# Patient Record
Sex: MALE | NOT HISPANIC OR LATINO | Employment: FULL TIME | URBAN - METROPOLITAN AREA
[De-identification: names, ages, dates, MRNs, and addresses within clinical notes are randomized per-mention and may not be internally consistent; named-entity substitution may affect disease eponyms.]

---

## 2020-11-17 ENCOUNTER — RESULTS ENCOUNTER (OUTPATIENT)
Dept: INTERNAL MEDICINE | Facility: CLINIC | Age: 60
End: 2020-11-17

## 2020-11-17 ENCOUNTER — LAB (OUTPATIENT)
Dept: LAB | Facility: HOSPITAL | Age: 60
End: 2020-11-17

## 2020-11-17 ENCOUNTER — OFFICE VISIT (OUTPATIENT)
Dept: INTERNAL MEDICINE | Facility: CLINIC | Age: 60
End: 2020-11-17

## 2020-11-17 VITALS
RESPIRATION RATE: 16 BRPM | WEIGHT: 223.4 LBS | SYSTOLIC BLOOD PRESSURE: 110 MMHG | TEMPERATURE: 97.3 F | DIASTOLIC BLOOD PRESSURE: 70 MMHG | HEART RATE: 81 BPM | BODY MASS INDEX: 31.27 KG/M2 | OXYGEN SATURATION: 97 % | HEIGHT: 71 IN

## 2020-11-17 DIAGNOSIS — Z20.822 CLOSE EXPOSURE TO COVID-19 VIRUS: ICD-10-CM

## 2020-11-17 DIAGNOSIS — Z12.11 SCREENING FOR COLON CANCER: ICD-10-CM

## 2020-11-17 DIAGNOSIS — F33.1 MODERATE EPISODE OF RECURRENT MAJOR DEPRESSIVE DISORDER (HCC): ICD-10-CM

## 2020-11-17 DIAGNOSIS — Z23 NEED FOR INFLUENZA VACCINATION: ICD-10-CM

## 2020-11-17 DIAGNOSIS — M77.8 RIGHT ELBOW TENDINITIS: ICD-10-CM

## 2020-11-17 DIAGNOSIS — F41.9 ANXIETY: Primary | ICD-10-CM

## 2020-11-17 PROCEDURE — 90471 IMMUNIZATION ADMIN: CPT | Performed by: NURSE PRACTITIONER

## 2020-11-17 PROCEDURE — 90686 IIV4 VACC NO PRSV 0.5 ML IM: CPT | Performed by: NURSE PRACTITIONER

## 2020-11-17 PROCEDURE — 86769 SARS-COV-2 COVID-19 ANTIBODY: CPT | Performed by: NURSE PRACTITIONER

## 2020-11-17 PROCEDURE — 99203 OFFICE O/P NEW LOW 30 MIN: CPT | Performed by: NURSE PRACTITIONER

## 2020-11-17 PROCEDURE — 36415 COLL VENOUS BLD VENIPUNCTURE: CPT

## 2020-11-17 RX ORDER — DEXTROAMPHETAMINE SACCHARATE, AMPHETAMINE ASPARTATE, DEXTROAMPHETAMINE SULFATE AND AMPHETAMINE SULFATE 5; 5; 5; 5 MG/1; MG/1; MG/1; MG/1
20 TABLET ORAL 3 TIMES DAILY
COMMUNITY
End: 2022-01-14 | Stop reason: ALTCHOICE

## 2020-11-17 RX ORDER — DULOXETIN HYDROCHLORIDE 60 MG/1
60 CAPSULE, DELAYED RELEASE ORAL DAILY
COMMUNITY
End: 2020-11-17 | Stop reason: SDUPTHER

## 2020-11-17 RX ORDER — IBUPROFEN 800 MG/1
800 TABLET ORAL EVERY 8 HOURS PRN
Qty: 90 TABLET | Refills: 2 | Status: SHIPPED | OUTPATIENT
Start: 2020-11-17 | End: 2021-03-08

## 2020-11-17 RX ORDER — BUSPIRONE HYDROCHLORIDE 15 MG/1
15 TABLET ORAL 3 TIMES DAILY
Qty: 180 TABLET | Refills: 1 | Status: SHIPPED | OUTPATIENT
Start: 2020-11-17 | End: 2020-12-21 | Stop reason: SDUPTHER

## 2020-11-17 RX ORDER — DULOXETIN HYDROCHLORIDE 60 MG/1
60 CAPSULE, DELAYED RELEASE ORAL DAILY
Qty: 90 CAPSULE | Refills: 1 | Status: SHIPPED | OUTPATIENT
Start: 2020-11-17 | End: 2022-01-14 | Stop reason: SDUPTHER

## 2020-11-17 RX ORDER — BUSPIRONE HYDROCHLORIDE 10 MG/1
10 TABLET ORAL 2 TIMES DAILY
COMMUNITY
End: 2020-11-17

## 2020-11-17 NOTE — PROGRESS NOTES
Subjective   Kennedy Griffith is a 60 y.o. male    Chief Complaint   Patient presents with   • Establish Care   • Right elbow pain   • Depression, Fatigue/Sleepiness     Discuss increase in dose of the Buspar.    • Med Refill     History of Present Illness     New pt here to establish care.  Pt recently moved to [place].      Depression/anxiety - pt lost is daughter to PTSD/Suicide 7-8 yrs ago.  He himself has battled depression and anxiety since.  He is currently on Cymbalta 60 mg daily and Buspar 10 mg BID.  He has been taking his wife's 15 mg BID and feels better on this dose.  He would like to increase to the 15 mg dose.  Has also been given Ativan in the past for PRN use.    He is also prescribed adderall 20 mg TID.  States that he has been on this for 20+ years.  Was placed on this due to chronic/excessive daytime fatigue.  Was originally prescribed Provigil and later changed to this due to insurance purposes    Right elbow pain - pt states that prior to moving to [place], he was removing snow spikes from his tires.  This was a repetitive motion that took him several hours.  Since then, he has had pain in his right elbow that radiates to the right hand/wrist.  Pain is worse with gripping/turning and lifting.  Has full ROM.  Has been wearing a brace with mild relief of sx's.  Has also tried OTC tylenol and aleve w/o relief of sx's.      Patient states that around the end of August, 1 September he was ill.  States that he had flulike symptoms.  He was sent home from work and quarantine for 14 days.  He did have a Covid test which was negative at the time.  States that he had all of the symptoms other than loss of sense of taste or smell.  He would like to have the antibody test done today.    Past Medical History:   Diagnosis Date   • Depression    • Fatigue     chronic   • History of kidney stones    • Sleepiness    • Tension headache      History reviewed. No pertinent surgical history.    Family History   Problem Relation  Age of Onset   • Arthritis Father    • Other Mother         cardiac arrhythmia   • No Known Problems Sister    • No Known Problems Brother    • Other Daughter         PTSD/suicide   • No Known Problems Son    • ADD / ADHD Son      Social History     Socioeconomic History   • Marital status:      Spouse name: Not on file   • Number of children: Not on file   • Years of education: Not on file   • Highest education level: Not on file   Tobacco Use   • Smoking status: Never Smoker   • Smokeless tobacco: Never Used   Substance and Sexual Activity   • Alcohol use: Yes     Comment: on rare occasion   • Drug use: Never     No Known Allergies      The following portions of the patient's history were reviewed and updated as appropriate: allergies, current medications, past family history, past medical history, past social history, past surgical history and problem list.    Current Outpatient Medications:   •  amphetamine-dextroamphetamine (ADDERALL) 20 MG tablet, Take 20 mg by mouth 3 (Three) Times a Day., Disp: , Rfl:   •  DULoxetine (CYMBALTA) 60 MG capsule, Take 1 capsule by mouth Daily., Disp: 90 capsule, Rfl: 1  •  busPIRone (BUSPAR) 15 MG tablet, Take 1 tablet by mouth 3 (Three) Times a Day., Disp: 180 tablet, Rfl: 1  •  ibuprofen (ADVIL,MOTRIN) 800 MG tablet, Take 1 tablet by mouth Every 8 (Eight) Hours As Needed for Mild Pain ., Disp: 90 tablet, Rfl: 2     Review of Systems   Constitutional: Positive for fatigue. Negative for chills and fever.   Respiratory: Negative for cough, chest tightness and shortness of breath.    Cardiovascular: Negative for chest pain.   Gastrointestinal: Negative for abdominal pain, diarrhea, nausea and vomiting.   Endocrine: Negative for cold intolerance and heat intolerance.   Musculoskeletal: Positive for arthralgias.        Right elbow pain   Neurological: Negative for dizziness.   Psychiatric/Behavioral: The patient is nervous/anxious.        Objective   Physical  "Exam  Constitutional:       Appearance: He is well-developed.   HENT:      Head: Normocephalic and atraumatic.   Eyes:      Conjunctiva/sclera: Conjunctivae normal.      Pupils: Pupils are equal, round, and reactive to light.   Neck:      Musculoskeletal: Normal range of motion.   Cardiovascular:      Rate and Rhythm: Normal rate and regular rhythm.      Heart sounds: Normal heart sounds.   Pulmonary:      Effort: Pulmonary effort is normal.      Breath sounds: Normal breath sounds.   Abdominal:      General: Bowel sounds are normal.      Palpations: Abdomen is soft.   Musculoskeletal: Normal range of motion.      Right elbow: He exhibits normal range of motion, no swelling, no effusion, no deformity and no laceration. Tenderness found. Medial epicondyle and lateral epicondyle tenderness noted. No radial head and no olecranon process tenderness noted.      Comments:  strength on the right 4/5, left 5/5; noted pain with gripping and rotation   Skin:     General: Skin is warm and dry.   Neurological:      Mental Status: He is alert and oriented to person, place, and time.      Deep Tendon Reflexes: Reflexes are normal and symmetric.   Psychiatric:         Behavior: Behavior normal.         Thought Content: Thought content normal.         Judgment: Judgment normal.       Vitals:    11/17/20 1036   BP: 110/70   Pulse: 81   Resp: 16   Temp: 97.3 °F (36.3 °C)   TempSrc: Infrared   SpO2: 97%   Weight: 101 kg (223 lb 6.4 oz)   Height: 179.1 cm (70.5\")         Assessment/Plan   Diagnoses and all orders for this visit:    1. Anxiety (Primary)  -     DULoxetine (CYMBALTA) 60 MG capsule; Take 1 capsule by mouth Daily.  Dispense: 90 capsule; Refill: 1  -     busPIRone (BUSPAR) 15 MG tablet; Take 1 tablet by mouth 3 (Three) Times a Day.  Dispense: 180 tablet; Refill: 1    2. Moderate episode of recurrent major depressive disorder (CMS/HCC)  -     DULoxetine (CYMBALTA) 60 MG capsule; Take 1 capsule by mouth Daily.  Dispense: " 90 capsule; Refill: 1  -     busPIRone (BUSPAR) 15 MG tablet; Take 1 tablet by mouth 3 (Three) Times a Day.  Dispense: 180 tablet; Refill: 1    3. Screening for colon cancer  -     Cologuard - Stool, Per Rectum; Future    4. Need for influenza vaccination  -     FluLaval Quad >6 Months ([id number])    5. Close exposure to COVID-19 virus  -     SARS-CoV-2 Antibodies (Roche); Future    6. Right elbow tendinitis  -     ibuprofen (ADVIL,MOTRIN) 800 MG tablet; Take 1 tablet by mouth Every 8 (Eight) Hours As Needed for Mild Pain .  Dispense: 90 tablet; Refill: 2      Cymbalta refilled  Buspar increased to 15 mg BID  Ibuprofen 800 mg 1 p.o. 3 times daily as needed for right elbow tendinitis, patient to let me know if symptoms or not improving and we will try physical therapy, I have recommended a elbow Aircast.  Lynsey ordered  Flu shot updated  We will check Covid antibodies  Return in about 6 weeks (around 12/29/2020) for Annual.

## 2020-11-18 LAB — SARS-COV-2 AB SERPL QL IA: NEGATIVE

## 2020-12-21 DIAGNOSIS — F41.9 ANXIETY: ICD-10-CM

## 2020-12-21 DIAGNOSIS — F33.1 MODERATE EPISODE OF RECURRENT MAJOR DEPRESSIVE DISORDER (HCC): ICD-10-CM

## 2020-12-21 RX ORDER — BUSPIRONE HYDROCHLORIDE 15 MG/1
15 TABLET ORAL 3 TIMES DAILY
Qty: 90 TABLET | Refills: 0 | Status: SHIPPED | OUTPATIENT
Start: 2020-12-21 | End: 2021-01-19 | Stop reason: SDUPTHER

## 2021-01-15 ENCOUNTER — OFFICE VISIT (OUTPATIENT)
Dept: INTERNAL MEDICINE | Facility: CLINIC | Age: 61
End: 2021-01-15

## 2021-01-15 VITALS
SYSTOLIC BLOOD PRESSURE: 112 MMHG | TEMPERATURE: 97.2 F | OXYGEN SATURATION: 97 % | RESPIRATION RATE: 16 BRPM | HEIGHT: 71 IN | DIASTOLIC BLOOD PRESSURE: 68 MMHG | HEART RATE: 84 BPM | BODY MASS INDEX: 31.61 KG/M2 | WEIGHT: 225.8 LBS

## 2021-01-15 DIAGNOSIS — Z12.5 SCREENING FOR PROSTATE CANCER: ICD-10-CM

## 2021-01-15 DIAGNOSIS — F43.10 PTSD (POST-TRAUMATIC STRESS DISORDER): ICD-10-CM

## 2021-01-15 DIAGNOSIS — Z00.00 ROUTINE GENERAL MEDICAL EXAMINATION AT A HEALTH CARE FACILITY: Primary | ICD-10-CM

## 2021-01-15 DIAGNOSIS — R51.9 FREQUENT HEADACHES: ICD-10-CM

## 2021-01-15 DIAGNOSIS — F41.9 ANXIETY: ICD-10-CM

## 2021-01-15 DIAGNOSIS — Z11.59 ENCOUNTER FOR HEPATITIS C SCREENING TEST FOR LOW RISK PATIENT: ICD-10-CM

## 2021-01-15 PROBLEM — G43.909 MIGRAINE WITHOUT STATUS MIGRAINOSUS, NOT INTRACTABLE: Status: RESOLVED | Noted: 2021-01-15 | Resolved: 2021-01-15

## 2021-01-15 PROBLEM — G43.909 MIGRAINE WITHOUT STATUS MIGRAINOSUS, NOT INTRACTABLE: Status: ACTIVE | Noted: 2021-01-15

## 2021-01-15 PROCEDURE — 99396 PREV VISIT EST AGE 40-64: CPT | Performed by: NURSE PRACTITIONER

## 2021-01-15 RX ORDER — HYDROXYZINE PAMOATE 25 MG/1
CAPSULE ORAL
COMMUNITY
Start: 2020-12-16 | End: 2022-03-25

## 2021-01-15 RX ORDER — TOPIRAMATE 25 MG/1
25 TABLET ORAL 2 TIMES DAILY
Qty: 60 TABLET | Refills: 2 | Status: SHIPPED | OUTPATIENT
Start: 2021-01-15 | End: 2021-04-12

## 2021-01-15 NOTE — PROGRESS NOTES
Subjective   Kennedy Griffith is a 60 y.o. male    Chief Complaint   Patient presents with   • Annual Exam     History of Present Illness     Depression/anxiety - pt lost is daughter to PTSD/Suicide 7-8 yrs ago.  He himself has battled depression and anxiety since.  He was on Cymbalta 60 mg daily and Buspar 10 mg BID at his initial visit and I increased his Buspar to 15 mg TID based on c/o increased anxiety.   Has also been given Ativan in the past for PRN use.  He is now seeing Psych APRN at [facility].       He is also prescribed adderall 20 mg TID.  States that he has been on this for 20+ years.  Was placed on this due to chronic/excessive daytime fatigue.  Was originally prescribed Provigil and later changed to this due to insurance purposes - now followed by Psych.     Right elbow pain - pt states that prior to moving to [place], he was removing snow spikes from his tires.  This was a repetitive motion that took him several hours.  Since then, he has had pain in his right elbow that radiates to the right hand/wrist.  Pain is worse with gripping/turning and lifting.  Has full ROM.  Has been wearing a brace with mild relief of sx's. Has also tried OTC tylenol and aleve w/o relief of sx's.  Was given 800 mg of Ibuprofen at last visit and states it has greatly improved.    Tdap - will hold off today b/c he will be getting the COVID vaccine  Flu shot - 11/2020  Shingrix - will ck a the pharmacy  Cologuard was ordered at last visit, but could not be processed.  He will contact to get a new kit.    Hep A - never, holding off until he gets the COVID vaccine  Hep C screen - unknown  PSA - updating today    Diet - could use work    Exercise - minimal    Social History     Tobacco Use   • Smoking status: Never Smoker   • Smokeless tobacco: Never Used   Substance Use Topics   • Alcohol use: Yes     Comment: on rare occasion   • Drug use: Never         The following portions of the patient's history were reviewed and updated as  appropriate: allergies, current medications, past family history, past medical history, past social history, past surgical history and problem list.    Current Outpatient Medications:   •  busPIRone (BUSPAR) 15 MG tablet, Take 1 tablet by mouth 3 (Three) Times a Day., Disp: 90 tablet, Rfl: 0  •  DULoxetine (CYMBALTA) 60 MG capsule, Take 1 capsule by mouth Daily., Disp: 90 capsule, Rfl: 1  •  hydrOXYzine pamoate (VISTARIL) 25 MG capsule, TAKE 1 CAPSULE BY MOUTH 3 TIMES A DAY AS NEEDED FOR PANIC, Disp: , Rfl:   •  ibuprofen (ADVIL,MOTRIN) 800 MG tablet, Take 1 tablet by mouth Every 8 (Eight) Hours As Needed for Mild Pain ., Disp: 90 tablet, Rfl: 2  •  amphetamine-dextroamphetamine (ADDERALL) 20 MG tablet, Take 20 mg by mouth 3 (Three) Times a Day., Disp: , Rfl:   •  topiramate (Topamax) 25 MG tablet, Take 1 tablet by mouth 2 (Two) Times a Day., Disp: 60 tablet, Rfl: 2     Review of Systems   Constitutional: Negative for appetite change, chills, fatigue, fever and unexpected weight change.   HENT: Negative for congestion, ear pain, nosebleeds, rhinorrhea and tinnitus.    Eyes: Negative for pain.   Respiratory: Negative for cough, chest tightness and shortness of breath.    Cardiovascular: Negative for chest pain, palpitations and leg swelling.   Gastrointestinal: Negative for abdominal distention, abdominal pain, blood in stool, constipation, diarrhea, nausea and vomiting.   Endocrine: Negative for cold intolerance, heat intolerance, polydipsia, polyphagia and polyuria.   Genitourinary: Negative for dysuria, flank pain, frequency, hematuria and urgency.   Musculoskeletal: Negative for arthralgias, back pain, gait problem, joint swelling, myalgias and neck pain.   Skin: Negative for color change, pallor, rash and wound.   Allergic/Immunologic: Negative for environmental allergies and food allergies.   Neurological: Negative for dizziness, syncope, weakness, light-headedness, numbness and headaches.   Hematological:  Negative for adenopathy. Does not bruise/bleed easily.   Psychiatric/Behavioral: Negative for behavioral problems and suicidal ideas. The patient is not nervous/anxious.        Objective   Physical Exam  Constitutional:       Appearance: He is well-developed and normal weight.   HENT:      Head: Normocephalic and atraumatic.      Right Ear: External ear normal.      Left Ear: External ear normal.      Nose: Nose normal.      Mouth/Throat:      Mouth: Mucous membranes are moist.      Pharynx: Oropharynx is clear.   Eyes:      General: Lids are normal.      Conjunctiva/sclera: Conjunctivae normal.      Pupils: Pupils are equal, round, and reactive to light.   Neck:      Musculoskeletal: Normal range of motion and neck supple.      Vascular: No carotid bruit.   Cardiovascular:      Rate and Rhythm: Normal rate and regular rhythm.      Pulses: Normal pulses.      Heart sounds: Normal heart sounds. No murmur.   Pulmonary:      Effort: Pulmonary effort is normal.      Breath sounds: Normal breath sounds.   Abdominal:      General: Bowel sounds are normal.      Palpations: Abdomen is soft. There is no hepatomegaly or splenomegaly.      Hernia: No hernia is present.   Genitourinary:     Penis: Normal.       Prostate: Normal.      Rectum: Normal. Guaiac result negative.   Musculoskeletal: Normal range of motion.   Lymphadenopathy:      Cervical: No cervical adenopathy.   Skin:     General: Skin is warm and dry.      Capillary Refill: Capillary refill takes less than 2 seconds.   Neurological:      Mental Status: He is alert and oriented to person, place, and time.      Deep Tendon Reflexes: Reflexes are normal and symmetric.   Psychiatric:         Mood and Affect: Mood normal.         Behavior: Behavior normal.         Thought Content: Thought content normal.         Judgment: Judgment normal.       Vitals:    01/15/21 0956   BP: 112/68   Pulse: 84   Resp: 16   Temp: 97.2 °F (36.2 °C)   TempSrc: Infrared   SpO2: 97%  "  Weight: 102 kg (225 lb 12.8 oz)   Height: 179.1 cm (70.51\")         Assessment/Plan   Diagnoses and all orders for this visit:    1. Routine general medical examination at a health care facility (Primary)  -     CBC & Differential; Future  -     Comprehensive Metabolic Panel; Future  -     Lipid Panel; Future  -     TSH; Future  -     Vitamin B12; Future  -     Vitamin D 25 Hydroxy; Future    2. Anxiety  -     CBC & Differential; Future  -     Comprehensive Metabolic Panel; Future  -     Lipid Panel; Future  -     TSH; Future  -     Vitamin B12; Future  -     Vitamin D 25 Hydroxy; Future    3. PTSD (post-traumatic stress disorder)  -     CBC & Differential; Future  -     Comprehensive Metabolic Panel; Future  -     Lipid Panel; Future  -     TSH; Future  -     Vitamin B12; Future  -     Vitamin D 25 Hydroxy; Future    4. Frequent headaches  -     topiramate (Topamax) 25 MG tablet; Take 1 tablet by mouth 2 (Two) Times a Day.  Dispense: 60 tablet; Refill: 2    5. Screening for prostate cancer  -     PSA Screen; Future    6. Encounter for hepatitis C screening test for low risk patient  -     Hepatitis C Antibody; Future      Pt is not fasting, so he will RTC for labs  We will try Topamax for frequent HA's  Prostate exam janette  No refills needed  Keep close f/u with Psych as scheduled  Counseling - diet and exercise  Return in about 6 weeks (around 2/26/2021).             "

## 2021-01-19 DIAGNOSIS — F33.1 MODERATE EPISODE OF RECURRENT MAJOR DEPRESSIVE DISORDER (HCC): ICD-10-CM

## 2021-01-19 DIAGNOSIS — F41.9 ANXIETY: ICD-10-CM

## 2021-01-19 RX ORDER — BUSPIRONE HYDROCHLORIDE 15 MG/1
15 TABLET ORAL 3 TIMES DAILY
Qty: 270 TABLET | Refills: 1 | Status: SHIPPED | OUTPATIENT
Start: 2021-01-19

## 2021-03-05 DIAGNOSIS — M77.8 RIGHT ELBOW TENDINITIS: ICD-10-CM

## 2021-03-08 RX ORDER — IBUPROFEN 800 MG/1
800 TABLET ORAL EVERY 8 HOURS PRN
Qty: 90 TABLET | Refills: 2 | Status: SHIPPED | OUTPATIENT
Start: 2021-03-08

## 2021-04-11 DIAGNOSIS — R51.9 FREQUENT HEADACHES: ICD-10-CM

## 2021-04-12 RX ORDER — TOPIRAMATE 25 MG/1
TABLET ORAL
Qty: 180 TABLET | Refills: 1 | Status: SHIPPED | OUTPATIENT
Start: 2021-04-12 | End: 2022-01-14

## 2021-05-13 DIAGNOSIS — F33.1 MODERATE EPISODE OF RECURRENT MAJOR DEPRESSIVE DISORDER (HCC): ICD-10-CM

## 2021-05-13 DIAGNOSIS — F41.9 ANXIETY: ICD-10-CM

## 2021-05-13 RX ORDER — DULOXETIN HYDROCHLORIDE 60 MG/1
CAPSULE, DELAYED RELEASE ORAL
Qty: 90 CAPSULE | Refills: 1 | OUTPATIENT
Start: 2021-05-13

## 2021-05-25 DIAGNOSIS — F33.1 MODERATE EPISODE OF RECURRENT MAJOR DEPRESSIVE DISORDER (HCC): ICD-10-CM

## 2021-05-25 DIAGNOSIS — F41.9 ANXIETY: ICD-10-CM

## 2021-05-26 RX ORDER — BUSPIRONE HYDROCHLORIDE 15 MG/1
TABLET ORAL
Qty: 270 TABLET | Refills: 1 | OUTPATIENT
Start: 2021-05-26

## 2021-08-27 ENCOUNTER — TELEMEDICINE (OUTPATIENT)
Dept: INTERNAL MEDICINE | Facility: CLINIC | Age: 61
End: 2021-08-27

## 2021-08-27 ENCOUNTER — LAB (OUTPATIENT)
Dept: LAB | Facility: HOSPITAL | Age: 61
End: 2021-08-27

## 2021-08-27 VITALS — BODY MASS INDEX: 29.96 KG/M2 | HEIGHT: 71 IN | TEMPERATURE: 97.5 F | RESPIRATION RATE: 20 BRPM | WEIGHT: 214 LBS

## 2021-08-27 DIAGNOSIS — R05.9 COUGH: ICD-10-CM

## 2021-08-27 DIAGNOSIS — R30.9 PAINFUL URINATION: ICD-10-CM

## 2021-08-27 DIAGNOSIS — R30.9 PAINFUL URINATION: Primary | ICD-10-CM

## 2021-08-27 DIAGNOSIS — R68.83 CHILLS: ICD-10-CM

## 2021-08-27 DIAGNOSIS — N39.0 URINARY TRACT INFECTION WITHOUT HEMATURIA, SITE UNSPECIFIED: ICD-10-CM

## 2021-08-27 DIAGNOSIS — R52 BODY ACHES: ICD-10-CM

## 2021-08-27 LAB
BILIRUB BLD-MCNC: NEGATIVE MG/DL
CLARITY, POC: ABNORMAL
COLOR UR: ABNORMAL
EXPIRATION DATE: NORMAL
FLUAV AG NPH QL: NEGATIVE
FLUBV AG NPH QL: NEGATIVE
GLUCOSE UR STRIP-MCNC: NEGATIVE MG/DL
INTERNAL CONTROL: NORMAL
KETONES UR QL: NEGATIVE
LEUKOCYTE EST, POC: ABNORMAL
Lab: 2780
NITRITE UR-MCNC: POSITIVE MG/ML
PH UR: 6 [PH] (ref 5–8)
PROT UR STRIP-MCNC: ABNORMAL MG/DL
RBC # UR STRIP: ABNORMAL /UL
SARS-COV-2 RNA NOSE QL NAA+PROBE: NOT DETECTED
SP GR UR: 1.02 (ref 1–1.03)
UROBILINOGEN UR QL: NORMAL

## 2021-08-27 PROCEDURE — 87086 URINE CULTURE/COLONY COUNT: CPT | Performed by: NURSE PRACTITIONER

## 2021-08-27 PROCEDURE — 81003 URINALYSIS AUTO W/O SCOPE: CPT | Performed by: NURSE PRACTITIONER

## 2021-08-27 PROCEDURE — U0004 COV-19 TEST NON-CDC HGH THRU: HCPCS | Performed by: NURSE PRACTITIONER

## 2021-08-27 PROCEDURE — 87077 CULTURE AEROBIC IDENTIFY: CPT | Performed by: NURSE PRACTITIONER

## 2021-08-27 PROCEDURE — 87186 SC STD MICRODIL/AGAR DIL: CPT | Performed by: NURSE PRACTITIONER

## 2021-08-27 PROCEDURE — 99214 OFFICE O/P EST MOD 30 MIN: CPT | Performed by: NURSE PRACTITIONER

## 2021-08-27 PROCEDURE — 87804 INFLUENZA ASSAY W/OPTIC: CPT | Performed by: NURSE PRACTITIONER

## 2021-08-27 RX ORDER — CIPROFLOXACIN 500 MG/1
500 TABLET, FILM COATED ORAL 2 TIMES DAILY
Qty: 14 TABLET | Refills: 0 | Status: SHIPPED | OUTPATIENT
Start: 2021-08-27 | End: 2021-09-03

## 2021-08-27 NOTE — PROGRESS NOTES
Subjective   Kennedy Griffith is a 60 y.o. male    Chief Complaint   Patient presents with   • Blood in Urine   • Difficulty Urinating     History of Present Illness     This was an audio and video enabled telemedicine encounter.    You have chosen to receive care through a telehealth visit.  Do you consent to use a video/audio connection for your medical care today? Yes    Pt presents with 2 day h/o body aches and a terrible HA.  + Dry/mild cough with slight SOA.  States that he feels like he has the flu.  No fever, + chills.  No known exposure to COVID.  He is fully vaccinated.        CURRENT COVID RISKS:  [] Fever [x] Cough [] Shortness of breath [] Loss of taste or smell    [] Exposure to COVID positive patient  [] High risk facility   []  NONE     Pt also c/o burning and painful urination with dark colored urine.  He does have a h/o kidney stones, but sx's are not similar.  No LBP.  No fever, but has felt chilled as mentioned above.      The following portions of the patient's history were reviewed and updated as appropriate: allergies, current medications, past family history, past medical history, past social history, past surgical history and problem list.    Current Outpatient Medications:   •  amphetamine-dextroamphetamine (ADDERALL) 20 MG tablet, Take 20 mg by mouth 3 (Three) Times a Day., Disp: , Rfl:   •  busPIRone (BUSPAR) 15 MG tablet, Take 1 tablet by mouth 3 (Three) Times a Day., Disp: 270 tablet, Rfl: 1  •  DULoxetine (CYMBALTA) 60 MG capsule, Take 1 capsule by mouth Daily., Disp: 90 capsule, Rfl: 1  •  hydrOXYzine pamoate (VISTARIL) 25 MG capsule, TAKE 1 CAPSULE BY MOUTH 3 TIMES A DAY AS NEEDED FOR PANIC, Disp: , Rfl:   •  ibuprofen (ADVIL,MOTRIN) 800 MG tablet, TAKE 1 TABLET BY MOUTH EVERY 8 (EIGHT) HOURS AS NEEDED FOR MILD PAIN ., Disp: 90 tablet, Rfl: 2  •  topiramate (TOPAMAX) 25 MG tablet, TAKE 1 TABLET BY MOUTH TWICE A DAY, Disp: 180 tablet, Rfl: 1  •  ciprofloxacin (Cipro) 500 MG tablet, Take 1  "tablet by mouth 2 (Two) Times a Day for 7 days., Disp: 14 tablet, Rfl: 0     Review of Systems   Constitutional: Positive for chills. Negative for fatigue and fever.   HENT: Negative for congestion, postnasal drip, rhinorrhea, sinus pressure and sinus pain.    Respiratory: Positive for cough, chest tightness and shortness of breath.    Cardiovascular: Negative for chest pain.   Gastrointestinal: Negative for abdominal pain, diarrhea, nausea and vomiting.   Endocrine: Negative for cold intolerance and heat intolerance.   Genitourinary: Positive for dysuria, frequency and urgency. Negative for decreased urine volume, difficulty urinating, discharge, enuresis, flank pain, genital sores, hematuria, penile pain, penile swelling, scrotal swelling and testicular pain.   Musculoskeletal: Negative for arthralgias and back pain.   Neurological: Negative for dizziness.       Objective   Physical Exam  Constitutional:       Appearance: Normal appearance.   HENT:      Head: Normocephalic.      Nose: Nose normal.      Mouth/Throat:      Mouth: Mucous membranes are moist.   Eyes:      Pupils: Pupils are equal, round, and reactive to light.   Pulmonary:      Effort: Pulmonary effort is normal.   Musculoskeletal:         General: Normal range of motion.      Cervical back: Normal range of motion.   Neurological:      General: No focal deficit present.      Mental Status: He is alert and oriented to person, place, and time.   Psychiatric:         Mood and Affect: Mood normal.         Behavior: Behavior normal.       Vitals:    08/27/21 1116   Resp: 20   Temp: 97.5 °F (36.4 °C)   Weight: 97.1 kg (214 lb)   Height: 179.1 cm (70.5\")         Assessment/Plan   Diagnoses and all orders for this visit:    1. Painful urination (Primary)  -     Cancel: POC Glycosylated Hemoglobin (Hb A1C)  -     ciprofloxacin (Cipro) 500 MG tablet; Take 1 tablet by mouth 2 (Two) Times a Day for 7 days.  Dispense: 14 tablet; Refill: 0  -     Urine Culture - , " Urine, Clean Catch; Future  -     POCT urinalysis dipstick, automated    2. Urinary tract infection without hematuria, site unspecified  -     ciprofloxacin (Cipro) 500 MG tablet; Take 1 tablet by mouth 2 (Two) Times a Day for 7 days.  Dispense: 14 tablet; Refill: 0  -     Urine Culture - , Urine, Clean Catch; Future  -     POCT urinalysis dipstick, automated    3. Body aches  -     COVID-19 PCR, [facility], NP SWAB IN [facility] V[employer]AL TRANSPORT MEDIA/ORAL SWISH 24-30 HR TAT - Swab, Nasopharynx; Future  -     POC Influenza A / B    4. Cough  -     COVID-19 PCR, [facility], NP SWAB IN [facility] [employer] TRANSPORT MEDIA/ORAL SWISH 24-30 HR TAT - Swab, Nasopharynx; Future  -     POC Influenza A / B    5. Chills  -     COVID-19 PCR, [facility], NP SWAB IN [facility] [employer] TRANSPORT MEDIA/ORAL SWISH 24-30 HR TAT - Swab, Nasopharynx; Future  -     POC Influenza A / B      Will ck COVID  Rapid flu neg  UA +, sent for culture  Covered with Cipro  Increase fluids  To the ER with any worsening sx's

## 2021-08-29 LAB — BACTERIA SPEC AEROBE CULT: ABNORMAL

## 2021-08-30 ENCOUNTER — TELEPHONE (OUTPATIENT)
Dept: INTERNAL MEDICINE | Facility: CLINIC | Age: 61
End: 2021-08-30

## 2021-08-30 NOTE — TELEPHONE ENCOUNTER
----- Message from JAYLEN Panda sent at 8/30/2021  5:21 AM EDT -----  COVID test was negative!    Urine culture was +, but the antibiotic that I put him on should be covering.  How is he feeling?

## 2022-01-14 ENCOUNTER — LAB (OUTPATIENT)
Dept: LAB | Facility: HOSPITAL | Age: 62
End: 2022-01-14

## 2022-01-14 ENCOUNTER — OFFICE VISIT (OUTPATIENT)
Dept: INTERNAL MEDICINE | Facility: CLINIC | Age: 62
End: 2022-01-14

## 2022-01-14 VITALS
TEMPERATURE: 97.3 F | HEIGHT: 71 IN | HEART RATE: 72 BPM | DIASTOLIC BLOOD PRESSURE: 66 MMHG | BODY MASS INDEX: 31 KG/M2 | WEIGHT: 221.4 LBS | SYSTOLIC BLOOD PRESSURE: 114 MMHG | OXYGEN SATURATION: 97 %

## 2022-01-14 DIAGNOSIS — Z12.11 SCREENING FOR COLON CANCER: ICD-10-CM

## 2022-01-14 DIAGNOSIS — F41.9 ANXIETY: ICD-10-CM

## 2022-01-14 DIAGNOSIS — E55.9 VITAMIN D DEFICIENCY: ICD-10-CM

## 2022-01-14 DIAGNOSIS — R06.09 DYSPNEA ON EXERTION: ICD-10-CM

## 2022-01-14 DIAGNOSIS — F33.1 MODERATE EPISODE OF RECURRENT MAJOR DEPRESSIVE DISORDER: ICD-10-CM

## 2022-01-14 DIAGNOSIS — K21.9 GASTROESOPHAGEAL REFLUX DISEASE, UNSPECIFIED WHETHER ESOPHAGITIS PRESENT: ICD-10-CM

## 2022-01-14 DIAGNOSIS — R07.9 CHEST PAIN, UNSPECIFIED TYPE: Primary | ICD-10-CM

## 2022-01-14 DIAGNOSIS — F43.10 PTSD (POST-TRAUMATIC STRESS DISORDER): ICD-10-CM

## 2022-01-14 DIAGNOSIS — R07.9 CHEST PAIN, UNSPECIFIED TYPE: ICD-10-CM

## 2022-01-14 LAB
BASOPHILS # BLD AUTO: 0.05 10*3/MM3 (ref 0–0.2)
BASOPHILS NFR BLD AUTO: 0.9 % (ref 0–1.5)
DEPRECATED RDW RBC AUTO: 40.2 FL (ref 37–54)
EOSINOPHIL # BLD AUTO: 0.27 10*3/MM3 (ref 0–0.4)
EOSINOPHIL NFR BLD AUTO: 4.7 % (ref 0.3–6.2)
ERYTHROCYTE [DISTWIDTH] IN BLOOD BY AUTOMATED COUNT: 13 % (ref 12.3–15.4)
HCT VFR BLD AUTO: 47 % (ref 37.5–51)
HGB BLD-MCNC: 15.5 G/DL (ref 13–17.7)
IMM GRANULOCYTES # BLD AUTO: 0.01 10*3/MM3 (ref 0–0.05)
IMM GRANULOCYTES NFR BLD AUTO: 0.2 % (ref 0–0.5)
LYMPHOCYTES # BLD AUTO: 2.29 10*3/MM3 (ref 0.7–3.1)
LYMPHOCYTES NFR BLD AUTO: 39.6 % (ref 19.6–45.3)
MCH RBC QN AUTO: 28.5 PG (ref 26.6–33)
MCHC RBC AUTO-ENTMCNC: 33 G/DL (ref 31.5–35.7)
MCV RBC AUTO: 86.6 FL (ref 79–97)
MONOCYTES # BLD AUTO: 0.4 10*3/MM3 (ref 0.1–0.9)
MONOCYTES NFR BLD AUTO: 6.9 % (ref 5–12)
NEUTROPHILS NFR BLD AUTO: 2.77 10*3/MM3 (ref 1.7–7)
NEUTROPHILS NFR BLD AUTO: 47.7 % (ref 42.7–76)
NRBC BLD AUTO-RTO: 0 /100 WBC (ref 0–0.2)
PLATELET # BLD AUTO: 290 10*3/MM3 (ref 140–450)
PMV BLD AUTO: 9.6 FL (ref 6–12)
RBC # BLD AUTO: 5.43 10*6/MM3 (ref 4.14–5.8)
TROPONIN T SERPL-MCNC: <0.01 NG/ML (ref 0–0.03)
WBC NRBC COR # BLD: 5.79 10*3/MM3 (ref 3.4–10.8)

## 2022-01-14 PROCEDURE — 84484 ASSAY OF TROPONIN QUANT: CPT | Performed by: NURSE PRACTITIONER

## 2022-01-14 PROCEDURE — 80053 COMPREHEN METABOLIC PANEL: CPT | Performed by: NURSE PRACTITIONER

## 2022-01-14 PROCEDURE — 80061 LIPID PANEL: CPT | Performed by: NURSE PRACTITIONER

## 2022-01-14 PROCEDURE — 99214 OFFICE O/P EST MOD 30 MIN: CPT | Performed by: NURSE PRACTITIONER

## 2022-01-14 PROCEDURE — 85025 COMPLETE CBC W/AUTO DIFF WBC: CPT | Performed by: NURSE PRACTITIONER

## 2022-01-14 PROCEDURE — 84443 ASSAY THYROID STIM HORMONE: CPT | Performed by: NURSE PRACTITIONER

## 2022-01-14 PROCEDURE — 82607 VITAMIN B-12: CPT | Performed by: NURSE PRACTITIONER

## 2022-01-14 PROCEDURE — 82306 VITAMIN D 25 HYDROXY: CPT | Performed by: NURSE PRACTITIONER

## 2022-01-14 RX ORDER — DEXTROAMPHETAMINE SACCHARATE, AMPHETAMINE ASPARTATE, DEXTROAMPHETAMINE SULFATE AND AMPHETAMINE SULFATE 5; 5; 5; 5 MG/1; MG/1; MG/1; MG/1
20 TABLET ORAL DAILY
COMMUNITY

## 2022-01-14 RX ORDER — BUSPIRONE HYDROCHLORIDE 15 MG/1
15 TABLET ORAL 3 TIMES DAILY
Qty: 270 TABLET | Refills: 1 | Status: CANCELLED | OUTPATIENT
Start: 2022-01-14

## 2022-01-14 RX ORDER — DULOXETIN HYDROCHLORIDE 60 MG/1
60 CAPSULE, DELAYED RELEASE ORAL DAILY
Qty: 90 CAPSULE | Refills: 1 | Status: SHIPPED | OUTPATIENT
Start: 2022-01-14 | End: 2022-09-09

## 2022-01-14 RX ORDER — DEXTROAMPHETAMINE SULFATE, DEXTROAMPHETAMINE SACCHARATE, AMPHETAMINE SULFATE AND AMPHETAMINE ASPARTATE 7.5; 7.5; 7.5; 7.5 MG/1; MG/1; MG/1; MG/1
30 CAPSULE, EXTENDED RELEASE ORAL DAILY
COMMUNITY
Start: 2021-11-30

## 2022-01-14 RX ORDER — DEXTROAMPHETAMINE/AMPHETAMINE 10 MG
10 CAPSULE, EXT RELEASE 24 HR ORAL DAILY
COMMUNITY
Start: 2021-11-30 | End: 2022-04-22 | Stop reason: ALTCHOICE

## 2022-01-14 NOTE — PROGRESS NOTES
Subjective   Kennedy Griffith is a 61 y.o. male    Chief Complaint   Patient presents with   • Anxiety     follow up   • Headache     f/u   • Med Refill   • Chest Pain     symptoms for a long time, like he runs a marathon he gets winded and has chest pain, happens after exertion   • Heartburn     couple of months ago     History of Present Illness     Here for f/u on chronic conditions    CP - pt states that he has been CP intermittently on exertion.  Sx's have been waxing and waning since around 2017.  He did have a full cardiac work up at that time (stress/echo), and was told that it was normal.  Feels that sx's are worsening.  He cannot walk up one flight of stairs w/o SOA and chest pressure.  CP is mid-sternal.  Pain does not radiate.  No other associated sx's.  +CAD in PGF.      Depression/anxiety - pt lost is daughter to PTSD/Suicide 7-8 yrs ago.  He himself has battled depression and anxiety since.  He was on Cymbalta 60 mg daily and Buspar 10 mg BID at his initial visit and I increased his Buspar to 15 mg TID based on c/o increased anxiety.   Has also been given Ativan in the past for PRN use.  He is now seeing Psych APRN at [facility].       He is also prescribed adderall 20 mg TID.  States that he has been on this for 20+ years.  Was placed on this due to chronic/excessive daytime fatigue.  Was originally prescribed Provigil and later changed to Adderall due to insurance purposes - now followed by Psych.    HA's/tension - tried Topamax and it did not help    GERD - c/o heartburn that has been occurring frequently over the past few months.  He is taking OTC pepcid with some relief of sx's.      COVID - 1/29/21, 3/9/2021, 12/3/2021  Tdap - declines for today  Flu shot - 12/3/2021  Shingrix - will ck a the pharmacy  Cologuard was ordered at last visit, but could not be processed.  He needs a new order  Hep A - declines today      The following portions of the patient's history were reviewed and updated as appropriate:  allergies, current medications, past family history, past medical history, past social history, past surgical history and problem list.    Current Outpatient Medications:   •  Adderall XR 10 MG 24 hr capsule, 10 mg Daily, Disp: , Rfl:   •  Adderall XR 30 MG 24 hr capsule, 30 mg Daily, Disp: , Rfl:   •  amphetamine-dextroamphetamine (ADDERALL) 20 MG tablet, Take 20 mg by mouth Daily. In the afternoon, Disp: , Rfl:   •  busPIRone (BUSPAR) 15 MG tablet, Take 1 tablet by mouth 3 (Three) Times a Day., Disp: 270 tablet, Rfl: 1  •  DULoxetine (CYMBALTA) 60 MG capsule, Take 1 capsule by mouth Daily., Disp: 90 capsule, Rfl: 1  •  hydrOXYzine pamoate (VISTARIL) 25 MG capsule, TAKE 1 CAPSULE BY MOUTH 3 TIMES A DAY AS NEEDED FOR PANIC, Disp: , Rfl:   •  ibuprofen (ADVIL,MOTRIN) 800 MG tablet, TAKE 1 TABLET BY MOUTH EVERY 8 (EIGHT) HOURS AS NEEDED FOR MILD PAIN ., Disp: 90 tablet, Rfl: 2     Review of Systems   Constitutional: Negative for chills, fatigue and fever.   Respiratory: Positive for shortness of breath. Negative for cough and chest tightness.    Cardiovascular: Positive for chest pain.   Gastrointestinal: Negative for abdominal pain, diarrhea, nausea and vomiting.   Endocrine: Negative for cold intolerance and heat intolerance.   Musculoskeletal: Negative for arthralgias.   Neurological: Negative for dizziness.       Objective   Physical Exam  Constitutional:       Appearance: He is well-developed.   HENT:      Head: Normocephalic and atraumatic.   Eyes:      Conjunctiva/sclera: Conjunctivae normal.      Pupils: Pupils are equal, round, and reactive to light.   Cardiovascular:      Rate and Rhythm: Normal rate and regular rhythm.      Heart sounds: Normal heart sounds.   Pulmonary:      Effort: Pulmonary effort is normal.      Breath sounds: Normal breath sounds.   Abdominal:      General: Bowel sounds are normal.      Palpations: Abdomen is soft.   Musculoskeletal:         General: Normal range of motion.       "Cervical back: Normal range of motion.   Skin:     General: Skin is warm and dry.   Neurological:      Mental Status: He is alert and oriented to person, place, and time.      Deep Tendon Reflexes: Reflexes are normal and symmetric.   Psychiatric:         Behavior: Behavior normal.         Thought Content: Thought content normal.         Judgment: Judgment normal.       Vitals:    01/14/22 0935   BP: 114/66   BP Location: Left arm   Patient Position: Sitting   Pulse: 72   Temp: 97.3 °F (36.3 °C)   TempSrc: Infrared   SpO2: 97%   Weight: 100 kg (221 lb 6.4 oz)   Height: 179.1 cm (70.5\")       ECG 12 Lead    Date/Time: 1/18/2022 12:59 PM  Performed by: Carline Schmitt APRN  Authorized by: Carline Schmitt APRN   Comparison: not compared with previous ECG   Rhythm: sinus rhythm  Rate: normal  Conduction: conduction normal  ST Segments: ST segments normal  T Waves: T waves normal  QRS axis: normal  Other findings: non-specific ST-T wave changes    Clinical impression: normal ECG              Assessment/Plan   Diagnoses and all orders for this visit:    1. Chest pain, unspecified type (Primary)  -     ECG 12 Lead  -     CBC & Differential; Future  -     Comprehensive Metabolic Panel; Future  -     Lipid Panel; Future  -     TSH; Future  -     Vitamin B12; Future  -     Vitamin D 25 Hydroxy; Future  -     Troponin; Future  -     Stress test with myocardial perfusion; Future  -     Adult Transthoracic Echo Complete W/ Cont if Necessary Per Protocol; Future    2. Dyspnea on exertion  -     Stress test with myocardial perfusion; Future  -     Adult Transthoracic Echo Complete W/ Cont if Necessary Per Protocol; Future    3. Anxiety  -     DULoxetine (CYMBALTA) 60 MG capsule; Take 1 capsule by mouth Daily.  Dispense: 90 capsule; Refill: 1  -     ECG 12 Lead  -     CBC & Differential; Future  -     Comprehensive Metabolic Panel; Future  -     Lipid Panel; Future  -     TSH; Future  -     Vitamin B12; Future  -     " Vitamin D 25 Hydroxy; Future    4. Moderate episode of recurrent major depressive disorder (HCC)  -     DULoxetine (CYMBALTA) 60 MG capsule; Take 1 capsule by mouth Daily.  Dispense: 90 capsule; Refill: 1  -     ECG 12 Lead  -     CBC & Differential; Future  -     Comprehensive Metabolic Panel; Future  -     Lipid Panel; Future  -     TSH; Future  -     Vitamin B12; Future  -     Vitamin D 25 Hydroxy; Future    5. PTSD (post-traumatic stress disorder)  -     DULoxetine (CYMBALTA) 60 MG capsule; Take 1 capsule by mouth Daily.  Dispense: 90 capsule; Refill: 1  -     ECG 12 Lead  -     CBC & Differential; Future  -     Comprehensive Metabolic Panel; Future  -     Lipid Panel; Future  -     TSH; Future  -     Vitamin B12; Future  -     Vitamin D 25 Hydroxy; Future    6. Vitamin D deficiency  -     DULoxetine (CYMBALTA) 60 MG capsule; Take 1 capsule by mouth Daily.  Dispense: 90 capsule; Refill: 1  -     ECG 12 Lead  -     CBC & Differential; Future  -     Comprehensive Metabolic Panel; Future  -     Lipid Panel; Future  -     TSH; Future  -     Vitamin B12; Future  -     Vitamin D 25 Hydroxy; Future    7. Gastroesophageal reflux disease, unspecified whether esophagitis present    8. Screening for colon cancer  -     Cologuard - Stool, Per Rectum; Future      EKG with NSST changes  Will set up for stress test and echo  Labs sent  Cologuard ordered  Meds refilled  Counseling - diet and exercise  Return in about 6 weeks (around 2/25/2022) for f/u, collect labs today.

## 2022-01-15 LAB
25(OH)D3 SERPL-MCNC: 14.6 NG/ML
ALBUMIN SERPL-MCNC: 4.4 G/DL (ref 3.5–5.2)
ALBUMIN/GLOB SERPL: 1.5 G/DL
ALP SERPL-CCNC: 82 U/L (ref 39–117)
ALT SERPL W P-5'-P-CCNC: 46 U/L (ref 1–41)
ANION GAP SERPL CALCULATED.3IONS-SCNC: 12.4 MMOL/L (ref 5–15)
AST SERPL-CCNC: 26 U/L (ref 1–40)
BILIRUB SERPL-MCNC: 0.5 MG/DL (ref 0–1.2)
BUN SERPL-MCNC: 15 MG/DL (ref 8–23)
BUN/CREAT SERPL: 14.2 (ref 7–25)
CALCIUM SPEC-SCNC: 9.3 MG/DL (ref 8.6–10.5)
CHLORIDE SERPL-SCNC: 105 MMOL/L (ref 98–107)
CHOLEST SERPL-MCNC: 236 MG/DL (ref 0–200)
CO2 SERPL-SCNC: 27.6 MMOL/L (ref 22–29)
CREAT SERPL-MCNC: 1.06 MG/DL (ref 0.76–1.27)
GFR SERPL CREATININE-BSD FRML MDRD: 71 ML/MIN/1.73
GFR SERPL CREATININE-BSD FRML MDRD: 86 ML/MIN/1.73
GLOBULIN UR ELPH-MCNC: 2.9 GM/DL
GLUCOSE SERPL-MCNC: 94 MG/DL (ref 65–99)
HDLC SERPL-MCNC: 45 MG/DL (ref 40–60)
LDLC SERPL CALC-MCNC: 167 MG/DL (ref 0–100)
LDLC/HDLC SERPL: 3.66 {RATIO}
POTASSIUM SERPL-SCNC: 4.3 MMOL/L (ref 3.5–5.2)
PROT SERPL-MCNC: 7.3 G/DL (ref 6–8.5)
SODIUM SERPL-SCNC: 145 MMOL/L (ref 136–145)
TRIGL SERPL-MCNC: 132 MG/DL (ref 0–150)
TSH SERPL DL<=0.05 MIU/L-ACNC: 1.27 UIU/ML (ref 0.27–4.2)
VIT B12 BLD-MCNC: 386 PG/ML (ref 211–946)
VLDLC SERPL-MCNC: 24 MG/DL (ref 5–40)

## 2022-01-18 PROCEDURE — 93000 ELECTROCARDIOGRAM COMPLETE: CPT | Performed by: NURSE PRACTITIONER

## 2022-01-18 RX ORDER — CHOLECALCIFEROL (VITAMIN D3) 1250 MCG
50000 CAPSULE ORAL
Qty: 8 CAPSULE | Refills: 0 | Status: SHIPPED | OUTPATIENT
Start: 2022-01-18 | End: 2022-03-09

## 2022-01-18 RX ORDER — ATORVASTATIN CALCIUM 10 MG/1
10 TABLET, FILM COATED ORAL DAILY
Qty: 90 TABLET | Refills: 1 | Status: SHIPPED | OUTPATIENT
Start: 2022-01-18 | End: 2022-04-22

## 2022-02-23 RX ORDER — CHOLECALCIFEROL (VITAMIN D3) 1250 MCG
50000 CAPSULE ORAL
Qty: 4 CAPSULE | Refills: 1 | OUTPATIENT
Start: 2022-02-23 | End: 2022-04-14

## 2022-02-23 NOTE — TELEPHONE ENCOUNTER
Pt wife informed medication is denied because pt will need to start taking OTC Vit D3 5000 iu per last message after labs were drawn.

## 2022-03-04 ENCOUNTER — OFFICE VISIT (OUTPATIENT)
Dept: INTERNAL MEDICINE | Facility: CLINIC | Age: 62
End: 2022-03-04

## 2022-03-04 ENCOUNTER — LAB (OUTPATIENT)
Dept: LAB | Facility: HOSPITAL | Age: 62
End: 2022-03-04

## 2022-03-04 VITALS
WEIGHT: 227 LBS | DIASTOLIC BLOOD PRESSURE: 70 MMHG | OXYGEN SATURATION: 94 % | BODY MASS INDEX: 31.78 KG/M2 | TEMPERATURE: 97.3 F | HEART RATE: 89 BPM | SYSTOLIC BLOOD PRESSURE: 110 MMHG | RESPIRATION RATE: 18 BRPM | HEIGHT: 71 IN

## 2022-03-04 DIAGNOSIS — E55.9 VITAMIN D DEFICIENCY: ICD-10-CM

## 2022-03-04 DIAGNOSIS — R07.9 CHEST PAIN, UNSPECIFIED TYPE: Primary | ICD-10-CM

## 2022-03-04 DIAGNOSIS — E78.2 MIXED HYPERLIPIDEMIA: ICD-10-CM

## 2022-03-04 LAB
ALBUMIN SERPL-MCNC: 4.5 G/DL (ref 3.5–5.2)
ALBUMIN/GLOB SERPL: 1.9 G/DL
ALP SERPL-CCNC: 79 U/L (ref 39–117)
ALT SERPL W P-5'-P-CCNC: 50 U/L (ref 1–41)
ANION GAP SERPL CALCULATED.3IONS-SCNC: 6.9 MMOL/L (ref 5–15)
AST SERPL-CCNC: 19 U/L (ref 1–40)
BILIRUB SERPL-MCNC: 0.5 MG/DL (ref 0–1.2)
BUN SERPL-MCNC: 17 MG/DL (ref 8–23)
BUN/CREAT SERPL: 19.3 (ref 7–25)
CALCIUM SPEC-SCNC: 9.6 MG/DL (ref 8.6–10.5)
CHLORIDE SERPL-SCNC: 104 MMOL/L (ref 98–107)
CO2 SERPL-SCNC: 30.1 MMOL/L (ref 22–29)
CREAT SERPL-MCNC: 0.88 MG/DL (ref 0.76–1.27)
EGFRCR SERPLBLD CKD-EPI 2021: 96.6 ML/MIN/1.73
GLOBULIN UR ELPH-MCNC: 2.4 GM/DL
GLUCOSE SERPL-MCNC: 122 MG/DL (ref 65–99)
POTASSIUM SERPL-SCNC: 4.1 MMOL/L (ref 3.5–5.2)
PROT SERPL-MCNC: 6.9 G/DL (ref 6–8.5)
SODIUM SERPL-SCNC: 141 MMOL/L (ref 136–145)

## 2022-03-04 PROCEDURE — 99214 OFFICE O/P EST MOD 30 MIN: CPT | Performed by: NURSE PRACTITIONER

## 2022-03-04 PROCEDURE — 80053 COMPREHEN METABOLIC PANEL: CPT | Performed by: NURSE PRACTITIONER

## 2022-03-04 NOTE — PROGRESS NOTES
Subjective   Kennedy Griffith is a 61 y.o. male    Chief Complaint   Patient presents with   • Anxiety     states that his anxiety and dep have been getting better   • Depression   • Follow-up     chest pain, echo and stress test set up for 3/25/22.      History of Present Illness     CP - At last visit, pt reported that he had been experiencing CP intermittently on exertion.  Sx's had been waxing and waning since around 2017.  He did have a full cardiac work up at that time (stress/echo), and was told that it was normal.  Feels that sx's are worsening.  He cannot walk up one flight of stairs w/o SOA and chest pressure.  CP is mid-sternal.  Pain does not radiate.  No other associated sx's.  +CAD in PGF.  EKG was done, NSR.  Labs sent - within acceptable limits.  He is scheduled for a stress and echo on 3/25/2022.  Still having intermittent sx's.      HL - was started on Lipitor 10 mg after last labs  Lab Results   Component Value Date    CHOL 236 (H) 01/14/2022    TRIG 132 01/14/2022    HDL 45 01/14/2022     (H) 01/14/2022     Vit D def - currently taking Rx strength D3 due to deficiency.     The following portions of the patient's history were reviewed and updated as appropriate: allergies, current medications, past family history, past medical history, past social history, past surgical history and problem list.    Current Outpatient Medications:   •  Adderall XR 10 MG 24 hr capsule, 10 mg Daily, Disp: , Rfl:   •  Adderall XR 30 MG 24 hr capsule, 30 mg Daily, Disp: , Rfl:   •  amphetamine-dextroamphetamine (ADDERALL) 20 MG tablet, Take 20 mg by mouth Daily. In the afternoon, Disp: , Rfl:   •  atorvastatin (Lipitor) 10 MG tablet, Take 1 tablet by mouth Daily., Disp: 90 tablet, Rfl: 1  •  busPIRone (BUSPAR) 15 MG tablet, Take 1 tablet by mouth 3 (Three) Times a Day., Disp: 270 tablet, Rfl: 1  •  Cholecalciferol (Vitamin D3) 1.25 MG (65306 UT) capsule, Take 1 capsule by mouth Every 7 (Seven) Days for 8 doses.,  Disp: 8 capsule, Rfl: 0  •  DULoxetine (CYMBALTA) 60 MG capsule, Take 1 capsule by mouth Daily., Disp: 90 capsule, Rfl: 1  •  hydrOXYzine pamoate (VISTARIL) 25 MG capsule, TAKE 1 CAPSULE BY MOUTH 3 TIMES A DAY AS NEEDED FOR PANIC, Disp: , Rfl:   •  ibuprofen (ADVIL,MOTRIN) 800 MG tablet, TAKE 1 TABLET BY MOUTH EVERY 8 (EIGHT) HOURS AS NEEDED FOR MILD PAIN ., Disp: 90 tablet, Rfl: 2     Review of Systems   Constitutional: Negative for chills, fatigue and fever.   Respiratory: Negative for cough, chest tightness and shortness of breath.    Cardiovascular: Negative for chest pain.   Gastrointestinal: Negative for abdominal pain, diarrhea, nausea and vomiting.   Endocrine: Negative for cold intolerance and heat intolerance.   Musculoskeletal: Negative for arthralgias.   Neurological: Negative for dizziness.       Objective   Physical Exam  Constitutional:       Appearance: He is well-developed.   HENT:      Head: Normocephalic and atraumatic.   Eyes:      Conjunctiva/sclera: Conjunctivae normal.      Pupils: Pupils are equal, round, and reactive to light.   Cardiovascular:      Rate and Rhythm: Normal rate and regular rhythm.      Heart sounds: Normal heart sounds.   Pulmonary:      Effort: Pulmonary effort is normal.      Breath sounds: Normal breath sounds.   Abdominal:      General: Bowel sounds are normal.      Palpations: Abdomen is soft.   Musculoskeletal:         General: Normal range of motion.      Cervical back: Normal range of motion.   Skin:     General: Skin is warm and dry.   Neurological:      Mental Status: He is alert and oriented to person, place, and time.      Deep Tendon Reflexes: Reflexes are normal and symmetric.   Psychiatric:         Behavior: Behavior normal.         Thought Content: Thought content normal.         Judgment: Judgment normal.       Vitals:    03/04/22 1315   BP: 110/70   Cuff Size: Large Adult   Pulse: 89   Resp: 18   Temp: 97.3 °F (36.3 °C)   TempSrc: Infrared   SpO2: 94%  "  Weight: 103 kg (227 lb)   Height: 179.1 cm (70.5\")         Assessment/Plan   Diagnoses and all orders for this visit:    1. Chest pain, unspecified type (Primary)    2. Mixed hyperlipidemia  -     Comprehensive Metabolic Panel; Future    3. Vitamin D deficiency      Keep appts for stress and echo as scheduled.  We will check CMP  Finished vitamin D as directed  Return in about 6 weeks (around 4/15/2022) for Annual, collect labs today.               "

## 2022-03-07 ENCOUNTER — TELEPHONE (OUTPATIENT)
Dept: INTERNAL MEDICINE | Facility: CLINIC | Age: 62
End: 2022-03-07

## 2022-03-07 PROBLEM — E55.9 VITAMIN D DEFICIENCY: Status: ACTIVE | Noted: 2022-03-07

## 2022-03-07 NOTE — TELEPHONE ENCOUNTER
"Spoke with Radha and insurance denied his STRESS test not STREP.     REFERRAL     \"PER INSURANCE THE REQUEST HAS BEEN DENIED. An inability to walk on a treadmill as much as needed to reach the target heart rate during an exercise treadmill test or ETT (an ECG and blood pressure check done before, during, and after walking on a treadmill)\"  "

## 2022-03-07 NOTE — TELEPHONE ENCOUNTER
[facility] called on behalf of patient to report a denial for a Strep test for patient     Please advise     [phone]

## 2022-03-10 ENCOUNTER — TELEPHONE (OUTPATIENT)
Dept: INTERNAL MEDICINE | Facility: CLINIC | Age: 62
End: 2022-03-10

## 2022-03-17 ENCOUNTER — TELEPHONE (OUTPATIENT)
Dept: INTERNAL MEDICINE | Facility: CLINIC | Age: 62
End: 2022-03-17

## 2022-03-17 NOTE — TELEPHONE ENCOUNTER
Regarding: FW: result    ----- Message -----  From: Pretty Collier MA  Sent: 3/14/2022   9:22 AM EDT  To: Azar Blanco  Clinical Pool  Subject: result                                           ----- Message from Pretty Collier MA sent at 3/14/2022  9:22 AM EDT -----       ----- Message sent from Carline Schmitt APRN to Kennedy Griffith at 3/11/2022  9:46 AM -----   Your cologuard results are negative/normal

## 2022-03-22 NOTE — TELEPHONE ENCOUNTER
[employer] called on behalf of patient stated the Stress test ordered for patient has been denied and a peer to peer would need to be arranged. Patient has procedure scheduled for 03/25

## 2022-03-24 DIAGNOSIS — R07.9 CHEST PAIN, UNSPECIFIED TYPE: Primary | ICD-10-CM

## 2022-03-24 NOTE — TELEPHONE ENCOUNTER
Please let pt know that his insurance co denied the stress test, but I have referred him to the chest pain clinic for an urgent visit.

## 2022-03-25 ENCOUNTER — HOSPITAL ENCOUNTER (OUTPATIENT)
Dept: CARDIOLOGY | Facility: HOSPITAL | Age: 62
Discharge: HOME | End: 2022-03-25

## 2022-03-25 ENCOUNTER — OFFICE VISIT (OUTPATIENT)
Dept: CARDIOLOGY | Facility: HOSPITAL | Age: 62
End: 2022-03-25

## 2022-03-25 ENCOUNTER — HOSPITAL ENCOUNTER (OUTPATIENT)
Dept: CARDIOLOGY | Facility: HOSPITAL | Age: 62
End: 2022-03-25

## 2022-03-25 VITALS
SYSTOLIC BLOOD PRESSURE: 129 MMHG | RESPIRATION RATE: 18 BRPM | HEART RATE: 84 BPM | TEMPERATURE: 97 F | OXYGEN SATURATION: 97 % | DIASTOLIC BLOOD PRESSURE: 76 MMHG | HEIGHT: 71 IN | BODY MASS INDEX: 31.41 KG/M2 | WEIGHT: 224.38 LBS

## 2022-03-25 DIAGNOSIS — R07.9 CHEST PAIN, UNSPECIFIED TYPE: ICD-10-CM

## 2022-03-25 DIAGNOSIS — R06.09 DYSPNEA ON EXERTION: ICD-10-CM

## 2022-03-25 DIAGNOSIS — I20.8 EXERTIONAL ANGINA: Primary | ICD-10-CM

## 2022-03-25 DIAGNOSIS — E78.5 HYPERLIPIDEMIA, UNSPECIFIED HYPERLIPIDEMIA TYPE: ICD-10-CM

## 2022-03-25 DIAGNOSIS — R94.31 ABNORMAL EKG: ICD-10-CM

## 2022-03-25 LAB
BH CV ECHO MEAS - AO MAX PG (FULL): 1.5 MMHG
BH CV ECHO MEAS - AO MAX PG: 9.2 MMHG
BH CV ECHO MEAS - AO MEAN PG (FULL): 0.57 MMHG
BH CV ECHO MEAS - AO MEAN PG: 4.4 MMHG
BH CV ECHO MEAS - AO ROOT AREA (BSA CORRECTED): 1.4
BH CV ECHO MEAS - AO ROOT AREA: 7.5 CM^2
BH CV ECHO MEAS - AO ROOT DIAM: 3.1 CM
BH CV ECHO MEAS - AO V2 MAX: 151.5 CM/SEC
BH CV ECHO MEAS - AO V2 MEAN: 95 CM/SEC
BH CV ECHO MEAS - AO V2 VTI: 29.8 CM
BH CV ECHO MEAS - ASC AORTA: 2.9 CM
BH CV ECHO MEAS - AVA(I,A): 3 CM^2
BH CV ECHO MEAS - AVA(I,D): 3 CM^2
BH CV ECHO MEAS - AVA(V,A): 2.8 CM^2
BH CV ECHO MEAS - AVA(V,D): 2.8 CM^2
BH CV ECHO MEAS - BSA(HAYCOCK): 2.3 M^2
BH CV ECHO MEAS - BSA: 2.2 M^2
BH CV ECHO MEAS - BZI_BMI: 32.1 KILOGRAMS/M^2
BH CV ECHO MEAS - BZI_METRIC_HEIGHT: 177.8 CM
BH CV ECHO MEAS - BZI_METRIC_WEIGHT: 101.6 KG
BH CV ECHO MEAS - EDV(CUBED): 96.1 ML
BH CV ECHO MEAS - EDV(MOD-SP2): 110 ML
BH CV ECHO MEAS - EDV(MOD-SP4): 100 ML
BH CV ECHO MEAS - EDV(TEICH): 96.4 ML
BH CV ECHO MEAS - EF(CUBED): 88.6 %
BH CV ECHO MEAS - EF(MOD-BP): 63 %
BH CV ECHO MEAS - EF(MOD-SP2): 66.4 %
BH CV ECHO MEAS - EF(MOD-SP4): 60 %
BH CV ECHO MEAS - EF(TEICH): 82.7 %
BH CV ECHO MEAS - ESV(CUBED): 11 ML
BH CV ECHO MEAS - ESV(MOD-SP2): 37 ML
BH CV ECHO MEAS - ESV(MOD-SP4): 40 ML
BH CV ECHO MEAS - ESV(TEICH): 16.6 ML
BH CV ECHO MEAS - FS: 51.5 %
BH CV ECHO MEAS - IVS/LVPW: 0.95
BH CV ECHO MEAS - IVSD: 0.93 CM
BH CV ECHO MEAS - LA DIMENSION: 4.7 CM
BH CV ECHO MEAS - LA/AO: 1.5
BH CV ECHO MEAS - LAD MAJOR: 5.6 CM
BH CV ECHO MEAS - LAT PEAK E' VEL: 10.4 CM/SEC
BH CV ECHO MEAS - LATERAL E/E' RATIO: 6.1
BH CV ECHO MEAS - LV DIASTOLIC VOL/BSA (35-75): 45.7 ML/M^2
BH CV ECHO MEAS - LV IVRT: 0.07 SEC
BH CV ECHO MEAS - LV MASS(C)D: 148.8 GRAMS
BH CV ECHO MEAS - LV MASS(C)DI: 67.9 GRAMS/M^2
BH CV ECHO MEAS - LV MAX PG: 7.6 MMHG
BH CV ECHO MEAS - LV MEAN PG: 3.9 MMHG
BH CV ECHO MEAS - LV SYSTOLIC VOL/BSA (12-30): 18.3 ML/M^2
BH CV ECHO MEAS - LV V1 MAX: 138.2 CM/SEC
BH CV ECHO MEAS - LV V1 MEAN: 92.5 CM/SEC
BH CV ECHO MEAS - LV V1 VTI: 29 CM
BH CV ECHO MEAS - LVIDD: 4.6 CM
BH CV ECHO MEAS - LVIDS: 2.2 CM
BH CV ECHO MEAS - LVLD AP2: 8.2 CM
BH CV ECHO MEAS - LVLD AP4: 7.6 CM
BH CV ECHO MEAS - LVLS AP2: 6.1 CM
BH CV ECHO MEAS - LVLS AP4: 6.7 CM
BH CV ECHO MEAS - LVOT AREA (M): 3.1 CM^2
BH CV ECHO MEAS - LVOT AREA: 3.1 CM^2
BH CV ECHO MEAS - LVOT DIAM: 2 CM
BH CV ECHO MEAS - LVPWD: 0.98 CM
BH CV ECHO MEAS - MED PEAK E' VEL: 7.7 CM/SEC
BH CV ECHO MEAS - MEDIAL E/E' RATIO: 8.2
BH CV ECHO MEAS - MV A MAX VEL: 75.8 CM/SEC
BH CV ECHO MEAS - MV DEC SLOPE: 320.4 CM/SEC^2
BH CV ECHO MEAS - MV DEC TIME: 0.23 SEC
BH CV ECHO MEAS - MV E MAX VEL: 64.4 CM/SEC
BH CV ECHO MEAS - MV E/A: 0.85
BH CV ECHO MEAS - MV MAX PG: 2.9 MMHG
BH CV ECHO MEAS - MV MEAN PG: 1.1 MMHG
BH CV ECHO MEAS - MV P1/2T MAX VEL: 84.6 CM/SEC
BH CV ECHO MEAS - MV P1/2T: 77.3 MSEC
BH CV ECHO MEAS - MV V2 MAX: 85.7 CM/SEC
BH CV ECHO MEAS - MV V2 MEAN: 47.5 CM/SEC
BH CV ECHO MEAS - MV V2 VTI: 22.6 CM
BH CV ECHO MEAS - MVA P1/2T LCG: 2.6 CM^2
BH CV ECHO MEAS - MVA(P1/2T): 2.8 CM^2
BH CV ECHO MEAS - MVA(VTI): 4 CM^2
BH CV ECHO MEAS - PA ACC SLOPE: 360.1 CM/SEC^2
BH CV ECHO MEAS - PA ACC TIME: 0.2 SEC
BH CV ECHO MEAS - PA MAX PG: 5.5 MMHG
BH CV ECHO MEAS - PA PR(ACCEL): -12.9 MMHG
BH CV ECHO MEAS - PA V2 MAX: 117.3 CM/SEC
BH CV ECHO MEAS - SI(AO): 101.6 ML/M^2
BH CV ECHO MEAS - SI(CUBED): 38.9 ML/M^2
BH CV ECHO MEAS - SI(LVOT): 41.3 ML/M^2
BH CV ECHO MEAS - SI(MOD-SP2): 33.3 ML/M^2
BH CV ECHO MEAS - SI(MOD-SP4): 27.4 ML/M^2
BH CV ECHO MEAS - SI(TEICH): 36.4 ML/M^2
BH CV ECHO MEAS - SV(AO): 222.6 ML
BH CV ECHO MEAS - SV(CUBED): 85.1 ML
BH CV ECHO MEAS - SV(LVOT): 90.4 ML
BH CV ECHO MEAS - SV(MOD-SP2): 73 ML
BH CV ECHO MEAS - SV(MOD-SP4): 60 ML
BH CV ECHO MEAS - SV(TEICH): 79.7 ML
BH CV ECHO MEAS - TAPSE (>1.6): 1.8 CM
BH CV ECHO MEAS - TR MAX VEL: 246 CM/SEC
BH CV ECHO MEASUREMENTS AVERAGE E/E' RATIO: 7.12
BH CV VAS BP LEFT ARM: NORMAL MMHG
BH CV XLRA - RV BASE: 3.3 CM
BH CV XLRA - RV LENGTH: 4.9 CM
BH CV XLRA - RV MID: 2.5 CM
BH CV XLRA - TDI S': 13.7 CM/SEC
LEFT ATRIUM VOLUME INDEX: 27.4 ML/M^2
LEFT ATRIUM VOLUME: 60 ML
LV EF 2D ECHO EST: 60 %
QT INTERVAL: 380 MS
QTC INTERVAL: 446 MS

## 2022-03-25 PROCEDURE — 99214 OFFICE O/P EST MOD 30 MIN: CPT | Performed by: NURSE PRACTITIONER

## 2022-03-25 PROCEDURE — 93010 ELECTROCARDIOGRAM REPORT: CPT | Performed by: INTERNAL MEDICINE

## 2022-03-25 PROCEDURE — 93306 TTE W/DOPPLER COMPLETE: CPT | Performed by: INTERNAL MEDICINE

## 2022-03-25 PROCEDURE — 93306 TTE W/DOPPLER COMPLETE: CPT

## 2022-03-25 PROCEDURE — 93005 ELECTROCARDIOGRAM TRACING: CPT | Performed by: NURSE PRACTITIONER

## 2022-03-25 NOTE — PROGRESS NOTES
"[facility]  [facility] Val[facility]    Chief Complaint  Chest Pain and Establish Care    Subjective    History of Present Illness {CC  Problem List  Visit  Diagnosis   Encounters  Notes  Medications  Labs  Result Review Imaging  Media :23}     Kennedy Griffith is a 61 y.o. male with hyperlipidemia, anxiety, PTSD who presents today for evaluation of chest pain at the request of JAYLEN Weller.  Patient reports intermittent chest pain since 2017.  He had a full cardiac work-up around that time including a nuclear stress test and echo and was reportedly normal.  However, recently he feels that symptoms are worsening.  He reports he cannot walk up 1 flight of stairs without shortness of breath and chest pressure.  Chest pressure is midsternal and does not radiate. He does not have chest pain at rest or with eating. Chest pain is not reproducible. He notes associated AMAYA and feels as if he has just \"sprinted\". He works with chemical weapons. He does report occasion shooting pain down his neck, jaw and left arm at rest but not with exertion  He has a echo scheduled for today.  Stress test was ordered but insurance denied it     Cardiac risks: Hyperlipidemia, age, gender, obesity    Objective     Vital Signs:   Vitals:    03/25/22 0753 03/25/22 0755 03/25/22 0756   BP: 135/78 148/79 129/76   BP Location: Right arm Left arm Left arm   Patient Position: Sitting Standing Sitting   Cuff Size: Adult Adult Adult   Pulse: 79 89 84   Resp:   18   Temp: 97 °F (36.1 °C) 97 °F (36.1 °C) 97 °F (36.1 °C)   TempSrc: Temporal Temporal    SpO2: 94% 95% 97%   Weight:   102 kg (224 lb 6 oz)   Height:   179.1 cm (70.5\")     Body mass index is 31.74 kg/m².  Physical Exam  Vitals reviewed.   Constitutional:       Appearance: Normal appearance.   HENT:      Head: Normocephalic.   Neck:      Vascular: No carotid bruit.   Cardiovascular:      Rate and Rhythm: Normal rate and regular rhythm.      Pulses: Normal " pulses.      Heart sounds: Normal heart sounds, S1 normal and S2 normal. No murmur heard.  Pulmonary:      Effort: Pulmonary effort is normal. No respiratory distress.      Breath sounds: Normal breath sounds.   Chest:      Chest wall: No tenderness.   Abdominal:      General: Abdomen is flat.      Palpations: Abdomen is soft.   Musculoskeletal:      Cervical back: Neck supple.      Right lower leg: No edema.      Left lower leg: No edema.   Skin:     General: Skin is warm and dry.   Neurological:      General: No focal deficit present.      Mental Status: He is alert and oriented to person, place, and time. Mental status is at baseline.   Psychiatric:         Mood and Affect: Mood normal.         Behavior: Behavior normal.         Thought Content: Thought content normal.              Result Review  Data Reviewed:{ Labs  Result Review  Imaging  Med Tab  Media :23}   Comprehensive Metabolic Panel (03/04/2022 14:05)  Troponin (01/14/2022 10:14)  Vitamin D 25 Hydroxy (01/14/2022 10:14)  Vitamin B12 (01/14/2022 10:14)  TSH (01/14/2022 10:14)  Lipid Panel (01/14/2022 10:14)  Comprehensive Metabolic Panel (01/14/2022 10:14)  CBC & Differential (01/14/2022 10:14)  EKG today shows normal sinus rhythm, left anterior fascicular block  Consultant notes Carline YORK            Assessment and Plan {CC Problem List  Visit Diagnosis  ROS  Review (Popup)  Health Maintenance  Quality  BestPractice  Medications  SmartSets  SnapShot Encounters  Media :23}   1. Exertional angina (HCC)  Patient symptoms are concerning for angina.  Needs urgent stress test.  Will reorder myocardial perfusion study and do [id number] if needed.  - ECG 12 Lead; Future  - Stress Test With Myocardial Perfusion (1 Day); Future    2. Abnormal EKG    - Stress Test With Myocardial Perfusion (1 Day); Future    3. Hyperlipidemia, unspecified hyperlipidemia type  The 10-year ASCVD risk score (Weemsyovany GALLO Jr., et al., 2013) is: 13.4%    Values  used to calculate the score:      Age: 61 years      Sex: Male      Is Non- : No      Diabetic: No      Tobacco smoker: No      Systolic Blood Pressure: 129 mmHg      Is BP treated: No      HDL Cholesterol: 45 mg/dL      Total Cholesterol: 236 mg/dL    Recently started on statin therapy  - Stress Test With Myocardial Perfusion (1 Day); Future          Follow Up {Instructions Charge Capture  Follow-up Communications :23}   No follow-ups on file.    Patient was given instructions and counseling regarding his condition or for health maintenance advice. Please see specific information pulled into the AVS if appropriate.  Advised to call the Heart and Valve Center with any questions, concerns, or worsening symptoms.

## 2022-04-01 ENCOUNTER — TELEPHONE (OUTPATIENT)
Dept: INTERNAL MEDICINE | Facility: CLINIC | Age: 62
End: 2022-04-01

## 2022-04-08 ENCOUNTER — HOSPITAL ENCOUNTER (OUTPATIENT)
Dept: CARDIOLOGY | Facility: HOSPITAL | Age: 62
End: 2022-04-08

## 2022-04-12 ENCOUNTER — TELEPHONE (OUTPATIENT)
Dept: INTERNAL MEDICINE | Facility: CLINIC | Age: 62
End: 2022-04-12

## 2022-04-12 NOTE — TELEPHONE ENCOUNTER
----- Message from JAYLEN Panda sent at 4/7/2022  1:07 PM EDT -----  Please let patient know that his echo was within normal limits.

## 2022-04-22 ENCOUNTER — OFFICE VISIT (OUTPATIENT)
Dept: INTERNAL MEDICINE | Facility: CLINIC | Age: 62
End: 2022-04-22

## 2022-04-22 VITALS
OXYGEN SATURATION: 98 % | WEIGHT: 218.8 LBS | BODY MASS INDEX: 32.41 KG/M2 | HEIGHT: 69 IN | RESPIRATION RATE: 18 BRPM | TEMPERATURE: 97.3 F | HEART RATE: 87 BPM | SYSTOLIC BLOOD PRESSURE: 126 MMHG | DIASTOLIC BLOOD PRESSURE: 78 MMHG

## 2022-04-22 DIAGNOSIS — Z11.59 ENCOUNTER FOR HEPATITIS C SCREENING TEST FOR LOW RISK PATIENT: ICD-10-CM

## 2022-04-22 DIAGNOSIS — Z12.5 SCREENING FOR PROSTATE CANCER: ICD-10-CM

## 2022-04-22 DIAGNOSIS — F43.10 PTSD (POST-TRAUMATIC STRESS DISORDER): ICD-10-CM

## 2022-04-22 DIAGNOSIS — E78.2 MIXED HYPERLIPIDEMIA: ICD-10-CM

## 2022-04-22 DIAGNOSIS — Z00.00 ANNUAL PHYSICAL EXAM: Primary | ICD-10-CM

## 2022-04-22 DIAGNOSIS — E55.9 VITAMIN D DEFICIENCY: ICD-10-CM

## 2022-04-22 DIAGNOSIS — F41.9 ANXIETY: ICD-10-CM

## 2022-04-22 DIAGNOSIS — R07.9 CHEST PAIN, UNSPECIFIED TYPE: ICD-10-CM

## 2022-04-22 PROCEDURE — 99396 PREV VISIT EST AGE 40-64: CPT | Performed by: NURSE PRACTITIONER

## 2022-04-22 RX ORDER — ROSUVASTATIN CALCIUM 10 MG/1
10 TABLET, COATED ORAL DAILY
Qty: 30 TABLET | Refills: 2 | Status: SHIPPED | OUTPATIENT
Start: 2022-04-22 | End: 2022-07-22

## 2022-07-19 DIAGNOSIS — E78.2 MIXED HYPERLIPIDEMIA: ICD-10-CM

## 2022-07-22 RX ORDER — ROSUVASTATIN CALCIUM 10 MG/1
TABLET, COATED ORAL
Qty: 90 TABLET | Refills: 0 | Status: SHIPPED | OUTPATIENT
Start: 2022-07-22 | End: 2022-10-20

## 2022-07-22 NOTE — TELEPHONE ENCOUNTER
Rx Refill Note  Requested Prescriptions     Pending Prescriptions Disp Refills   • rosuvastatin (CRESTOR) 10 MG tablet [Pharmacy Med Name: ROSUVASTATIN CALCIUM 10 MG TAB] 90 tablet      Sig: TAKE 1 TABLET BY MOUTH EVERY DAY      Last filled: 04/22/2022  Last office visit with prescribing clinician: 4/22/2022      Next office visit with prescribing clinician: 10/21/2022     April TRISTON Dickey MA  07/22/22, 16:15 EDT

## 2022-08-04 ENCOUNTER — HOSPITAL ENCOUNTER (OUTPATIENT)
Dept: CT IMAGING | Facility: HOSPITAL | Age: 62
Discharge: HOME | End: 2022-08-04

## 2022-08-04 ENCOUNTER — TELEMEDICINE (OUTPATIENT)
Dept: INTERNAL MEDICINE | Facility: CLINIC | Age: 62
End: 2022-08-04

## 2022-08-04 ENCOUNTER — LAB (OUTPATIENT)
Dept: LAB | Facility: HOSPITAL | Age: 62
End: 2022-08-04

## 2022-08-04 DIAGNOSIS — R10.9 FLANK PAIN: ICD-10-CM

## 2022-08-04 DIAGNOSIS — R31.0 GROSS HEMATURIA: ICD-10-CM

## 2022-08-04 DIAGNOSIS — R31.0 GROSS HEMATURIA: Primary | ICD-10-CM

## 2022-08-04 LAB
ALBUMIN SERPL-MCNC: 4.4 G/DL (ref 3.5–5.2)
ALBUMIN/GLOB SERPL: 1.6 G/DL
ALP SERPL-CCNC: 80 U/L (ref 39–117)
ALT SERPL W P-5'-P-CCNC: 44 U/L (ref 1–41)
ANION GAP SERPL CALCULATED.3IONS-SCNC: 12 MMOL/L (ref 5–15)
AST SERPL-CCNC: 31 U/L (ref 1–40)
BASOPHILS # BLD AUTO: 0.03 10*3/MM3 (ref 0–0.2)
BASOPHILS NFR BLD AUTO: 0.4 % (ref 0–1.5)
BILIRUB SERPL-MCNC: 0.5 MG/DL (ref 0–1.2)
BUN SERPL-MCNC: 12 MG/DL (ref 8–23)
BUN/CREAT SERPL: 11.7 (ref 7–25)
CALCIUM SPEC-SCNC: 9.3 MG/DL (ref 8.6–10.5)
CHLORIDE SERPL-SCNC: 104 MMOL/L (ref 98–107)
CO2 SERPL-SCNC: 25 MMOL/L (ref 22–29)
CREAT SERPL-MCNC: 1.03 MG/DL (ref 0.76–1.27)
DEPRECATED RDW RBC AUTO: 37.3 FL (ref 37–54)
EGFRCR SERPLBLD CKD-EPI 2021: 81.6 ML/MIN/1.73
EOSINOPHIL # BLD AUTO: 0.16 10*3/MM3 (ref 0–0.4)
EOSINOPHIL NFR BLD AUTO: 2.3 % (ref 0.3–6.2)
ERYTHROCYTE [DISTWIDTH] IN BLOOD BY AUTOMATED COUNT: 12.5 % (ref 12.3–15.4)
GLOBULIN UR ELPH-MCNC: 2.8 GM/DL
GLUCOSE SERPL-MCNC: 120 MG/DL (ref 65–99)
HCT VFR BLD AUTO: 46.3 % (ref 37.5–51)
HGB BLD-MCNC: 16.1 G/DL (ref 13–17.7)
IMM GRANULOCYTES # BLD AUTO: 0.02 10*3/MM3 (ref 0–0.05)
IMM GRANULOCYTES NFR BLD AUTO: 0.3 % (ref 0–0.5)
LYMPHOCYTES # BLD AUTO: 1.92 10*3/MM3 (ref 0.7–3.1)
LYMPHOCYTES NFR BLD AUTO: 27.8 % (ref 19.6–45.3)
MCH RBC QN AUTO: 29 PG (ref 26.6–33)
MCHC RBC AUTO-ENTMCNC: 34.8 G/DL (ref 31.5–35.7)
MCV RBC AUTO: 83.4 FL (ref 79–97)
MONOCYTES # BLD AUTO: 0.56 10*3/MM3 (ref 0.1–0.9)
MONOCYTES NFR BLD AUTO: 8.1 % (ref 5–12)
NEUTROPHILS NFR BLD AUTO: 4.21 10*3/MM3 (ref 1.7–7)
NEUTROPHILS NFR BLD AUTO: 61.1 % (ref 42.7–76)
NRBC BLD AUTO-RTO: 0 /100 WBC (ref 0–0.2)
PLATELET # BLD AUTO: 297 10*3/MM3 (ref 140–450)
PMV BLD AUTO: 9.6 FL (ref 6–12)
POTASSIUM SERPL-SCNC: 3.9 MMOL/L (ref 3.5–5.2)
PROT SERPL-MCNC: 7.2 G/DL (ref 6–8.5)
RBC # BLD AUTO: 5.55 10*6/MM3 (ref 4.14–5.8)
SODIUM SERPL-SCNC: 141 MMOL/L (ref 136–145)
WBC NRBC COR # BLD: 6.9 10*3/MM3 (ref 3.4–10.8)

## 2022-08-04 PROCEDURE — 80053 COMPREHEN METABOLIC PANEL: CPT

## 2022-08-04 PROCEDURE — 85025 COMPLETE CBC W/AUTO DIFF WBC: CPT

## 2022-08-04 PROCEDURE — 99214 OFFICE O/P EST MOD 30 MIN: CPT | Performed by: NURSE PRACTITIONER

## 2022-08-04 PROCEDURE — 87086 URINE CULTURE/COLONY COUNT: CPT

## 2022-08-04 PROCEDURE — 74176 CT ABD & PELVIS W/O CONTRAST: CPT

## 2022-08-04 RX ORDER — DEXTROAMPHETAMINE/AMPHETAMINE 10 MG
CAPSULE, EXT RELEASE 24 HR ORAL
COMMUNITY
Start: 2022-07-18

## 2022-08-04 NOTE — PROGRESS NOTES
Subjective   Kennedy Griffith is a 61 y.o. male    Chief Complaint   Patient presents with   • Blood in Urine     This was an audio and video enabled telemedicine encounter.    You have chosen to receive care through a telehealth visit.  Do you consent to use a video/audio connection for your medical care today? Yes    Blood in Urine  This is a new problem. Episode onset: 2 days. The problem has been waxing and waning since onset. He describes the hematuria as gross hematuria. The hematuria occurs throughout his entire urinary stream. He reports clotting at the middle of his urine stream. His pain is at a severity of 4/10. The pain is mild. He describes his urine color as dark red. Irritative symptoms include nocturia. Obstructive symptoms include incomplete emptying, a slower stream and a weak stream. Obstructive symptoms do not include dribbling or an intermittent stream. Associated symptoms include bladder pain and flank pain (left > right). Pertinent negatives include no abdominal pain, bone pain, chills, dysuria, facial swelling, fever, genital pain, hematospermia, hesitancy, inability to urinate, nausea, urinary retention, vomiting or weight loss. His sexual activity is non-contributory to the current illness. His past medical history is significant for kidney stones. There is no history of BPH,  trauma, hypertension, prostatitis, recent infection, sickle cell disease, STDs or tobacco use. Risk factors: daily ASA.          The following portions of the patient's history were reviewed and updated as appropriate: allergies, current medications, past family history, past medical history, past social history, past surgical history and problem list.    Current Outpatient Medications:   •  rosuvastatin (CRESTOR) 10 MG tablet, TAKE 1 TABLET BY MOUTH EVERY DAY, Disp: 90 tablet, Rfl: 0  •  Adderall XR 10 MG 24 hr capsule, TAKE 1 ORAL CAPSULE EVERY MORNING WITH XR 30 MG, Disp: , Rfl:   •  Adderall XR 30 MG 24 hr capsule,  Take 30 mg by mouth Daily, Disp: , Rfl:   •  amphetamine-dextroamphetamine (ADDERALL) 20 MG tablet, Take 20 mg by mouth Daily. In the afternoon, Disp: , Rfl:   •  busPIRone (BUSPAR) 15 MG tablet, Take 1 tablet by mouth 3 (Three) Times a Day., Disp: 270 tablet, Rfl: 1  •  DULoxetine (CYMBALTA) 60 MG capsule, Take 1 capsule by mouth Daily., Disp: 90 capsule, Rfl: 1  •  ibuprofen (ADVIL,MOTRIN) 800 MG tablet, TAKE 1 TABLET BY MOUTH EVERY 8 (EIGHT) HOURS AS NEEDED FOR MILD PAIN ., Disp: 90 tablet, Rfl: 2     Review of Systems   Constitutional: Negative for chills, fatigue, fever and weight loss.   HENT: Negative for facial swelling.    Respiratory: Negative for cough, chest tightness and shortness of breath.    Cardiovascular: Negative for chest pain.   Gastrointestinal: Negative for abdominal pain, diarrhea, nausea and vomiting.   Endocrine: Negative for cold intolerance and heat intolerance.   Genitourinary: Positive for flank pain (left > right), hematuria, incomplete emptying and nocturia. Negative for dysuria and hesitancy.   Musculoskeletal: Negative for arthralgias.   Neurological: Negative for dizziness.       Objective   Physical Exam  Constitutional:       Appearance: Normal appearance.   HENT:      Head: Normocephalic.      Nose: Nose normal.      Mouth/Throat:      Mouth: Mucous membranes are moist.      Pharynx: Oropharynx is clear.   Eyes:      Pupils: Pupils are equal, round, and reactive to light.   Pulmonary:      Effort: Pulmonary effort is normal.   Musculoskeletal:         General: Normal range of motion.      Cervical back: Normal range of motion.   Neurological:      Mental Status: He is alert and oriented to person, place, and time.   Psychiatric:         Behavior: Behavior normal.       There were no vitals filed for this visit.      Assessment & Plan   Diagnoses and all orders for this visit:    1. Gross hematuria (Primary)  -     CBC & Differential; Future  -     Comprehensive Metabolic Panel;  Future  -     POC Urinalysis Dipstick, Automated; Future  -     Urine Culture - , Urine, Clean Catch; Future  -     CT Abdomen Pelvis Stone Protocol; Future    2. Flank pain  -     CBC & Differential; Future  -     Comprehensive Metabolic Panel; Future  -     POC Urinalysis Dipstick, Automated; Future  -     Urine Culture - , Urine, Clean Catch; Future  -     CT Abdomen Pelvis Stone Protocol; Future      Pt will present to the clinic for UA, culture and labs  STAT CT ordered  Will call with results  To the ER with worsening sx's.

## 2022-08-05 ENCOUNTER — TELEPHONE (OUTPATIENT)
Dept: INTERNAL MEDICINE | Facility: CLINIC | Age: 62
End: 2022-08-05

## 2022-08-05 DIAGNOSIS — R91.8 PULMONARY NODULES: ICD-10-CM

## 2022-08-05 DIAGNOSIS — R31.0 GROSS HEMATURIA: Primary | ICD-10-CM

## 2022-08-05 DIAGNOSIS — R74.8 ELEVATED LIVER ENZYMES: ICD-10-CM

## 2022-08-05 DIAGNOSIS — N20.0 RENAL CALCULI: ICD-10-CM

## 2022-08-05 LAB — BACTERIA SPEC AEROBE CULT: ABNORMAL

## 2022-08-05 NOTE — TELEPHONE ENCOUNTER
[facility] for the patient to return our call, office number was provided      HUB TO READ: Please provide the patient with the below provider comments.     ----- Message from JAYLEN Panda sent at 8/5/2022 11:28 AM EDT -----  Urine culture is + for bacteria.  I am sending in an antibiotic to treat.      Labs show a slightly elevated liver enzyme, I would like to ck additional labs.      CT shows an 8 mm stone on the right without evidence of obstruction at this time.. There are lesions within the left kidney are indeterminate, but likely represent cysts. I am going to refer on to Urology.      Liver shows fatty liver and , but we will ck the additional labs for safe measure      There are also Noncalcified nodules at the lung bases measuring up to 4 mm. I am going to order a CT of the chest for safe measure.

## 2022-08-08 NOTE — TELEPHONE ENCOUNTER
[facility] for the patient to return our call, office number was provided       HUB TO READ: Please provide the patient with the below provider comments.      ----- Message from JAYLEN Panda sent at 8/5/2022 11:28 AM EDT -----  Urine culture is + for bacteria.  I am sending in an antibiotic to treat.       Labs show a slightly elevated liver enzyme, I would like to ck additional labs.       CT shows an 8 mm stone on the right without evidence of obstruction at this time.. There are lesions within the left kidney are indeterminate, but likely represent cysts. I am going to refer on to Urology.       Liver shows fatty liver and , but we will ck the additional labs for safe measure       There are also Noncalcified nodules at the lung bases measuring up to 4 mm. I am going to order a CT of the chest for safe measure.

## 2022-08-24 ENCOUNTER — HOSPITAL ENCOUNTER (OUTPATIENT)
Dept: CT IMAGING | Facility: HOSPITAL | Age: 62
Discharge: HOME | End: 2022-08-24

## 2022-08-24 DIAGNOSIS — R91.8 PULMONARY NODULES: ICD-10-CM

## 2022-09-01 ENCOUNTER — TELEPHONE (OUTPATIENT)
Dept: INTERNAL MEDICINE | Facility: CLINIC | Age: 62
End: 2022-09-01

## 2022-09-01 NOTE — TELEPHONE ENCOUNTER
Caller: Kennedy Griffith    Relationship: Self    Best call back number: [phone]    What orders are you requesting (i.e. lab or imaging): CT SCAN OF CHEST WITH CONTRAST DIAGNOSTIC    In what timeframe would the patient need to come in: ASAP    Where will you receive your lab/imaging services: [facility] ON [facility] [facility]    Additional notes: PATIENT STATES PREVIOUS ORDER FOR W/WO CONTRAST WAS DENIED HOWEVER INSURANCE WILL PAY FOR CT SCAN WITH CONTRAST. PLEASE SEND NEW ORDER AND CALL PATIENT BACK

## 2022-09-07 DIAGNOSIS — R91.8 PULMONARY NODULES: Primary | ICD-10-CM

## 2022-09-09 DIAGNOSIS — F41.9 ANXIETY: ICD-10-CM

## 2022-09-09 DIAGNOSIS — F33.1 MODERATE EPISODE OF RECURRENT MAJOR DEPRESSIVE DISORDER: ICD-10-CM

## 2022-09-09 DIAGNOSIS — E55.9 VITAMIN D DEFICIENCY: ICD-10-CM

## 2022-09-09 DIAGNOSIS — F43.10 PTSD (POST-TRAUMATIC STRESS DISORDER): ICD-10-CM

## 2022-09-09 RX ORDER — DULOXETIN HYDROCHLORIDE 60 MG/1
CAPSULE, DELAYED RELEASE ORAL
Qty: 90 CAPSULE | Refills: 0 | Status: SHIPPED | OUTPATIENT
Start: 2022-09-09

## 2022-09-21 ENCOUNTER — HOSPITAL ENCOUNTER (OUTPATIENT)
Dept: CT IMAGING | Facility: HOSPITAL | Age: 62
Discharge: HOME | End: 2022-09-21
Admitting: NURSE PRACTITIONER

## 2022-09-21 DIAGNOSIS — R91.8 PULMONARY NODULES: ICD-10-CM

## 2022-09-21 LAB — CREAT BLDA-MCNC: 0.9 MG/DL (ref 0.6–1.3)

## 2022-09-21 PROCEDURE — 25010000002 IOPAMIDOL 61 % SOLUTION: Performed by: NURSE PRACTITIONER

## 2022-09-21 PROCEDURE — 82565 ASSAY OF CREATININE: CPT

## 2022-09-21 PROCEDURE — 71260 CT THORAX DX C+: CPT

## 2022-09-21 RX ADMIN — IOPAMIDOL 75 ML: 612 INJECTION, SOLUTION INTRAVENOUS at 15:50

## 2022-09-28 ENCOUNTER — OFFICE VISIT (OUTPATIENT)
Dept: UROLOGY | Facility: CLINIC | Age: 62
End: 2022-09-28

## 2022-09-28 VITALS
OXYGEN SATURATION: 98 % | SYSTOLIC BLOOD PRESSURE: 126 MMHG | HEIGHT: 69 IN | HEART RATE: 76 BPM | WEIGHT: 219 LBS | RESPIRATION RATE: 18 BRPM | DIASTOLIC BLOOD PRESSURE: 84 MMHG | BODY MASS INDEX: 32.44 KG/M2

## 2022-09-28 DIAGNOSIS — R31.0 GROSS HEMATURIA: Primary | ICD-10-CM

## 2022-09-28 PROCEDURE — 99204 OFFICE O/P NEW MOD 45 MIN: CPT | Performed by: UROLOGY

## 2022-10-02 NOTE — PROGRESS NOTES
Gross Hematuria Office Visit      Patient Name: Kennedy Griffith  : 1960   MRN: [id number]     Chief Complaint:  Gross Hematuria.    Referring Provider: Carline Schmitt AP*    History of Present Illness: Mr. Griffith is a 61 y.o. patient with history of gross hematuria.  The patient presents today for evaluation of CT imaging, evaluation after recent episode of gross hematuria.  He was seen, evaluated by primary care physician due to reported episode of left flank pain, CT imaging obtained demonstrating 8 x 10 mm right renal pelvis stone, no significant evidence of hydronephrosis.  No left-sided nephrolithiasis or ureterolithiasis.  He reports episode of gross hematuria around the time of flank pain.  Patient is a never smoker.  He continues to report intermittent flank pain, left greater than right.  He does have a prior history of stone disease.  He denies significant baseline lower urinary tract symptoms.    Prior occupational exposures include:  None    Smoking history: Never    Family history of  malignancy: None           Subjective      Review of System: Review of Systems   Constitutional: Negative for chills, fatigue, fever and unexpected weight change.   HENT: Negative for sore throat.    Eyes: Negative for visual disturbance.   Respiratory: Negative for cough, chest tightness and shortness of breath.    Cardiovascular: Negative for chest pain and leg swelling.   Gastrointestinal: Negative for blood in stool, constipation, diarrhea, nausea, rectal pain and vomiting.   Genitourinary: Negative for decreased urine volume, difficulty urinating, dysuria, enuresis, flank pain, frequency, genital sores, hematuria and urgency.   Musculoskeletal: Negative for back pain and joint swelling.   Skin: Negative for rash and wound.   Neurological: Negative for seizures, speech difficulty, weakness and headaches.   Psychiatric/Behavioral: Negative for confusion, sleep disturbance and suicidal ideas. The  patient is not nervous/anxious.       I have reviewed the ROS documented by my clinical staff,  I have updated appropriately and I agree. Tej Christine MD    Past Medical History:  Past Medical History:   Diagnosis Date   • Depression    • Fatigue     chronic   • History of kidney stones    • Sleepiness    • Tension headache        Past Surgical History:  Past Surgical History:   Procedure Laterality Date   • LIVER BIOPSY      benign   • NO PAST SURGERIES         Medications:    Current Outpatient Medications:   •  Adderall XR 10 MG 24 hr capsule, TAKE 1 ORAL CAPSULE EVERY MORNING WITH XR 30 MG, Disp: , Rfl:   •  Adderall XR 30 MG 24 hr capsule, Take 30 mg by mouth Daily, Disp: , Rfl:   •  amphetamine-dextroamphetamine (ADDERALL) 20 MG tablet, Take 20 mg by mouth Daily. In the afternoon, Disp: , Rfl:   •  busPIRone (BUSPAR) 15 MG tablet, Take 1 tablet by mouth 3 (Three) Times a Day., Disp: 270 tablet, Rfl: 1  •  DULoxetine (CYMBALTA) 60 MG capsule, TAKE 1 CAPSULE BY MOUTH EVERY DAY, Disp: 90 capsule, Rfl: 0  •  ibuprofen (ADVIL,MOTRIN) 800 MG tablet, TAKE 1 TABLET BY MOUTH EVERY 8 (EIGHT) HOURS AS NEEDED FOR MILD PAIN ., Disp: 90 tablet, Rfl: 2  •  rosuvastatin (CRESTOR) 10 MG tablet, TAKE 1 TABLET BY MOUTH EVERY DAY, Disp: 90 tablet, Rfl: 0    Allergies:  No Known Allergies    Social History:  Social History     Socioeconomic History   • Marital status:    Tobacco Use   • Smoking status: Never Smoker   • Smokeless tobacco: Never Used   Substance and Sexual Activity   • Alcohol use: Yes     Comment: on rare occasion   • Drug use: Never       Family History:  Family History   Problem Relation Age of Onset   • Other Mother         cardiac arrhythmia/pacer   • Arthritis Father    • No Known Problems Sister    • No Known Problems Brother    • Colon cancer Maternal Grandmother    • No Known Problems Maternal Grandfather    • Heart disease Paternal Grandmother    • Coronary artery disease Paternal Grandfather    •  "Lung cancer Paternal Grandfather    • Other Daughter         PTSD/suicide   • No Known Problems Son    • ADD / ADHD Son           Objective     Physical Exam:   Vital Signs:   Vitals:    09/28/22 0852   BP: 126/84   Pulse: 76   Resp: 18   SpO2: 98%   Weight: 99.3 kg (219 lb)   Height: 175.3 cm (69.02\")     Body mass index is 32.33 kg/m².     Physical Exam  Vitals and nursing note reviewed.   Constitutional:       Appearance: Normal appearance.   HENT:      Head: Normocephalic and atraumatic.   Cardiovascular:      Comments: Well perfused  Pulmonary:      Effort: Pulmonary effort is normal.   Abdominal:      General: Abdomen is flat.      Palpations: Abdomen is soft.   Musculoskeletal:         General: Normal range of motion.   Skin:     General: Skin is warm and dry.   Neurological:      General: No focal deficit present.      Mental Status: He is alert and oriented to person, place, and time. Mental status is at baseline.   Psychiatric:         Mood and Affect: Mood normal.         Behavior: Behavior normal.         Thought Content: Thought content normal.         Judgment: Judgment normal.         Labs:   Brief Urine Lab Results     None          Urine Culture    Urine Culture 8/4/22   Urine Culture <10,000 CFU/mL Escherichia coli (A)   (A) Abnormal value               Lab Results   Component Value Date    GLUCOSE 120 (H) 08/04/2022    CALCIUM 9.3 08/04/2022     08/04/2022    K 3.9 08/04/2022    CO2 25.0 08/04/2022     08/04/2022    BUN 12 08/04/2022    CREATININE 0.90 09/21/2022    EGFRIFAFRI 86 01/14/2022    EGFRIFNONA 71 01/14/2022    BCR 11.7 08/04/2022    ANIONGAP 12.0 08/04/2022       Lab Results   Component Value Date    WBC 6.90 08/04/2022    HGB 16.1 08/04/2022    HCT 46.3 08/04/2022    MCV 83.4 08/04/2022     08/04/2022       Images:       CT Abdomen Pelvis Stone Protocol    Result Date: 8/4/2022   1. No evidence of renal calculi or obstructive uropathy on the left. 2. 8 mm calculus " within the renal pelvis on the right without evidence of obstruction at this time. 3. Low-attenuation lesions within the left kidney are indeterminate on noncontrast imaging but statistically likely represent cysts. 4. Heterogeneous appearance of the liver with areas of decreased attenuation in geographic sparing favored represent hepatic steatosis. Please correlate with liver function test 6. Noncalcified nodules at the lung bases measuring up to 4 mm. Consider optional follow-up at one year  The Fleischner society pulmonary nodule recommendations are for the follow-up and management of pulmonary nodules smaller than 8 mm detected incidentally in patients >35 years on non-screening CT. The initial guidelines for the management of solid nodules were released in 2005 1, and guidelines for the management of subsolid nodules were released in 2013 2. New revised 2017 recommendations for both solid and subsolid have since been released 4.  2017 guidelines Solid nodules Multiple nodules size: <6 mm *  low risk patients: no routine follow-up *  high risk patients: optional CT at 12 months *  Note: newly detected indeterminate nodule in persons 35 years of age or older. *  low risk patients: minimal or absent history of smoking and or other known risk factors *  high risk patients: history of smoking or of other known risk factors (e.g. first degree relative with lung cancer, or exposure to asbestos, radon, uranium) *  if a nodule up to 8 mm is partly solid or is ground glass further follow-up is required after 24 months to exclude possible slow growing adenocarcinoma (ERICKA)     This report was finalized on 8/4/2022 4:08 PM by Silvestre Baeza.        Measures:   Tobacco:   Kennedy Griffith  reports that he has never smoked. He has never used smokeless tobacco.. I have educated him on the risk of diseases from using tobacco products.  Assessment / Plan      Assessment/Plan:   Mr. Griffith is a 61 y.o. male who presented today  "for evaluation of gross hematuria, recent episode of left greater than right flank pain.  CT imaging revealed 8 mm x 10 mm right renal pelvis stone, no left-sided nephrolithiasis or ureterolithiasis.    The patient was counseled regarding the possible etiologies, relevant work-up and diagnostic approach for gross hematuria, as well as the relevant risk categories as assigned by the American Urological Association guidelines.  I discussed that the definition of microscopic hematuria includes a microscopic urinalysis (not dipstick UA) positive for greater than 3 RBCs per high-powered field under microscopy.  We also discussed that any history of gross hematuria (or visible hematuria) places a patient at higher risk for occult urologic malignancies and necessitates prompt workup.  We discussed the aforementioned risk categories as assigned by the AUA, which are depicted below.  Ultimately, work-up is mandatory for gross or visible hematuria, as indicated by the \"HIGH RISK\" category and recommendations as depicted by the AUA Guidelines.  Given the patient's age, report of gross hematuria the patient is deemed HIGH risk, and for these reasons I recommend proceeding with a flexible diagnostic cystoscopy and CT urogram to assess the collecting system of the kidney and bladder.            Pending further hematuria work-up we will discuss management of nonobstructing right renal pelvis stone.    Diagnoses and all orders for this visit:    1. Gross hematuria (Primary)  -     CT Abdomen Pelvis With & Without Contrast; Future           Follow Up:   Return in about 3 weeks (around 10/19/2022) for Recheck, Follow up after Imaging.    I spent approximately 45 minutes providing clinical care for this patient; including review of patient's chart and provider documentation, face to face time spent with patient in examination room (obtaining history, performing physical exam, discussing diagnosis and management options), placing " orders, and completing patient documentation.     Tej Christine MD  [facility] [place]

## 2022-10-07 DIAGNOSIS — R91.8 PULMONARY NODULES: Primary | ICD-10-CM

## 2022-10-12 ENCOUNTER — TELEPHONE (OUTPATIENT)
Dept: INTERNAL MEDICINE | Facility: CLINIC | Age: 62
End: 2022-10-12

## 2022-10-12 NOTE — TELEPHONE ENCOUNTER
----- Message from JAYLEN Panda sent at 10/7/2022 10:35 AM EDT -----  Please let patient know that the CT of his chest shows scattered pulmonary nodules.  I would like to refer him on to pulmonology for further evaluation.  I will place this order now.

## 2022-10-18 ENCOUNTER — HOSPITAL ENCOUNTER (OUTPATIENT)
Dept: CT IMAGING | Facility: HOSPITAL | Age: 62
Discharge: HOME | End: 2022-10-18
Admitting: UROLOGY

## 2022-10-18 DIAGNOSIS — R31.0 GROSS HEMATURIA: ICD-10-CM

## 2022-10-18 PROCEDURE — 74178 CT ABD&PLV WO CNTR FLWD CNTR: CPT

## 2022-10-18 PROCEDURE — 0 IOPAMIDOL PER 1 ML: Performed by: UROLOGY

## 2022-10-18 RX ADMIN — IOPAMIDOL 148 ML: 755 INJECTION, SOLUTION INTRAVENOUS at 09:16

## 2022-10-20 DIAGNOSIS — E78.2 MIXED HYPERLIPIDEMIA: ICD-10-CM

## 2022-10-20 RX ORDER — ROSUVASTATIN CALCIUM 10 MG/1
TABLET, COATED ORAL
Qty: 90 TABLET | Refills: 0 | Status: SHIPPED | OUTPATIENT
Start: 2022-10-20

## 2022-10-26 ENCOUNTER — OFFICE VISIT (OUTPATIENT)
Dept: PULMONOLOGY | Facility: CLINIC | Age: 62
End: 2022-10-26

## 2022-10-26 VITALS
HEART RATE: 81 BPM | DIASTOLIC BLOOD PRESSURE: 76 MMHG | WEIGHT: 221 LBS | OXYGEN SATURATION: 96 % | SYSTOLIC BLOOD PRESSURE: 120 MMHG | TEMPERATURE: 98.6 F | HEIGHT: 69 IN | BODY MASS INDEX: 32.73 KG/M2

## 2022-10-26 DIAGNOSIS — R91.8 MULTIPLE LUNG NODULES ON CT: ICD-10-CM

## 2022-10-26 DIAGNOSIS — R06.02 SOB (SHORTNESS OF BREATH): Primary | ICD-10-CM

## 2022-10-26 PROCEDURE — 94729 DIFFUSING CAPACITY: CPT | Performed by: INTERNAL MEDICINE

## 2022-10-26 PROCEDURE — 99204 OFFICE O/P NEW MOD 45 MIN: CPT | Performed by: INTERNAL MEDICINE

## 2022-10-26 PROCEDURE — 94726 PLETHYSMOGRAPHY LUNG VOLUMES: CPT | Performed by: INTERNAL MEDICINE

## 2022-10-26 PROCEDURE — 94010 BREATHING CAPACITY TEST: CPT | Performed by: INTERNAL MEDICINE

## 2022-11-22 ENCOUNTER — OFFICE VISIT (OUTPATIENT)
Dept: UROLOGY | Facility: CLINIC | Age: 62
End: 2022-11-22

## 2022-11-22 VITALS — WEIGHT: 221 LBS | BODY MASS INDEX: 32.73 KG/M2 | HEIGHT: 69 IN

## 2022-11-22 DIAGNOSIS — N20.0 RIGHT RENAL STONE: ICD-10-CM

## 2022-11-22 DIAGNOSIS — R31.0 GROSS HEMATURIA: Primary | ICD-10-CM

## 2022-11-22 PROCEDURE — 99215 OFFICE O/P EST HI 40 MIN: CPT | Performed by: UROLOGY

## 2022-11-26 PROBLEM — N20.0 RIGHT RENAL STONE: Status: ACTIVE | Noted: 2022-11-26

## 2022-11-26 PROBLEM — R31.0 GROSS HEMATURIA: Status: ACTIVE | Noted: 2022-11-26

## 2022-11-26 NOTE — PROGRESS NOTES
Follow Up Office Visit      Patient Name: Kennedy Griffith  : 1960   MRN: [id number]     Chief Complaint: Gross hematuria, right renal stone    History of Present Illness: Kennedy Griffith is a 62 y.o. male who presents today for continued evaluation due to history of gross hematuria.  He has had intermittent episodes of gross hematuria.  He presents today with CT imaging and for cystoscopy for hematuria work-up.  CT imaging has revealed 7 x 5 mm right renal pelvis stone.  He denies flank pain or lower urinary tract symptoms.  Denies dysuria.    Subjective      Review of System: Review of Systems   Constitutional: Negative for chills, fatigue, fever and unexpected weight change.   HENT: Negative for sore throat.    Eyes: Negative for visual disturbance.   Respiratory: Negative for cough, chest tightness and shortness of breath.    Cardiovascular: Negative for chest pain and leg swelling.   Gastrointestinal: Negative for blood in stool, constipation, diarrhea, nausea, rectal pain and vomiting.   Genitourinary: Negative for decreased urine volume, difficulty urinating, dysuria, enuresis, flank pain, frequency, genital sores, hematuria and urgency.   Musculoskeletal: Negative for back pain and joint swelling.   Skin: Negative for rash and wound.   Neurological: Negative for seizures, speech difficulty, weakness and headaches.   Psychiatric/Behavioral: Negative for confusion, sleep disturbance and suicidal ideas. The patient is not nervous/anxious.       I have reviewed the ROS documented by my clinical staff, updated as appropriate and I agree. Tej Christine MD    I have reviewed and the following portions of the patient's history were updated as appropriate: past family history, past medical history, past social history, past surgical history and problem list.    Medications:     Current Outpatient Medications:   •  Adderall XR 10 MG 24 hr capsule, TAKE 1 ORAL CAPSULE EVERY MORNING WITH XR 30 MG, Disp: ,  "Rfl:   •  Adderall XR 30 MG 24 hr capsule, Take 30 mg by mouth Daily, Disp: , Rfl:   •  amphetamine-dextroamphetamine (ADDERALL) 20 MG tablet, Take 20 mg by mouth Daily. In the afternoon, Disp: , Rfl:   •  busPIRone (BUSPAR) 15 MG tablet, Take 1 tablet by mouth 3 (Three) Times a Day., Disp: 270 tablet, Rfl: 1  •  DULoxetine (CYMBALTA) 60 MG capsule, TAKE 1 CAPSULE BY MOUTH EVERY DAY, Disp: 90 capsule, Rfl: 0  •  ibuprofen (ADVIL,MOTRIN) 800 MG tablet, TAKE 1 TABLET BY MOUTH EVERY 8 (EIGHT) HOURS AS NEEDED FOR MILD PAIN ., Disp: 90 tablet, Rfl: 2  •  rosuvastatin (CRESTOR) 10 MG tablet, TAKE 1 TABLET BY MOUTH EVERY DAY, Disp: 90 tablet, Rfl: 0    Allergies:   No Known Allergies      Objective     Physical Exam:   Vital Signs:   Vitals:    11/22/22 1104   Weight: 100 kg (221 lb)   Height: 175.3 cm (69.02\")   PainSc: 0-No pain     Body mass index is 32.62 kg/m².     Physical Exam  Vitals and nursing note reviewed.   Constitutional:       Appearance: Normal appearance.   HENT:      Head: Normocephalic and atraumatic.   Cardiovascular:      Comments: Well perfused  Pulmonary:      Effort: Pulmonary effort is normal.   Abdominal:      General: Abdomen is flat.      Palpations: Abdomen is soft.   Musculoskeletal:         General: Normal range of motion.   Skin:     General: Skin is warm and dry.   Neurological:      General: No focal deficit present.      Mental Status: He is alert and oriented to person, place, and time. Mental status is at baseline.   Psychiatric:         Mood and Affect: Mood normal.         Behavior: Behavior normal.         Thought Content: Thought content normal.         Judgment: Judgment normal.       Labs:   Brief Urine Lab Results     None          Urine Culture    Urine Culture 8/4/22   Urine Culture <10,000 CFU/mL Escherichia coli (A)   (A) Abnormal value               Lab Results   Component Value Date    GLUCOSE 120 (H) 08/04/2022    CALCIUM 9.3 08/04/2022     08/04/2022    K 3.9 " 08/04/2022    CO2 25.0 08/04/2022     08/04/2022    BUN 12 08/04/2022    CREATININE 0.90 09/21/2022    EGFRIFAFRI 86 01/14/2022    EGFRIFNONA 71 01/14/2022    BCR 11.7 08/04/2022    ANIONGAP 12.0 08/04/2022       Lab Results   Component Value Date    WBC 6.90 08/04/2022    HGB 16.1 08/04/2022    HCT 46.3 08/04/2022    MCV 83.4 08/04/2022     08/04/2022       Images:   CT Abdomen Pelvis With & Without Contrast    Result Date: 10/18/2022  7 x 5 mm nonobstructing calculus within the right renal pelvis. No ureteral stone. No discrete or suspicious filling defects within the renal collecting systems on the delayed phase images.  Hepatic steatosis.  This report was finalized on 10/18/2022 10:19 AM by Raphael Cam MD.        CT Abdomen Pelvis Stone Protocol    Result Date: 8/4/2022   1. No evidence of renal calculi or obstructive uropathy on the left. 2. 8 mm calculus within the renal pelvis on the right without evidence of obstruction at this time. 3. Low-attenuation lesions within the left kidney are indeterminate on noncontrast imaging but statistically likely represent cysts. 4. Heterogeneous appearance of the liver with areas of decreased attenuation in geographic sparing favored represent hepatic steatosis. Please correlate with liver function test 6. Noncalcified nodules at the lung bases measuring up to 4 mm. Consider optional follow-up at one year  The Fleischner society pulmonary nodule recommendations are for the follow-up and management of pulmonary nodules smaller than 8 mm detected incidentally in patients >35 years on non-screening CT. The initial guidelines for the management of solid nodules were released in 2005 1, and guidelines for the management of subsolid nodules were released in 2013 2. New revised 2017 recommendations for both solid and subsolid have since been released 4.  2017 guidelines Solid nodules Multiple nodules size: <6 mm *  low risk patients: no routine follow-up *  high  risk patients: optional CT at 12 months *  Note: newly detected indeterminate nodule in persons 35 years of age or older. *  low risk patients: minimal or absent history of smoking and or other known risk factors *  high risk patients: history of smoking or of other known risk factors (e.g. first degree relative with lung cancer, or exposure to asbestos, radon, uranium) *  if a nodule up to 8 mm is partly solid or is ground glass further follow-up is required after 24 months to exclude possible slow growing adenocarcinoma (ERICKA)     This report was finalized on 8/4/2022 4:08 PM by Silvestre Baeza.        Measures:   Tobacco:   Kennedy Griffith  reports that he has never smoked. He has never used smokeless tobacco.. I have educated him on the risk of diseases from using tobacco products.    Assessment / Plan      Assessment/Plan:   62 y.o. male is seen today for follow up for continued evaluation/hematuria work-up.  He presents today with CT imaging and for cystoscopy.  CT urography has demonstrated 8 mm right renal pelvis stone, no concerning renal lesions or collecting system defect.  Today we have reviewed and discussed imaging.  We have discussed that large nonobstructing renal stone is most likely source for hematuria.  We have discussed cystoscopy for evaluation of the urinary bladder.  We have discussed the risks and benefits of operative intervention for large nonobstructing stone.  We have discussed procedures including ESWL or ureteroscopy and laser lithotripsy.  The patient would like to undergo ureteroscopy and laser lithotripsy after our discussion.  We have also discussed that we will perform diagnostic cystoscopy at that time for completion of hematuria work-up.  He would like to proceed with above intervention in 12/2022.    Diagnoses and all orders for this visit:    1. Gross hematuria (Primary)    2. Right renal stone  -     External Facility Surgical/Procedural Request; Future        I spent  approximately 40 minutes providing clinical care for this patient; including review of patient's chart and provider documentation, face to face time spent with patient in examination room (obtaining history, performing physical exam, discussing diagnosis and management options), placing orders, and completing patient documentation.     Tej Christine MD  [facility] [place]

## 2022-11-28 ENCOUNTER — TELEPHONE (OUTPATIENT)
Dept: UROLOGY | Facility: CLINIC | Age: 62
End: 2022-11-28

## 2022-11-28 NOTE — TELEPHONE ENCOUNTER
Lvm for patient to return my call. [facility] does not accept patients insurance - will 12/16 at Main OR work with patients schedule?

## 2022-11-29 ENCOUNTER — PREP FOR SURGERY (OUTPATIENT)
Dept: OTHER | Facility: HOSPITAL | Age: 62
End: 2022-11-29

## 2022-11-29 DIAGNOSIS — N20.0 RENAL STONE: Primary | ICD-10-CM

## 2022-11-29 RX ORDER — SCOLOPAMINE TRANSDERMAL SYSTEM 1 MG/1
1 PATCH, EXTENDED RELEASE TRANSDERMAL CONTINUOUS
Status: CANCELLED | OUTPATIENT
Start: 2022-11-29 | End: 2022-12-02

## 2022-11-29 RX ORDER — CEFAZOLIN SODIUM 2 G/100ML
2 INJECTION, SOLUTION INTRAVENOUS ONCE
Status: CANCELLED | OUTPATIENT
Start: 2022-11-29 | End: 2022-11-29

## 2022-11-29 RX ORDER — GABAPENTIN 300 MG/1
600 CAPSULE ORAL ONCE
Status: CANCELLED | OUTPATIENT
Start: 2022-11-29 | End: 2022-11-29

## 2022-11-29 RX ORDER — ACETAMINOPHEN 500 MG
1000 TABLET ORAL ONCE
Status: CANCELLED | OUTPATIENT
Start: 2022-11-29 | End: 2022-11-29

## 2022-12-16 ENCOUNTER — ANESTHESIA EVENT (OUTPATIENT)
Dept: PERIOP | Facility: HOSPITAL | Age: 62
End: 2022-12-16

## 2022-12-16 ENCOUNTER — ANESTHESIA (OUTPATIENT)
Dept: PERIOP | Facility: HOSPITAL | Age: 62
End: 2022-12-16

## 2022-12-16 ENCOUNTER — APPOINTMENT (OUTPATIENT)
Dept: GENERAL RADIOLOGY | Facility: HOSPITAL | Age: 62
End: 2022-12-16

## 2022-12-16 ENCOUNTER — HOSPITAL ENCOUNTER (OUTPATIENT)
Facility: HOSPITAL | Age: 62
Setting detail: HOSPITAL OUTPATIENT SURGERY
Discharge: HOME | End: 2022-12-16
Attending: UROLOGY | Admitting: UROLOGY

## 2022-12-16 VITALS
TEMPERATURE: 98 F | OXYGEN SATURATION: 94 % | DIASTOLIC BLOOD PRESSURE: 56 MMHG | HEIGHT: 69 IN | RESPIRATION RATE: 18 BRPM | SYSTOLIC BLOOD PRESSURE: 131 MMHG | HEART RATE: 65 BPM | BODY MASS INDEX: 32.65 KG/M2 | WEIGHT: 220.46 LBS

## 2022-12-16 DIAGNOSIS — N20.0 RENAL STONE: ICD-10-CM

## 2022-12-16 DIAGNOSIS — N20.0 RIGHT RENAL STONE: Primary | ICD-10-CM

## 2022-12-16 LAB
ANION GAP SERPL CALCULATED.3IONS-SCNC: 9 MMOL/L (ref 5–15)
BUN SERPL-MCNC: 15 MG/DL (ref 8–23)
BUN/CREAT SERPL: 18.1 (ref 7–25)
CALCIUM SPEC-SCNC: 8.9 MG/DL (ref 8.6–10.5)
CHLORIDE SERPL-SCNC: 107 MMOL/L (ref 98–107)
CO2 SERPL-SCNC: 24 MMOL/L (ref 22–29)
CREAT SERPL-MCNC: 0.83 MG/DL (ref 0.76–1.27)
DEPRECATED RDW RBC AUTO: 38.4 FL (ref 37–54)
EGFRCR SERPLBLD CKD-EPI 2021: 97.7 ML/MIN/1.73
ERYTHROCYTE [DISTWIDTH] IN BLOOD BY AUTOMATED COUNT: 12.5 % (ref 12.3–15.4)
GLUCOSE SERPL-MCNC: 111 MG/DL (ref 65–99)
HCT VFR BLD AUTO: 45.8 % (ref 37.5–51)
HGB BLD-MCNC: 15.8 G/DL (ref 13–17.7)
MCH RBC QN AUTO: 29.1 PG (ref 26.6–33)
MCHC RBC AUTO-ENTMCNC: 34.5 G/DL (ref 31.5–35.7)
MCV RBC AUTO: 84.3 FL (ref 79–97)
PLATELET # BLD AUTO: 311 10*3/MM3 (ref 140–450)
PMV BLD AUTO: 9.5 FL (ref 6–12)
POTASSIUM SERPL-SCNC: 3.8 MMOL/L (ref 3.5–5.2)
QT INTERVAL: 392 MS
QTC INTERVAL: 452 MS
RBC # BLD AUTO: 5.43 10*6/MM3 (ref 4.14–5.8)
SODIUM SERPL-SCNC: 140 MMOL/L (ref 136–145)
WBC NRBC COR # BLD: 6.19 10*3/MM3 (ref 3.4–10.8)

## 2022-12-16 PROCEDURE — 80048 BASIC METABOLIC PNL TOTAL CA: CPT | Performed by: ANESTHESIOLOGY

## 2022-12-16 PROCEDURE — C1894 INTRO/SHEATH, NON-LASER: HCPCS | Performed by: UROLOGY

## 2022-12-16 PROCEDURE — 25010000002 CEFAZOLIN IN DEXTROSE 2-4 GM/100ML-% SOLUTION: Performed by: UROLOGY

## 2022-12-16 PROCEDURE — 52351 CYSTOURETERO & OR PYELOSCOPE: CPT | Performed by: UROLOGY

## 2022-12-16 PROCEDURE — 93010 ELECTROCARDIOGRAM REPORT: CPT | Performed by: STUDENT IN AN ORGANIZED HEALTH CARE EDUCATION/TRAINING PROGRAM

## 2022-12-16 PROCEDURE — C1769 GUIDE WIRE: HCPCS | Performed by: UROLOGY

## 2022-12-16 PROCEDURE — C2617 STENT, NON-COR, TEM W/O DEL: HCPCS | Performed by: UROLOGY

## 2022-12-16 PROCEDURE — 25010000002 ONDANSETRON PER 1 MG: Performed by: NURSE ANESTHETIST, CERTIFIED REGISTERED

## 2022-12-16 PROCEDURE — 25010000002 IOPAMIDOL 61 % SOLUTION: Performed by: UROLOGY

## 2022-12-16 PROCEDURE — 93005 ELECTROCARDIOGRAM TRACING: CPT | Performed by: ANESTHESIOLOGY

## 2022-12-16 PROCEDURE — 74420 UROGRAPHY RTRGR +-KUB: CPT | Performed by: UROLOGY

## 2022-12-16 PROCEDURE — 76000 FLUOROSCOPY <1 HR PHYS/QHP: CPT

## 2022-12-16 PROCEDURE — 25010000002 PROPOFOL 10 MG/ML EMULSION: Performed by: NURSE ANESTHETIST, CERTIFIED REGISTERED

## 2022-12-16 PROCEDURE — 25010000002 DEXAMETHASONE PER 1 MG: Performed by: NURSE ANESTHETIST, CERTIFIED REGISTERED

## 2022-12-16 PROCEDURE — 52332 CYSTOSCOPY AND TREATMENT: CPT | Performed by: UROLOGY

## 2022-12-16 PROCEDURE — 85027 COMPLETE CBC AUTOMATED: CPT | Performed by: ANESTHESIOLOGY

## 2022-12-16 PROCEDURE — 25010000002 KETOROLAC TROMETHAMINE PER 15 MG: Performed by: NURSE ANESTHETIST, CERTIFIED REGISTERED

## 2022-12-16 PROCEDURE — C1758 CATHETER, URETERAL: HCPCS | Performed by: UROLOGY

## 2022-12-16 DEVICE — URETERAL STENT
Type: IMPLANTABLE DEVICE | Site: URETER | Status: FUNCTIONAL
Brand: PERCUFLEX™ PLUS

## 2022-12-16 RX ORDER — IPRATROPIUM BROMIDE AND ALBUTEROL SULFATE 2.5; .5 MG/3ML; MG/3ML
3 SOLUTION RESPIRATORY (INHALATION) ONCE AS NEEDED
Status: DISCONTINUED | OUTPATIENT
Start: 2022-12-16 | End: 2022-12-16 | Stop reason: HOSPADM

## 2022-12-16 RX ORDER — SODIUM CHLORIDE 0.9 % (FLUSH) 0.9 %
3 SYRINGE (ML) INJECTION EVERY 12 HOURS SCHEDULED
Status: DISCONTINUED | OUTPATIENT
Start: 2022-12-16 | End: 2022-12-16 | Stop reason: HOSPADM

## 2022-12-16 RX ORDER — ACETAMINOPHEN 500 MG
1000 TABLET ORAL ONCE
Status: COMPLETED | OUTPATIENT
Start: 2022-12-16 | End: 2022-12-16

## 2022-12-16 RX ORDER — HYDROMORPHONE HYDROCHLORIDE 1 MG/ML
0.5 INJECTION, SOLUTION INTRAMUSCULAR; INTRAVENOUS; SUBCUTANEOUS
Status: DISCONTINUED | OUTPATIENT
Start: 2022-12-16 | End: 2022-12-16 | Stop reason: HOSPADM

## 2022-12-16 RX ORDER — MIDAZOLAM HYDROCHLORIDE 1 MG/ML
1 INJECTION INTRAMUSCULAR; INTRAVENOUS
Status: DISCONTINUED | OUTPATIENT
Start: 2022-12-16 | End: 2022-12-16 | Stop reason: HOSPADM

## 2022-12-16 RX ORDER — MAGNESIUM HYDROXIDE 1200 MG/15ML
LIQUID ORAL AS NEEDED
Status: DISCONTINUED | OUTPATIENT
Start: 2022-12-16 | End: 2022-12-16 | Stop reason: HOSPADM

## 2022-12-16 RX ORDER — FAMOTIDINE 10 MG/ML
20 INJECTION, SOLUTION INTRAVENOUS ONCE
Status: CANCELLED | OUTPATIENT
Start: 2022-12-16 | End: 2022-12-16

## 2022-12-16 RX ORDER — LIDOCAINE HYDROCHLORIDE 10 MG/ML
0.5 INJECTION, SOLUTION EPIDURAL; INFILTRATION; INTRACAUDAL; PERINEURAL ONCE AS NEEDED
Status: COMPLETED | OUTPATIENT
Start: 2022-12-16 | End: 2022-12-16

## 2022-12-16 RX ORDER — SODIUM CHLORIDE 0.9 % (FLUSH) 0.9 %
10 SYRINGE (ML) INJECTION EVERY 12 HOURS SCHEDULED
Status: DISCONTINUED | OUTPATIENT
Start: 2022-12-16 | End: 2022-12-16 | Stop reason: HOSPADM

## 2022-12-16 RX ORDER — HYDRALAZINE HYDROCHLORIDE 20 MG/ML
5 INJECTION INTRAMUSCULAR; INTRAVENOUS
Status: DISCONTINUED | OUTPATIENT
Start: 2022-12-16 | End: 2022-12-16 | Stop reason: HOSPADM

## 2022-12-16 RX ORDER — CEFAZOLIN SODIUM 2 G/100ML
2 INJECTION, SOLUTION INTRAVENOUS ONCE
Status: COMPLETED | OUTPATIENT
Start: 2022-12-16 | End: 2022-12-16

## 2022-12-16 RX ORDER — DROPERIDOL 2.5 MG/ML
0.62 INJECTION, SOLUTION INTRAMUSCULAR; INTRAVENOUS
Status: DISCONTINUED | OUTPATIENT
Start: 2022-12-16 | End: 2022-12-16 | Stop reason: HOSPADM

## 2022-12-16 RX ORDER — KETOROLAC TROMETHAMINE 30 MG/ML
INJECTION, SOLUTION INTRAMUSCULAR; INTRAVENOUS AS NEEDED
Status: DISCONTINUED | OUTPATIENT
Start: 2022-12-16 | End: 2022-12-16 | Stop reason: SURG

## 2022-12-16 RX ORDER — SODIUM CHLORIDE 0.9 % (FLUSH) 0.9 %
10 SYRINGE (ML) INJECTION AS NEEDED
Status: DISCONTINUED | OUTPATIENT
Start: 2022-12-16 | End: 2022-12-16 | Stop reason: HOSPADM

## 2022-12-16 RX ORDER — HYDROCODONE BITARTRATE AND ACETAMINOPHEN 5; 325 MG/1; MG/1
TABLET ORAL
Status: COMPLETED
Start: 2022-12-16 | End: 2022-12-16

## 2022-12-16 RX ORDER — SCOLOPAMINE TRANSDERMAL SYSTEM 1 MG/1
1 PATCH, EXTENDED RELEASE TRANSDERMAL CONTINUOUS
Status: DISCONTINUED | OUTPATIENT
Start: 2022-12-16 | End: 2022-12-16 | Stop reason: HOSPADM

## 2022-12-16 RX ORDER — FAMOTIDINE 20 MG/1
20 TABLET, FILM COATED ORAL ONCE
Status: COMPLETED | OUTPATIENT
Start: 2022-12-16 | End: 2022-12-16

## 2022-12-16 RX ORDER — NITROFURANTOIN 25; 75 MG/1; MG/1
100 CAPSULE ORAL 2 TIMES DAILY
Qty: 14 CAPSULE | Refills: 0 | Status: SHIPPED | OUTPATIENT
Start: 2022-12-16

## 2022-12-16 RX ORDER — MEPERIDINE HYDROCHLORIDE 25 MG/ML
12.5 INJECTION INTRAMUSCULAR; INTRAVENOUS; SUBCUTANEOUS
Status: DISCONTINUED | OUTPATIENT
Start: 2022-12-16 | End: 2022-12-16 | Stop reason: HOSPADM

## 2022-12-16 RX ORDER — PROMETHAZINE HYDROCHLORIDE 25 MG/1
25 TABLET ORAL ONCE AS NEEDED
Status: DISCONTINUED | OUTPATIENT
Start: 2022-12-16 | End: 2022-12-16 | Stop reason: HOSPADM

## 2022-12-16 RX ORDER — SODIUM CHLORIDE 9 MG/ML
40 INJECTION, SOLUTION INTRAVENOUS AS NEEDED
Status: DISCONTINUED | OUTPATIENT
Start: 2022-12-16 | End: 2022-12-16 | Stop reason: HOSPADM

## 2022-12-16 RX ORDER — LABETALOL HYDROCHLORIDE 5 MG/ML
5 INJECTION, SOLUTION INTRAVENOUS
Status: DISCONTINUED | OUTPATIENT
Start: 2022-12-16 | End: 2022-12-16 | Stop reason: HOSPADM

## 2022-12-16 RX ORDER — DEXAMETHASONE SODIUM PHOSPHATE 4 MG/ML
INJECTION, SOLUTION INTRA-ARTICULAR; INTRALESIONAL; INTRAMUSCULAR; INTRAVENOUS; SOFT TISSUE AS NEEDED
Status: DISCONTINUED | OUTPATIENT
Start: 2022-12-16 | End: 2022-12-16 | Stop reason: SURG

## 2022-12-16 RX ORDER — ONDANSETRON 2 MG/ML
4 INJECTION INTRAMUSCULAR; INTRAVENOUS ONCE AS NEEDED
Status: DISCONTINUED | OUTPATIENT
Start: 2022-12-16 | End: 2022-12-16 | Stop reason: HOSPADM

## 2022-12-16 RX ORDER — PROMETHAZINE HYDROCHLORIDE 25 MG/1
25 SUPPOSITORY RECTAL ONCE AS NEEDED
Status: DISCONTINUED | OUTPATIENT
Start: 2022-12-16 | End: 2022-12-16 | Stop reason: HOSPADM

## 2022-12-16 RX ORDER — FENTANYL CITRATE 50 UG/ML
50 INJECTION, SOLUTION INTRAMUSCULAR; INTRAVENOUS
Status: DISCONTINUED | OUTPATIENT
Start: 2022-12-16 | End: 2022-12-16 | Stop reason: HOSPADM

## 2022-12-16 RX ORDER — DROPERIDOL 2.5 MG/ML
0.62 INJECTION, SOLUTION INTRAMUSCULAR; INTRAVENOUS ONCE AS NEEDED
Status: DISCONTINUED | OUTPATIENT
Start: 2022-12-16 | End: 2022-12-16 | Stop reason: HOSPADM

## 2022-12-16 RX ORDER — GABAPENTIN 300 MG/1
600 CAPSULE ORAL ONCE
Status: COMPLETED | OUTPATIENT
Start: 2022-12-16 | End: 2022-12-16

## 2022-12-16 RX ORDER — HYDROCODONE BITARTRATE AND ACETAMINOPHEN 5; 325 MG/1; MG/1
1 TABLET ORAL ONCE AS NEEDED
Status: DISCONTINUED | OUTPATIENT
Start: 2022-12-16 | End: 2022-12-16 | Stop reason: HOSPADM

## 2022-12-16 RX ORDER — LIDOCAINE HYDROCHLORIDE 20 MG/ML
JELLY TOPICAL AS NEEDED
Status: DISCONTINUED | OUTPATIENT
Start: 2022-12-16 | End: 2022-12-16 | Stop reason: HOSPADM

## 2022-12-16 RX ORDER — TAMSULOSIN HYDROCHLORIDE 0.4 MG/1
1 CAPSULE ORAL DAILY
Qty: 14 CAPSULE | Refills: 0 | Status: SHIPPED | OUTPATIENT
Start: 2022-12-16 | End: 2022-12-30

## 2022-12-16 RX ORDER — PHENAZOPYRIDINE HYDROCHLORIDE 200 MG/1
200 TABLET, FILM COATED ORAL 3 TIMES DAILY PRN
Qty: 20 TABLET | Refills: 0 | Status: SHIPPED | OUTPATIENT
Start: 2022-12-16

## 2022-12-16 RX ORDER — OXYBUTYNIN CHLORIDE 5 MG/1
5 TABLET, EXTENDED RELEASE ORAL DAILY
Qty: 14 TABLET | Refills: 0 | Status: SHIPPED | OUTPATIENT
Start: 2022-12-16

## 2022-12-16 RX ORDER — PROPOFOL 10 MG/ML
VIAL (ML) INTRAVENOUS AS NEEDED
Status: DISCONTINUED | OUTPATIENT
Start: 2022-12-16 | End: 2022-12-16 | Stop reason: SURG

## 2022-12-16 RX ORDER — ONDANSETRON 2 MG/ML
INJECTION INTRAMUSCULAR; INTRAVENOUS AS NEEDED
Status: DISCONTINUED | OUTPATIENT
Start: 2022-12-16 | End: 2022-12-16 | Stop reason: SURG

## 2022-12-16 RX ORDER — NALOXONE HCL 0.4 MG/ML
0.4 VIAL (ML) INJECTION AS NEEDED
Status: DISCONTINUED | OUTPATIENT
Start: 2022-12-16 | End: 2022-12-16 | Stop reason: HOSPADM

## 2022-12-16 RX ORDER — ROCURONIUM BROMIDE 10 MG/ML
INJECTION, SOLUTION INTRAVENOUS AS NEEDED
Status: DISCONTINUED | OUTPATIENT
Start: 2022-12-16 | End: 2022-12-16 | Stop reason: SURG

## 2022-12-16 RX ORDER — SODIUM CHLORIDE 0.9 % (FLUSH) 0.9 %
3-10 SYRINGE (ML) INJECTION AS NEEDED
Status: DISCONTINUED | OUTPATIENT
Start: 2022-12-16 | End: 2022-12-16 | Stop reason: HOSPADM

## 2022-12-16 RX ORDER — LIDOCAINE HYDROCHLORIDE 10 MG/ML
INJECTION, SOLUTION EPIDURAL; INFILTRATION; INTRACAUDAL; PERINEURAL AS NEEDED
Status: DISCONTINUED | OUTPATIENT
Start: 2022-12-16 | End: 2022-12-16 | Stop reason: SURG

## 2022-12-16 RX ORDER — SODIUM CHLORIDE, SODIUM LACTATE, POTASSIUM CHLORIDE, CALCIUM CHLORIDE 600; 310; 30; 20 MG/100ML; MG/100ML; MG/100ML; MG/100ML
9 INJECTION, SOLUTION INTRAVENOUS CONTINUOUS
Status: DISCONTINUED | OUTPATIENT
Start: 2022-12-16 | End: 2022-12-16 | Stop reason: HOSPADM

## 2022-12-16 RX ADMIN — SCOPALAMINE 1 PATCH: 1 PATCH, EXTENDED RELEASE TRANSDERMAL at 12:25

## 2022-12-16 RX ADMIN — DEXAMETHASONE SODIUM PHOSPHATE 4 MG: 4 INJECTION, SOLUTION INTRAMUSCULAR; INTRAVENOUS at 16:03

## 2022-12-16 RX ADMIN — ACETAMINOPHEN 1000 MG: 500 TABLET ORAL at 12:25

## 2022-12-16 RX ADMIN — KETOROLAC TROMETHAMINE 30 MG: 30 INJECTION, SOLUTION INTRAMUSCULAR at 16:03

## 2022-12-16 RX ADMIN — GABAPENTIN 600 MG: 300 CAPSULE ORAL at 12:25

## 2022-12-16 RX ADMIN — CEFAZOLIN SODIUM 2 G: 2 INJECTION, SOLUTION INTRAVENOUS at 15:26

## 2022-12-16 RX ADMIN — ROCURONIUM BROMIDE 50 MG: 50 INJECTION INTRAVENOUS at 15:28

## 2022-12-16 RX ADMIN — HYDROCODONE BITARTRATE AND ACETAMINOPHEN 1 TABLET: 5; 325 TABLET ORAL at 17:46

## 2022-12-16 RX ADMIN — FAMOTIDINE 20 MG: 20 TABLET, FILM COATED ORAL at 12:22

## 2022-12-16 RX ADMIN — LIDOCAINE HYDROCHLORIDE 50 MG: 10 INJECTION, SOLUTION EPIDURAL; INFILTRATION; INTRACAUDAL; PERINEURAL at 15:28

## 2022-12-16 RX ADMIN — ONDANSETRON 4 MG: 2 INJECTION INTRAMUSCULAR; INTRAVENOUS at 16:03

## 2022-12-16 RX ADMIN — LIDOCAINE HYDROCHLORIDE 0.5 ML: 10 INJECTION, SOLUTION EPIDURAL; INFILTRATION; INTRACAUDAL; PERINEURAL at 12:22

## 2022-12-16 RX ADMIN — PROPOFOL 200 MG: 10 INJECTION, EMULSION INTRAVENOUS at 15:28

## 2022-12-16 RX ADMIN — SODIUM CHLORIDE, POTASSIUM CHLORIDE, SODIUM LACTATE AND CALCIUM CHLORIDE 9 ML/HR: 600; 310; 30; 20 INJECTION, SOLUTION INTRAVENOUS at 12:22

## 2022-12-16 NOTE — INTERVAL H&P NOTE
[facility] Pre-op    Full history and physical note from office is attached.    VS: /89  HR 82  RR 16  T 96.2  Sat 95%RA    Immunizations:  Influenza:  2022  Pneumococcal:  No  Tetanus:  No  Covid x3: 2021      LAB Results:  Lab Results   Component Value Date    WBC 6.90 08/04/2022    HGB 16.1 08/04/2022    HCT 46.3 08/04/2022    MCV 83.4 08/04/2022     08/04/2022    NEUTROABS 4.21 08/04/2022    GLUCOSE 120 (H) 08/04/2022    BUN 12 08/04/2022    CREATININE 0.90 09/21/2022    EGFRIFNONA 71 01/14/2022    EGFRIFAFRI 86 01/14/2022     08/04/2022    K 3.9 08/04/2022     08/04/2022    CO2 25.0 08/04/2022    CALCIUM 9.3 08/04/2022    ALBUMIN 4.40 08/04/2022    AST 31 08/04/2022    ALT 44 (H) 08/04/2022    BILITOT 0.5 08/04/2022     10/18/22 CT abd:  FINDINGS:  Lung bases are clear. There are geographic regions of hypodensity  throughout the liver compatible with hepatic steatosis, with some  regions of focal fatty sparing. Normal appearance of the gallbladder,  bile ducts, spleen, pancreas, and adrenal glands.     There is a 7 x 5 mm calculus within the right renal pelvis. No  additional renal calculi are seen. No evidence of ureteral stone. No  hydronephrosis/obstructive uropathy. There is symmetric enhancement and  excretion of the kidneys. There is no discrete or suspicious filling  defect within the renal collecting systems, ureters, or partially  opacified bladder on the delayed phase images. No perinephric fat  stranding or fluid. There are couple simple appearing left renal  cortical cysts.     The prostate is enlarged with protrusion into the inferior bladder.  Unremarkable appearance of the seminal vesicles. Unremarkable appearance  of the bladder. No abdominopelvic free fluid or fat stranding. No  pneumoperitoneum. Normal appearance of the GI tract. Normal appendix. No  lymphadenopathy. Normal appearance of the vasculature. There is a tiny  fat-containing umbilical hernia.  Otherwise unremarkable appearance of  the body wall soft tissues. No acute or suspicious bony findings.      IMPRESSION:  7 x 5 mm nonobstructing calculus within the right renal pelvis. No  ureteral stone. No discrete or suspicious filling defects within the  renal collecting systems on the delayed phase images.    Cancer Staging (if applicable)  Cancer Patient: __ yes __no __unknown__N/A; If yes, clinical stage T:__ N:__M:__, stage group or __N/A      Impression: Right renal stone      Plan: URETEROSCOPY LASER LITHOTRIPSY WITH STENT INSERTION      JAYLEN Law   12/16/2022   12:04 EST

## 2022-12-16 NOTE — DISCHARGE INSTRUCTIONS
Ureteroscopy + Laser Lithotripsy Post-Operative Care/Expectations    Follow these guidelines after your procedure in order to assist with your recovery.    Anesthesia Precautions and Expectations  - Rest for 24 hours after receiving general anesthesia, make sure you have someone at home with you that can monitor you  - Do not operate a vehicle, drink alcohol, or make 'important decisions'/sign legal documentation during the immediate recovery period if you received sedation for your procedure  - You may experience a sore throat, jaw discomfort, or muscle aches related to anesthesia, these symptoms may last a few days    Activity  - You may resume your normal home activities immediately post-operatively; however, light activity is encouraged for 24 hours to prevent urinary bleeding  - Do not operate a vehicle or drink alcohol if you were prescribed narcotic pain medications     Bathing/Showering  - You may resume normal bathing and showering post-procedure    Pain/Urinary Symptoms  - You may experience burning urinary pain for a few days, and/or increased urinary urgency/frequency post-procedure for a few weeks which is expected (sometimes longer if a ureteral stent was left in place)   - A medication to prevent burning urinary pain (Phenazopyridine) may be prescribed by your doctor, take as directed  - A medication to prevent urinary urgency/frequency (sometimes referred to as “bladder spasms”) (Hyoscyamine, Oxybutynin, Mirabegron, Solifenacin, etc.) may be prescribed by your doctor for up to 1 month, take as directed     Urinary Bleeding (Hematuria)   - Some degree of light urinary bleeding (hematuria) is expected for up to 1-2 weeks (this may be as light as pink lemonade or somewhat darker like clear/pale red Gatorade); a good rule of thumb is that your urine should remain see-through    - If you experience heavy urinary bleeding (like the color and consistency of tomato juice, or red wine), large blood clots, or  you are unable to urinate for more than 8 hours you should contact your doctor and present to the nearest Emergency Department  - Drink plenty of water at home and stay hydrated, as this will help naturally flush out your bladder and urethra    Antibiotics  - Complete the antibiotic course (if) prescribed as directed to prevent urinary infection     When to call your doctor:   - Pain that is not controlled with oral medications  - Signs of significant infection: Fever 101F, shaking chills, profuse sweating, persistent nausea or vomiting, unable to tolerate food or drink   - Severe urinary bleeding or large blood clots in urine  - Inability to urinate for more than 8 hours post-surgery         STENT REMOVAL INSTRUCTIONS    A ureteral stent was left in place after your procedure, the ureteral stent is a flexible plastic tube roughly 9 to 10 inches in length which allows urine to drain from the kidney to the bladder while the inflammation in the ureter is settling down after surgery.  Without the stent in place, the ureter could be blocked due to this ureteral inflammation which could result in severe flank pain.    The stent will be required to maintain in position to passively dilate the ureter to allow passage of instruments to treat stone at next appointment.  He will follow-up in approximately 7 to 10 days for repeat operative procedure for stone clearance.  He contacted by clinic to schedule next appointment/procedure

## 2022-12-16 NOTE — ANESTHESIA PREPROCEDURE EVALUATION
Anesthesia Evaluation     Patient summary reviewed and Nursing notes reviewed   no history of anesthetic complications:  NPO Solid Status: > 8 hours  NPO Liquid Status: > 8 hours           Airway   Mallampati: II  TM distance: >3 FB  Neck ROM: full  No difficulty expected  Dental      Pulmonary - negative pulmonary ROS and normal exam   Cardiovascular - normal exam    (+) hyperlipidemia,       Neuro/Psych  (+) headaches, psychiatric history,    GI/Hepatic/Renal/Endo    (+)   renal disease,     Musculoskeletal     Abdominal    Substance History      OB/GYN          Other                        Anesthesia Plan    ASA 2     general     intravenous induction     Anesthetic plan, risks, benefits, and alternatives have been provided, discussed and informed consent has been obtained with: patient.    Plan discussed with CRNA.        CODE STATUS:

## 2022-12-16 NOTE — ANESTHESIA POSTPROCEDURE EVALUATION
Patient: Kennedy Griffith    Procedure Summary     Date: 12/16/22 Room / Location:  [facility] /  [facility]    Anesthesia Start: 1521 Anesthesia Stop: 1638    Procedure: CYSTOSCOPY, RIGHT URETEROSCOPY RETROGRADE WITH STENT INSERTION (Right) Diagnosis:       Renal stone      (Renal stone [N20.0])    Surgeons: Tej Christine MD Provider: Chandan So MD    Anesthesia Type: general ASA Status: 2          Anesthesia Type: general    Vitals  Vitals Value Taken Time   /86 12/16/22 1730   Temp 98 °F (36.7 °C) 12/16/22 1730   Pulse 60 12/16/22 1730   Resp 18 12/16/22 1730   SpO2 97 % 12/16/22 1730           Post Anesthesia Care and Evaluation    Patient location during evaluation: PACU  Patient participation: complete - patient participated  Level of consciousness: awake and alert  Pain management: adequate    Airway patency: patent  Anesthetic complications: No anesthetic complications  PONV Status: none  Cardiovascular status: hemodynamically stable and acceptable  Respiratory status: nonlabored ventilation, acceptable and nasal cannula  Hydration status: acceptable    Comments: Long Pacu hold, waiting in Or, pt without problems.  Thank you

## 2022-12-16 NOTE — ANESTHESIA PROCEDURE NOTES
Airway  Urgency: elective    Date/Time: 12/16/2022 3:30 PM  Airway not difficult    General Information and Staff    Patient location during procedure: OR  CRNA/CAA: Best Schroeder CRNA    Indications and Patient Condition  Indications for airway management: airway protection    Preoxygenated: yes  MILS not maintained throughout  Mask difficulty assessment: 1 - vent by mask    Final Airway Details  Final airway type: endotracheal airway      Successful airway: ETT  Cuffed: yes   Successful intubation technique: direct laryngoscopy  Facilitating devices/methods: Bougie  Endotracheal tube insertion site: oral  Blade: Krueger  Blade size: 2  ETT size (mm): 7.5  Cormack-Lehane Classification: grade IIb - view of arytenoids or posterior of glottis only  Placement verified by: chest auscultation and capnometry   Measured from: lips  ETT/EBT  to lips (cm): 20  Number of attempts at approach: 1  Assessment: lips, teeth, and gum same as pre-op and atraumatic intubation    Additional Comments  Negative epigastric sounds, Breath sound equal bilaterally with symmetric chest rise and fall        
no

## 2022-12-17 NOTE — OP NOTE
URETEROSCOPY LASER LITHOTRIPSY WITH STENT INSERTION  Procedure Report    Patient Name:  Kennedy Griffith  YOB: 1960    Date of Surgery:  12/16/2022     Indications: 62-year-old male with history of gross hematuria.  Hematuria evaluation unrevealing outside of 7 mm x 5 mm right renal pelvis stone.  After discussion of risks and benefits of operative intervention the patient elects to proceed.    Pre-op Diagnosis:   Renal stone [N20.0]       Post-Op Diagnosis Codes:     * Renal stone [N20.0]      Procedure(s):  CYSTOSCOPY,  RIGHT  URETEROSCOPY  RIGHT RETROGRADE PYELOGRAM < 1 HR  RIGHT URETERAL STENT PLACEMENT    Staff:  Surgeon(s):  Tej Christine MD         Anesthesia: General    Estimated Blood Loss: none    Implants:    Implant Name Type Inv. Item Serial No.  Lot No. LRB No. Used Action   STNT PERCUFLX NO [id number] - [id number] - [id number] Stent STNT PERCUFLX NO GW [id number] [facility] [id number] Right 1 Implanted       Specimen:          None        Findings:   1.  Normal urinary bladder without evidence of tumor stone or foreign body.  2.  Right ureteroscopy with narrowing of the distal ureter encountered.  Attempt at dilation with a 8/10 dilator, inability to pass ureteroscope beyond the area of narrowing.  Decision was made to place ureteral stent for passive dilation  3.  Right retrograde pyelogram with imaging filling the distal mid proximal ureter as well as renal collecting system.  4.  Successful placement of 4.8 x 26 cm double-J ureteral stent without string    Complications: None immediate    Description of Procedure:     After informed consent, the patient was brought back to the operating suite and moved over to the operating table. General anesthesia was smoothly induced, IV antibiotics were administered, and the patient was placed in the dorsal lithotomy position with careful attention focused on padding all pressure points. The patient was prepped and draped  in standard fashion. A timeout was performed to ensure the correct patient and procedure    A 22Fr cystoscope was used to cannulate the urethra. The urethra was of normal course and caliber.  Upon entering the bladder, pan-cystoscopy revealed no bladder abnormalities.  The bilateral ureteral orifices were visualized in their orthotopic positions. Attention was then turned to the right ureteral orifice which was cannulated with a  sensor wire was advanced up the right ureter and into the collecting system on fluoroscopy to serve as a safety wire which was clamped to the surgical drapes.     SEMIRIGID URETEROSCOPY  The semi-rigid ureteroscope was then advanced into the bladder. The right ureter was cannulated, and passed in the distal ureter.  There was area of narrowing, a second wire was passed and there was difficulty with passage of semirigid ureteroscope beyond this point.  Semirigid ureteroscope was removed maintaining wire position.        NO ACCESS SHEATH  We then switched to pressure-bag irrigation and a flexible ureteroscope was advanced over the second guidewire.  The scope was unable to be passed beyond the area of narrowing under live fluoroscopy.  The scope was removed.  An 8/10 dilator was passed over wire.  After dilation flexible ureteroscope was attempted to be passed again.  It was unable to reverse area of narrowing.  At this point it was felt that ureteral stent should be placed to allow passive dilation.      CYSTO PLACEMENT  We switched back to the rigid cystoscope which was reinserted over the existing guidewire. A 4.8 F x 26cm double J ureteral stent was advanced up the right ureter under direct visualization which confirmed good curl in the bladder. Fluoroscopy confirmed good curl in the kidney. The bladder was drained and this concluded our procedure.    The bladder was drained, and this concluded our procedure.        The patient was brought back to the PACU in stable condition. All scopes  and instruments were in good working order at the end of the case. There were no complications.      Disposition:  The patient will be discharged remaining appropriate PACU criteria.  He will be discharged with ureteral stent in place, this will be maintained in position 7 to 10 days to allow passive dilation.  He will return for second stage procedure and stone treatment.  It was discussed that ureteral stents or not permanent he must follow-up for treatment.  He contacted by office to schedule appointment/procedure date and time.          Tej Christine MD     Date: 12/16/2022  Time: 20:55 EST

## 2022-12-19 ENCOUNTER — PREP FOR SURGERY (OUTPATIENT)
Dept: OTHER | Facility: HOSPITAL | Age: 62
End: 2022-12-19

## 2022-12-19 DIAGNOSIS — N20.0 RENAL STONE: Primary | ICD-10-CM

## 2022-12-19 RX ORDER — ACETAMINOPHEN 500 MG
1000 TABLET ORAL ONCE
Status: CANCELLED | OUTPATIENT
Start: 2022-12-19 | End: 2022-12-19

## 2022-12-19 RX ORDER — GABAPENTIN 300 MG/1
600 CAPSULE ORAL ONCE
Status: CANCELLED | OUTPATIENT
Start: 2022-12-19 | End: 2022-12-19

## 2022-12-19 RX ORDER — SCOLOPAMINE TRANSDERMAL SYSTEM 1 MG/1
1 PATCH, EXTENDED RELEASE TRANSDERMAL CONTINUOUS
Status: CANCELLED | OUTPATIENT
Start: 2022-12-19 | End: 2022-12-22

## 2022-12-19 RX ORDER — CEFAZOLIN SODIUM 2 G/100ML
2 INJECTION, SOLUTION INTRAVENOUS ONCE
Status: CANCELLED | OUTPATIENT
Start: 2022-12-19 | End: 2022-12-19

## 2022-12-20 NOTE — ANESTHESIA POSTPROCEDURE EVALUATION
Patient: Kennedy Griffith    Procedure Summary     Date: 12/16/22 Room / Location:  [facility]    Anesthesia Start: 1521 Anesthesia Stop: 1638    Procedure: CYSTOSCOPY, RIGHT URETEROSCOPY RETROGRADE WITH STENT INSERTION (Right) Diagnosis:       Renal stone      (Renal stone [N20.0])    Surgeons: Tej Christine MD Provider: Chandan So MD    Anesthesia Type: general ASA Status: 2          Anesthesia Type: general    Vitals  Vitals Value Taken Time   /86 12/16/22 1730   Temp 98 °F (36.7 °C) 12/16/22 1730   Pulse 60 12/16/22 1730   Resp 18 12/16/22 1730   SpO2 97 % 12/16/22 1730           Post Anesthesia Care and Evaluation    Patient location during evaluation: PACU  Patient participation: complete - patient participated  Level of consciousness: awake and alert  Pain management: adequate    Airway patency: patent  Anesthetic complications: No anesthetic complications  PONV Status: none  Cardiovascular status: hemodynamically stable and acceptable  Respiratory status: nonlabored ventilation, acceptable and nasal cannula  Hydration status: acceptable

## 2022-12-21 ENCOUNTER — PRE-ADMISSION TESTING (OUTPATIENT)
Dept: PREADMISSION TESTING | Facility: HOSPITAL | Age: 62
End: 2022-12-21

## 2022-12-21 ENCOUNTER — ANESTHESIA EVENT (OUTPATIENT)
Dept: PERIOP | Facility: HOSPITAL | Age: 62
End: 2022-12-21

## 2022-12-21 ENCOUNTER — TELEPHONE (OUTPATIENT)
Dept: UROLOGY | Facility: CLINIC | Age: 62
End: 2022-12-21

## 2022-12-21 VITALS — WEIGHT: 220.68 LBS | BODY MASS INDEX: 30.9 KG/M2 | HEIGHT: 71 IN

## 2022-12-21 RX ORDER — FAMOTIDINE 10 MG/ML
20 INJECTION, SOLUTION INTRAVENOUS ONCE
Status: CANCELLED | OUTPATIENT
Start: 2022-12-21 | End: 2022-12-21

## 2022-12-21 NOTE — TELEPHONE ENCOUNTER
Patient came in today and said he has been experiencing a lot of pain since his last procedure.He has been taking the medication you prescribed. He was wanting to know if you could call him in anymore medication. He is scheduled for another procedure tomorrow.

## 2022-12-21 NOTE — PAT
An arrival time for procedure was not provided during PAT visit. If patient had any questions or concerns about their arrival time, they were instructed to contact their surgeon/physician.  Additionally, if the patient referred to an arrival time that was acquired from their my chart account, patient was encouraged to verify that time with their surgeon/physician. Arrival times are NOT provided in Pre Admission Testing Department.    Patient to apply Chlorhexadine wipes  to surgical area (as instructed) the night before procedure and the AM of procedure. Wipes provided.    Patient instructed to drink 20 ounces of Gatorade and it needs to be completed 1 hour (for Main OR patients) or 2 hours (scheduled  section & [facility] patients) before given arrival time for procedure (NO RED Gatorade)    Patient verbalized understanding.    Patient denies any current skin issues.     Patient viewed general PAT education video as instructed in their preoperative information received from their surgeon.  Patient stated the general PAT education video was viewed in its entirety and survey completed.  Copies of PAT general education handouts (Incentive Spirometry, Meds to Beds Program, Patient Belongings, Pre-op skin preparation instructions, Blood Glucose testing, Visitor policy, Surgery FAQ, Code H) distributed to patient if not printed. Education related to the PAT pass and skin preparation for surgery (if applicable) completed in PAT as a reinforcement to PAT education video. Patient instructed to return PAT pass provided today as well as completed skin preparation sheet (if applicable) on the day of procedure.     Additionally if patient had not viewed video yet but intended to view it at home or in our waiting area, then referred them to the handout with QR code/link provided during PAT visit.  Instructed patient to complete survey after viewing the video in its entirety.  Encouraged patient/family to read [facility] general  education handouts thoroughly and notify PAT staff with any questions or concerns. Patient verbalized understanding of all information and priority content.    PATIENT EKG AND LABS ON CHART AND IN Epic FROM 12/16/2022

## 2022-12-22 ENCOUNTER — ANESTHESIA (OUTPATIENT)
Dept: PERIOP | Facility: HOSPITAL | Age: 62
End: 2022-12-22

## 2022-12-22 ENCOUNTER — HOSPITAL ENCOUNTER (OUTPATIENT)
Facility: HOSPITAL | Age: 62
Setting detail: HOSPITAL OUTPATIENT SURGERY
Discharge: HOME | End: 2022-12-22
Attending: UROLOGY | Admitting: UROLOGY

## 2022-12-22 ENCOUNTER — APPOINTMENT (OUTPATIENT)
Dept: GENERAL RADIOLOGY | Facility: HOSPITAL | Age: 62
End: 2022-12-22

## 2022-12-22 VITALS
RESPIRATION RATE: 16 BRPM | DIASTOLIC BLOOD PRESSURE: 84 MMHG | TEMPERATURE: 98.5 F | OXYGEN SATURATION: 96 % | SYSTOLIC BLOOD PRESSURE: 120 MMHG | HEART RATE: 69 BPM

## 2022-12-22 DIAGNOSIS — N20.0 RENAL STONE: ICD-10-CM

## 2022-12-22 DIAGNOSIS — R31.0 GROSS HEMATURIA: Primary | ICD-10-CM

## 2022-12-22 PROCEDURE — 25010000002 IOPAMIDOL 61 % SOLUTION: Performed by: UROLOGY

## 2022-12-22 PROCEDURE — C1769 GUIDE WIRE: HCPCS | Performed by: UROLOGY

## 2022-12-22 PROCEDURE — 25010000002 PROPOFOL 10 MG/ML EMULSION: Performed by: NURSE ANESTHETIST, CERTIFIED REGISTERED

## 2022-12-22 PROCEDURE — 25010000002 NEOSTIGMINE 10 MG/10ML SOLUTION: Performed by: NURSE ANESTHETIST, CERTIFIED REGISTERED

## 2022-12-22 PROCEDURE — 52356 CYSTO/URETERO W/LITHOTRIPSY: CPT | Performed by: UROLOGY

## 2022-12-22 PROCEDURE — C1758 CATHETER, URETERAL: HCPCS | Performed by: UROLOGY

## 2022-12-22 PROCEDURE — 25010000002 FENTANYL CITRATE (PF) 100 MCG/2ML SOLUTION: Performed by: NURSE ANESTHETIST, CERTIFIED REGISTERED

## 2022-12-22 PROCEDURE — 25010000002 ONDANSETRON PER 1 MG: Performed by: NURSE ANESTHETIST, CERTIFIED REGISTERED

## 2022-12-22 PROCEDURE — 25010000002 CEFAZOLIN IN DEXTROSE 2-4 GM/100ML-% SOLUTION: Performed by: UROLOGY

## 2022-12-22 PROCEDURE — C1894 INTRO/SHEATH, NON-LASER: HCPCS | Performed by: UROLOGY

## 2022-12-22 PROCEDURE — C2617 STENT, NON-COR, TEM W/O DEL: HCPCS | Performed by: UROLOGY

## 2022-12-22 PROCEDURE — 25010000002 DEXAMETHASONE PER 1 MG: Performed by: NURSE ANESTHETIST, CERTIFIED REGISTERED

## 2022-12-22 PROCEDURE — 74420 UROGRAPHY RTRGR +-KUB: CPT | Performed by: UROLOGY

## 2022-12-22 PROCEDURE — 25010000002 KETOROLAC TROMETHAMINE PER 15 MG: Performed by: NURSE ANESTHETIST, CERTIFIED REGISTERED

## 2022-12-22 PROCEDURE — 76000 FLUOROSCOPY <1 HR PHYS/QHP: CPT

## 2022-12-22 DEVICE — URETERAL STENT
Type: IMPLANTABLE DEVICE | Site: URETER | Status: FUNCTIONAL
Brand: PERCUFLEX™ PLUS

## 2022-12-22 RX ORDER — CEFAZOLIN SODIUM 2 G/100ML
2 INJECTION, SOLUTION INTRAVENOUS ONCE
Status: COMPLETED | OUTPATIENT
Start: 2022-12-22 | End: 2022-12-22

## 2022-12-22 RX ORDER — SODIUM CHLORIDE, SODIUM LACTATE, POTASSIUM CHLORIDE, CALCIUM CHLORIDE 600; 310; 30; 20 MG/100ML; MG/100ML; MG/100ML; MG/100ML
9 INJECTION, SOLUTION INTRAVENOUS CONTINUOUS
Status: DISCONTINUED | OUTPATIENT
Start: 2022-12-22 | End: 2022-12-22 | Stop reason: HOSPADM

## 2022-12-22 RX ORDER — SCOLOPAMINE TRANSDERMAL SYSTEM 1 MG/1
1 PATCH, EXTENDED RELEASE TRANSDERMAL CONTINUOUS
Status: DISCONTINUED | OUTPATIENT
Start: 2022-12-22 | End: 2022-12-22 | Stop reason: HOSPADM

## 2022-12-22 RX ORDER — PHENAZOPYRIDINE HYDROCHLORIDE 200 MG/1
200 TABLET, FILM COATED ORAL 3 TIMES DAILY PRN
Qty: 20 TABLET | Refills: 0 | Status: SHIPPED | OUTPATIENT
Start: 2022-12-22

## 2022-12-22 RX ORDER — TAMSULOSIN HYDROCHLORIDE 0.4 MG/1
1 CAPSULE ORAL DAILY
Qty: 14 CAPSULE | Refills: 0 | Status: SHIPPED | OUTPATIENT
Start: 2022-12-22 | End: 2023-01-05

## 2022-12-22 RX ORDER — PROPOFOL 10 MG/ML
VIAL (ML) INTRAVENOUS AS NEEDED
Status: DISCONTINUED | OUTPATIENT
Start: 2022-12-22 | End: 2022-12-22 | Stop reason: SURG

## 2022-12-22 RX ORDER — ONDANSETRON 2 MG/ML
INJECTION INTRAMUSCULAR; INTRAVENOUS AS NEEDED
Status: DISCONTINUED | OUTPATIENT
Start: 2022-12-22 | End: 2022-12-22 | Stop reason: SURG

## 2022-12-22 RX ORDER — ACETAMINOPHEN 500 MG
1000 TABLET ORAL ONCE
Status: COMPLETED | OUTPATIENT
Start: 2022-12-22 | End: 2022-12-22

## 2022-12-22 RX ORDER — LIDOCAINE HYDROCHLORIDE 10 MG/ML
0.5 INJECTION, SOLUTION EPIDURAL; INFILTRATION; INTRACAUDAL; PERINEURAL ONCE AS NEEDED
Status: COMPLETED | OUTPATIENT
Start: 2022-12-22 | End: 2022-12-22

## 2022-12-22 RX ORDER — HYDROCODONE BITARTRATE AND ACETAMINOPHEN 5; 325 MG/1; MG/1
1 TABLET ORAL EVERY 6 HOURS PRN
Qty: 12 TABLET | Refills: 0 | Status: SHIPPED | OUTPATIENT
Start: 2022-12-22

## 2022-12-22 RX ORDER — DEXAMETHASONE SODIUM PHOSPHATE 4 MG/ML
INJECTION, SOLUTION INTRA-ARTICULAR; INTRALESIONAL; INTRAMUSCULAR; INTRAVENOUS; SOFT TISSUE AS NEEDED
Status: DISCONTINUED | OUTPATIENT
Start: 2022-12-22 | End: 2022-12-22 | Stop reason: SURG

## 2022-12-22 RX ORDER — NEOSTIGMINE METHYLSULFATE 1 MG/ML
INJECTION, SOLUTION INTRAVENOUS AS NEEDED
Status: DISCONTINUED | OUTPATIENT
Start: 2022-12-22 | End: 2022-12-22 | Stop reason: SURG

## 2022-12-22 RX ORDER — SODIUM CHLORIDE 0.9 % (FLUSH) 0.9 %
10 SYRINGE (ML) INJECTION EVERY 12 HOURS SCHEDULED
Status: DISCONTINUED | OUTPATIENT
Start: 2022-12-22 | End: 2022-12-22 | Stop reason: HOSPADM

## 2022-12-22 RX ORDER — MAGNESIUM HYDROXIDE 1200 MG/15ML
LIQUID ORAL AS NEEDED
Status: DISCONTINUED | OUTPATIENT
Start: 2022-12-22 | End: 2022-12-22 | Stop reason: HOSPADM

## 2022-12-22 RX ORDER — FENTANYL CITRATE 50 UG/ML
INJECTION, SOLUTION INTRAMUSCULAR; INTRAVENOUS AS NEEDED
Status: DISCONTINUED | OUTPATIENT
Start: 2022-12-22 | End: 2022-12-22 | Stop reason: SURG

## 2022-12-22 RX ORDER — NITROFURANTOIN 25; 75 MG/1; MG/1
100 CAPSULE ORAL 2 TIMES DAILY
Qty: 14 CAPSULE | Refills: 0 | Status: SHIPPED | OUTPATIENT
Start: 2022-12-22

## 2022-12-22 RX ORDER — FENTANYL CITRATE 50 UG/ML
50 INJECTION, SOLUTION INTRAMUSCULAR; INTRAVENOUS
Status: DISCONTINUED | OUTPATIENT
Start: 2022-12-22 | End: 2022-12-22 | Stop reason: HOSPADM

## 2022-12-22 RX ORDER — ONDANSETRON 2 MG/ML
4 INJECTION INTRAMUSCULAR; INTRAVENOUS ONCE AS NEEDED
Status: DISCONTINUED | OUTPATIENT
Start: 2022-12-22 | End: 2022-12-22 | Stop reason: HOSPADM

## 2022-12-22 RX ORDER — GABAPENTIN 300 MG/1
600 CAPSULE ORAL ONCE
Status: COMPLETED | OUTPATIENT
Start: 2022-12-22 | End: 2022-12-22

## 2022-12-22 RX ORDER — SODIUM CHLORIDE 0.9 % (FLUSH) 0.9 %
10 SYRINGE (ML) INJECTION AS NEEDED
Status: DISCONTINUED | OUTPATIENT
Start: 2022-12-22 | End: 2022-12-22 | Stop reason: HOSPADM

## 2022-12-22 RX ORDER — KETOROLAC TROMETHAMINE 30 MG/ML
INJECTION, SOLUTION INTRAMUSCULAR; INTRAVENOUS AS NEEDED
Status: DISCONTINUED | OUTPATIENT
Start: 2022-12-22 | End: 2022-12-22 | Stop reason: SURG

## 2022-12-22 RX ORDER — LIDOCAINE HYDROCHLORIDE 10 MG/ML
INJECTION, SOLUTION EPIDURAL; INFILTRATION; INTRACAUDAL; PERINEURAL AS NEEDED
Status: DISCONTINUED | OUTPATIENT
Start: 2022-12-22 | End: 2022-12-22 | Stop reason: SURG

## 2022-12-22 RX ORDER — KETOROLAC TROMETHAMINE 10 MG/1
10 TABLET, FILM COATED ORAL EVERY 6 HOURS PRN
Qty: 24 TABLET | Refills: 0 | Status: SHIPPED | OUTPATIENT
Start: 2022-12-22 | End: 2022-12-30

## 2022-12-22 RX ORDER — SODIUM CHLORIDE 9 MG/ML
40 INJECTION, SOLUTION INTRAVENOUS AS NEEDED
Status: DISCONTINUED | OUTPATIENT
Start: 2022-12-22 | End: 2022-12-22 | Stop reason: HOSPADM

## 2022-12-22 RX ORDER — LIDOCAINE HYDROCHLORIDE 20 MG/ML
JELLY TOPICAL AS NEEDED
Status: DISCONTINUED | OUTPATIENT
Start: 2022-12-22 | End: 2022-12-22 | Stop reason: HOSPADM

## 2022-12-22 RX ORDER — SODIUM CHLORIDE, SODIUM LACTATE, POTASSIUM CHLORIDE, CALCIUM CHLORIDE 600; 310; 30; 20 MG/100ML; MG/100ML; MG/100ML; MG/100ML
INJECTION, SOLUTION INTRAVENOUS CONTINUOUS PRN
Status: DISCONTINUED | OUTPATIENT
Start: 2022-12-22 | End: 2022-12-22 | Stop reason: SURG

## 2022-12-22 RX ORDER — GLYCOPYRROLATE 0.2 MG/ML
INJECTION INTRAMUSCULAR; INTRAVENOUS AS NEEDED
Status: DISCONTINUED | OUTPATIENT
Start: 2022-12-22 | End: 2022-12-22 | Stop reason: SURG

## 2022-12-22 RX ORDER — ROCURONIUM BROMIDE 10 MG/ML
INJECTION, SOLUTION INTRAVENOUS AS NEEDED
Status: DISCONTINUED | OUTPATIENT
Start: 2022-12-22 | End: 2022-12-22 | Stop reason: SURG

## 2022-12-22 RX ORDER — FAMOTIDINE 20 MG/1
20 TABLET, FILM COATED ORAL ONCE
Status: COMPLETED | OUTPATIENT
Start: 2022-12-22 | End: 2022-12-22

## 2022-12-22 RX ADMIN — DEXAMETHASONE SODIUM PHOSPHATE 8 MG: 4 INJECTION, SOLUTION INTRAMUSCULAR; INTRAVENOUS at 07:52

## 2022-12-22 RX ADMIN — GLYCOPYRROLATE 0.6 MCG: 0.2 INJECTION INTRAMUSCULAR; INTRAVENOUS at 09:06

## 2022-12-22 RX ADMIN — FAMOTIDINE 20 MG: 20 TABLET, FILM COATED ORAL at 07:09

## 2022-12-22 RX ADMIN — LIDOCAINE HYDROCHLORIDE 50 MG: 10 INJECTION, SOLUTION EPIDURAL; INFILTRATION; INTRACAUDAL; PERINEURAL at 07:34

## 2022-12-22 RX ADMIN — NEOSTIGMINE METHYLSULFATE 4 MG: 0.5 INJECTION INTRAVENOUS at 09:06

## 2022-12-22 RX ADMIN — SODIUM CHLORIDE, POTASSIUM CHLORIDE, SODIUM LACTATE AND CALCIUM CHLORIDE 9 ML/HR: 600; 310; 30; 20 INJECTION, SOLUTION INTRAVENOUS at 06:55

## 2022-12-22 RX ADMIN — ONDANSETRON 4 MG: 2 INJECTION INTRAMUSCULAR; INTRAVENOUS at 07:52

## 2022-12-22 RX ADMIN — SODIUM CHLORIDE, POTASSIUM CHLORIDE, SODIUM LACTATE AND CALCIUM CHLORIDE: 600; 310; 30; 20 INJECTION, SOLUTION INTRAVENOUS at 07:34

## 2022-12-22 RX ADMIN — PROPOFOL 250 MG: 10 INJECTION, EMULSION INTRAVENOUS at 07:34

## 2022-12-22 RX ADMIN — ACETAMINOPHEN 1000 MG: 500 TABLET ORAL at 07:09

## 2022-12-22 RX ADMIN — LIDOCAINE HYDROCHLORIDE 0.2 ML: 10 INJECTION, SOLUTION EPIDURAL; INFILTRATION; INTRACAUDAL; PERINEURAL at 06:55

## 2022-12-22 RX ADMIN — ROCURONIUM BROMIDE 50 MG: 10 INJECTION, SOLUTION INTRAVENOUS at 07:34

## 2022-12-22 RX ADMIN — SCOPALAMINE 1 PATCH: 1 PATCH, EXTENDED RELEASE TRANSDERMAL at 07:10

## 2022-12-22 RX ADMIN — GABAPENTIN 600 MG: 300 CAPSULE ORAL at 07:09

## 2022-12-22 RX ADMIN — KETOROLAC TROMETHAMINE 30 MG: 30 INJECTION, SOLUTION INTRAMUSCULAR; INTRAVENOUS at 09:06

## 2022-12-22 RX ADMIN — FENTANYL CITRATE 100 MCG: 50 INJECTION, SOLUTION INTRAMUSCULAR; INTRAVENOUS at 07:34

## 2022-12-22 RX ADMIN — CEFAZOLIN SODIUM 2 G: 2 INJECTION, SOLUTION INTRAVENOUS at 07:44

## 2022-12-22 NOTE — H&P
Pre-Op H&P  Kennedy Griffith  [id number]  1960    Chief complaint: hematuria    HPI:  Patient is a 62 y.o.male who presents with a known renal stone in the right renal pelvis. He had a stent placed on 12/16 by Dr. Christine to allow passive dilation. He is returning for the second stage of his procedure. He is scheduled for a ureteroscopy with laser lithotripsy and stent on the right side. He does not take blood thinners routinely.     Review of Systems:  General ROS: negative for chills, fever or skin lesions.  Cardiovascular ROS: no chest pain or dyspnea on exertion  Respiratory ROS: no cough, shortness of breath, or wheezing    Allergies: No Known Allergies    Home Meds:    No current facility-administered medications on file prior to encounter.     Current Outpatient Medications on File Prior to Encounter   Medication Sig Dispense Refill   • Adderall XR 10 MG 24 hr capsule TAKE 1 ORAL CAPSULE EVERY MORNING WITH XR 30 MG     • Adderall XR 30 MG 24 hr capsule Take 30 mg by mouth Daily     • amphetamine-dextroamphetamine (ADDERALL) 20 MG tablet Take 20 mg by mouth Daily. In the afternoon     • busPIRone (BUSPAR) 15 MG tablet Take 1 tablet by mouth 3 (Three) Times a Day. 270 tablet 1   • DULoxetine (CYMBALTA) 60 MG capsule TAKE 1 CAPSULE BY MOUTH EVERY DAY 90 capsule 0   • ibuprofen (ADVIL,MOTRIN) 800 MG tablet TAKE 1 TABLET BY MOUTH EVERY 8 (EIGHT) HOURS AS NEEDED FOR MILD PAIN . 90 tablet 2   • nitrofurantoin, macrocrystal-monohydrate, (Macrobid) 100 MG capsule Take 1 capsule by mouth 2 (Two) Times a Day. (Patient taking differently: Take 100 mg by mouth 2 (Two) Times a Day. FOR CURRENT DIAGNOSIS/EPISODE) 14 capsule 0   • oxybutynin XL (DITROPAN-XL) 5 MG 24 hr tablet Take 1 tablet by mouth Daily. PRN BLADDER SPASM 14 tablet 0   • phenazopyridine (Pyridium) 200 MG tablet Take 1 tablet by mouth 3 (Three) Times a Day As Needed for Bladder Spasms. 20 tablet 0   • rosuvastatin (CRESTOR) 10 MG tablet TAKE 1 TABLET  BY MOUTH EVERY DAY 90 tablet 0   • tamsulosin (FLOMAX) 0.4 MG capsule 24 hr capsule Take 1 capsule by mouth Daily for 14 days. 14 capsule 0       PMH:   Past Medical History:   Diagnosis Date   • Depression    • Fatigue     chronic   • History of kidney stones    • Hyperlipidemia    • Sleepiness    • Tension headache      PSH:    Past Surgical History:   Procedure Laterality Date   • LIVER BIOPSY      benign   • NO PAST SURGERIES     • URETEROSCOPY LASER LITHOTRIPSY WITH STENT INSERTION Right 12/16/2022    Procedure: CYSTOSCOPY, RIGHT URETEROSCOPY RETROGRADE WITH STENT INSERTION;  Surgeon: Tej Christine MD;  Location: [facility];  Service: Urology;  Laterality: Right;       Immunization History:  Influenza: UTD  Pneumococcal: UTD  Tetanus: due    Social History:   Tobacco:   Social History     Tobacco Use   Smoking Status Never   Smokeless Tobacco Never      Alcohol:     Social History     Substance and Sexual Activity   Alcohol Use Yes    Comment: on rare occasion       Vitals:           Vitals  Can be found in the MAR  Physical Exam:  General Appearance:    Alert, cooperative, uncomfortable, appears stated age   Head:    Normocephalic, without obvious abnormality, atraumatic   Lungs:     Clear to auscultation bilaterally, respirations unlabored    Heart:   Regular rate and rhythm, no murmur, rub or gallop    Abdomen:    Soft, nontender. No rigidity or guarding.    Breast Exam:    deferred   Genitalia:    deferred   Extremities:   Extremities normal, atraumatic, no cyanosis or edema   Skin:   Skin color, texture, turgor normal, no rashes or lesions   Neurologic:   Grossly intact   Results Review  LABS:  Lab Results   Component Value Date    WBC 6.19 12/16/2022    HGB 15.8 12/16/2022    HCT 45.8 12/16/2022    MCV 84.3 12/16/2022     12/16/2022    NEUTROABS 4.21 08/04/2022    GLUCOSE 111 (H) 12/16/2022    BUN 15 12/16/2022    CREATININE 0.83 12/16/2022    EGFRIFNONA 71 01/14/2022    EGFRIFAFRI 86 01/14/2022      12/16/2022    K 3.8 12/16/2022     12/16/2022    CO2 24.0 12/16/2022    CALCIUM 8.9 12/16/2022    ALBUMIN 4.40 08/04/2022    AST 31 08/04/2022    ALT 44 (H) 08/04/2022    BILITOT 0.5 08/04/2022       RADIOLOGY:  No radiology results for the last 3 days     I reviewed the patient's new imaging results and agree with the interpretation.      Impression: renal stone    Plan: ureteroscopy with laser lithotripsy and stent on the right side    Wally Escobar PA-C   12/22/2022   06:49 EST    Color consistent with ethnicity/race, warm, dry intact, resilient.

## 2022-12-22 NOTE — ANESTHESIA PREPROCEDURE EVALUATION
Anesthesia Evaluation     Patient summary reviewed and Nursing notes reviewed   NPO Solid Status: > 8 hours  NPO Liquid Status: > 2 hours           Airway   Mallampati: III  TM distance: >3 FB  Neck ROM: full  Possible difficult intubation  Dental      Pulmonary    (+) sleep apnea (No Rx),   (-) shortness of breath, recent URI, not a smoker  Cardiovascular     ECG reviewed    (+) hyperlipidemia,   (-) past MI, dysrhythmias, angina, cardiac stents    ROS comment: ECXG NSR LAD  ECHO 2022 EF 60% valves OK     Neuro/Psych  (-) seizures, CVA  GI/Hepatic/Renal/Endo    (+) obesity,   liver disease fatty liver disease, renal disease,   (-) diabetes, no thyroid disorder    Musculoskeletal     Abdominal    Substance History      OB/GYN          Other            Phys Exam Other: Krueger 2 grade IIb SCRNA?                Anesthesia Plan    ASA 2     general     (Murillo if ETT ( sore throat after GA last week))  intravenous induction     Anesthetic plan, risks, benefits, and alternatives have been provided, discussed and informed consent has been obtained with: patient.    Plan discussed with CRNA.        CODE STATUS:

## 2022-12-22 NOTE — ANESTHESIA PROCEDURE NOTES
Airway  Urgency: elective    Date/Time: 12/22/2022 7:43 AM  Airway not difficult    General Information and Staff    Patient location during procedure: OR    Indications and Patient Condition  Indications for airway management: airway protection    Preoxygenated: yes  MILS not maintained throughout  Mask difficulty assessment: 1 - vent by mask    Final Airway Details  Final airway type: endotracheal airway      Successful airway: ETT  Cuffed: yes   Successful intubation technique: direct laryngoscopy and video laryngoscopy  Endotracheal tube insertion site: oral  Blade: Murillo  Blade size: 4  ETT size (mm): 7.5  Cormack-Lehane Classification: grade I - full view of glottis  Placement verified by: chest auscultation and capnometry   Measured from: lips  ETT/EBT  to lips (cm): 23  Number of attempts at approach: 1  Assessment: lips, teeth, and gum same as pre-op and atraumatic intubation    Additional Comments  Negative epigastric sounds, Breath sound equal bilaterally with symmetric chest rise and fall

## 2022-12-22 NOTE — ANESTHESIA POSTPROCEDURE EVALUATION
Patient: Kennedy Griffith    Procedure Summary     Date: 12/22/22 Room / Location:  [facility]    Anesthesia Start: 0734 Anesthesia Stop: 0930    Procedure: URETEROSCOPY LASER LITHOTRIPSY WITH STENT INSERTION RIGHT (Right) Diagnosis:       Renal stone      (Renal stone [N20.0])    Surgeons: Tej Christine MD Provider: Antonio Emery MD    Anesthesia Type: general ASA Status: 2          Anesthesia Type: general    Vitals  Vitals Value Taken Time   /85 12/22/22 0927   Temp     Pulse 89 12/22/22 0929   Resp     SpO2 90 % 12/22/22 0929   Vitals shown include unvalidated device data.        Post Anesthesia Care and Evaluation    Patient location during evaluation: PACU  Patient participation: complete - patient participated  Level of consciousness: sleepy but conscious and responsive to light touch  Pain management: adequate    Airway patency: patent  Anesthetic complications: No anesthetic complications  PONV Status: none  Cardiovascular status: hemodynamically stable and acceptable  Respiratory status: nonlabored ventilation, acceptable, nasal cannula and oral airway  Hydration status: acceptable

## 2022-12-22 NOTE — OP NOTE
URETEROSCOPY LASER LITHOTRIPSY WITH STENT INSERTION  Procedure Report    Patient Name:  Kennedy Griffith  YOB: 1960    Date of Surgery:  12/22/2022     Indications: 62-year-old male presenting for definitive stone treatment, known right renal pelvis stone.  He has previously undergone cystoscopy and ureteral stent placement.  He presents today for ureteroscopy and laser lithotripsy, risks and benefits were discussed with the patient he elects to proceed    Pre-op Diagnosis:   Renal stone [N20.0]       Post-Op Diagnosis Codes:     * Renal stone [N20.0]        Procedure(s):  CYSTOSCOPY  RIGHT RETROGRADE PYELOGRAM < 1 HR  RIGHT URETERAL DILATION   RIGHT URETEROSCOPY, LASER LITHOTRIPSY   RIGHT URETERAL STENT INSERTION     Staff:  Surgeon(s):  Tej Christine MD         Anesthesia: General    Estimated Blood Loss: none    Implants:    Implant Name Type Inv. Item Serial No.  Lot No. LRB No. Used Action   STNT PERCUFLX NO [id number] - [id number] Stent STNT PERCUFLX NO GW [id number]  [facility] JOHAN [id number] Right 1 Implanted       Specimen:          None        Findings:   1.  Normal urinary bladder without evidence of tumor stone or foreign body.  Prior ureteral stent removed intact  2.  Semirigid ureteroscopy with clearance of distal mid and proximal ureter.  Narrowing again demonstrated within the mid ureter.  Semirigid ureteroscope able to be passed between 2 sensor wire safely  3.  Difficulty with passage of flexible ureteroscope at area of narrowing.  Ureteral dilation was required to be performed.  Subsequently flexible ureteroscope was able to be passed into the renal pelvis under fluoroscopic guidance.  Laser lithotripsy performed with fragmentation and dusting of stone  4.  Retrograde pyelography with dilated right renal collecting system noted.  5.  Successful placement of 6 [place] by 26 cm double-J ureteral stent without string    Complications: None immediate    Description of  Procedure:     After informed consent, the patient was brought back to the operating suite and moved over to the operating table. General anesthesia was smoothly induced, IV antibiotics were administered, and the patient was placed in the dorsal lithotomy position with careful attention focused on padding all pressure points. The patient was prepped and draped in standard fashion. A timeout was performed to ensure the correct patient and procedure    A 22Fr cystoscope was used to cannulate the urethra. The urethra was of normal course and caliber.  Upon entering the bladder, pan-cystoscopy revealed no bladder abnormalities.  The bilateral ureteral orifices were visualized in their orthotopic positions. Attention was then turned to the right ureteral orifice which was cannulated with a sensor wire was advanced up the right ureter and into the collecting system on fluoroscopy to serve as a safety wire.  Flexible stent grasper was passed and indwelling stent removed intact.    SEMIRIGID URETEROSCOPY  The semi-rigid ureteroscope was then advanced into the bladder. The right ureter was cannulated demonstrating narrowing in the distal/mid ureter..  A second sensor wire was advanced into the kidney through the scope and the scope was backed out.     RETROGRADE PYELOGRAM  A dual-lumen was placed over second wire.  Contrast was instilled demonstrating narrowing within the distal/mid ureter, contrast seen filling the renal collecting system.      NO ACCESS SHEATH  We then switched to pressure-bag irrigation and a flexible ureteroscope was advanced over the second guidewire into the distal ureter.  There was difficulty in scope passage at the level of the mid ureter.  A Super Stiff wire was placed through the scope and up to the level the renal pelvis under fluoroscopic guidance.  The scope was backloaded.  An 8/10 dilator was passed sequentially.  The scope was then reattempted with difficulty.  Dual-lumen was then passed  without difficulty at the level of the mid ureter.  Contrast again instilled to evaluate area of narrowing.  8/10 dilator was then reused to dilate this area of narrowing.  Ultimately the scope was then able to be advanced to the level of the renal pelvis under live fluoroscopy and the second guidewire was removed. Pan-pyeloscopy was then performed which revealed 7 mm stone in the interpolar region. A 200 micron Thulium laser fiber was then advanced through the ureteroscope. The stones were fragmented into tiny stone dust particles. Pan pyeloscopy was then performed which confirmed no significant stone fragments larger than 2 mm. The ureter was inspected as the flexible ureteroscope was removed and found to be free of any injuries or stone.    CYSTO PLACEMENT  We switched back to the rigid cystoscope which was reinserted over the existing guidewire. A 6 F x 20 cm double J ureteral stent was advanced up the right ureter under direct visualization which confirmed good curl in the bladder. Fluoroscopy confirmed good curl in the kidney. The bladder was drained and this concluded our procedure.        The patient was brought back to the PACU in stable condition. All scopes and instruments were in good working order at the end of the case. There were no complications.      Disposition:  The patient may discharge remaining appropriate PACU criteria.  The patient will follow-up in 5 to 7 days for office-based stent removal.  He was instructed that ureteral stents are not permanent he must follow-up for removal.  He will be contacted by office with appointment date and time.          Tej Christine MD     Date: 12/22/2022  Time: 17:59 EST

## 2022-12-29 ENCOUNTER — PROCEDURE VISIT (OUTPATIENT)
Dept: UROLOGY | Facility: CLINIC | Age: 62
End: 2022-12-29

## 2022-12-29 VITALS — WEIGHT: 220 LBS | HEIGHT: 71 IN | BODY MASS INDEX: 30.8 KG/M2

## 2022-12-29 DIAGNOSIS — N20.0 NEPHROLITHIASIS: Primary | ICD-10-CM

## 2022-12-29 PROCEDURE — 52310 CYSTOSCOPY AND TREATMENT: CPT | Performed by: UROLOGY

## 2022-12-29 NOTE — PROGRESS NOTES
Procedure: Flexible Cystoscopy with Stent Removal     Preoperative Diagnosis: Right renal stone    Postoperative Diagnosis: Right renal stone    Procedure performed: Cystoscopy with right ureteral stent removal     Surgeon: Tej Christine MD    Anesthesia: 2% Lidocaine Jelly    Indications: Kennedy Griffith is a 62 y.o. year old male with a history of right renal stone who underwent definitive stone treatment. A ureteral stent was left in place. The patient returns for planned ureteral stent removal today.     Procedure: Patient was taken to the urology procedure suite and prepped and draped in sterile fashion. A pre-procedural identification was performed. 10 cc of 2% lidocaine jelly was injected into the urethra. After adequate anesthesia, a lubricated flexible cystoscope was inserted into the urethra. The urethra appeared normal. The urinary sphincter was intact. The bladder was entered and 360 degree panendoscopy was performed revealing normal bladder anatomy, bilateral orthotopic ureteral orifices, and no concern for bladder stones, trabeculations, mucosal lesions/tumors, or divetrticuli. The right ureteral stent was in good position emanating from the ureteral orifice.      The right ureteral stent was grasped with a pair of grasping forceps and was removed without difficulty. The stent was removed intact.     PLAN    1) Discharge home    2) Follow up: 4 weeks with ultrasound

## 2023-01-09 ENCOUNTER — TELEPHONE (OUTPATIENT)
Dept: UROLOGY | Facility: CLINIC | Age: 63
End: 2023-01-09
Payer: COMMERCIAL

## 2023-01-09 NOTE — TELEPHONE ENCOUNTER
Called to let PT know that [employer] is no longerer accepted by [facility]. Asked PT to return call to office about upcoming att. 02/01/23 and if PTwishes to continue care, would need a continuity of care form at the next visit.

## 2023-01-20 DIAGNOSIS — E78.2 MIXED HYPERLIPIDEMIA: ICD-10-CM

## 2023-01-20 RX ORDER — ROSUVASTATIN CALCIUM 10 MG/1
TABLET, COATED ORAL
Qty: 90 TABLET | Refills: 0 | OUTPATIENT
Start: 2023-01-20

## 2023-05-09 ENCOUNTER — OFFICE VISIT (OUTPATIENT)
Dept: INTERNAL MEDICINE | Facility: CLINIC | Age: 63
End: 2023-05-09
Payer: COMMERCIAL

## 2023-05-09 VITALS
OXYGEN SATURATION: 96 % | WEIGHT: 217 LBS | HEART RATE: 76 BPM | SYSTOLIC BLOOD PRESSURE: 126 MMHG | DIASTOLIC BLOOD PRESSURE: 64 MMHG | HEIGHT: 71 IN | BODY MASS INDEX: 30.38 KG/M2 | TEMPERATURE: 96.9 F

## 2023-05-09 DIAGNOSIS — R09.82 PND (POST-NASAL DRIP): ICD-10-CM

## 2023-05-09 DIAGNOSIS — J02.9 SORE THROAT: Primary | ICD-10-CM

## 2023-05-09 DIAGNOSIS — J30.9 ALLERGIC RHINITIS, UNSPECIFIED SEASONALITY, UNSPECIFIED TRIGGER: ICD-10-CM

## 2023-05-09 DIAGNOSIS — E55.9 VITAMIN D DEFICIENCY: ICD-10-CM

## 2023-05-09 DIAGNOSIS — R05.1 ACUTE COUGH: ICD-10-CM

## 2023-05-09 DIAGNOSIS — K21.9 GASTROESOPHAGEAL REFLUX DISEASE, UNSPECIFIED WHETHER ESOPHAGITIS PRESENT: ICD-10-CM

## 2023-05-09 DIAGNOSIS — E78.2 MIXED HYPERLIPIDEMIA: ICD-10-CM

## 2023-05-09 LAB
EXPIRATION DATE: NORMAL
INTERNAL CONTROL: NORMAL
Lab: NORMAL
S PYO AG THROAT QL: NEGATIVE

## 2023-05-09 PROCEDURE — 87880 STREP A ASSAY W/OPTIC: CPT | Performed by: NURSE PRACTITIONER

## 2023-05-09 PROCEDURE — 99214 OFFICE O/P EST MOD 30 MIN: CPT | Performed by: NURSE PRACTITIONER

## 2023-05-09 RX ORDER — MODAFINIL 200 MG/1
200 TABLET ORAL EVERY MORNING
COMMUNITY
Start: 2023-04-05

## 2023-05-09 RX ORDER — FLUTICASONE PROPIONATE 50 MCG
2 SPRAY, SUSPENSION (ML) NASAL DAILY
Qty: 16 G | Refills: 2 | Status: SHIPPED | OUTPATIENT
Start: 2023-05-09

## 2023-05-09 RX ORDER — PROPRANOLOL HYDROCHLORIDE 10 MG/1
10 TABLET ORAL 3 TIMES DAILY PRN
COMMUNITY
Start: 2023-04-20

## 2023-05-09 RX ORDER — ROSUVASTATIN CALCIUM 10 MG/1
10 TABLET, COATED ORAL DAILY
Qty: 90 TABLET | Refills: 1 | Status: SHIPPED | OUTPATIENT
Start: 2023-05-09

## 2023-05-09 RX ORDER — OMEPRAZOLE 20 MG/1
20 CAPSULE, DELAYED RELEASE ORAL DAILY
Qty: 90 CAPSULE | Refills: 1 | Status: SHIPPED | OUTPATIENT
Start: 2023-05-09

## 2023-05-09 RX ORDER — FEXOFENADINE HCL 180 MG/1
180 TABLET ORAL DAILY
Qty: 90 TABLET | Refills: 3 | Status: SHIPPED | OUTPATIENT
Start: 2023-05-09

## 2023-05-09 RX ORDER — BUPROPION HYDROCHLORIDE 75 MG/1
1 TABLET ORAL DAILY
COMMUNITY
Start: 2023-03-20

## 2023-05-09 NOTE — PROGRESS NOTES
Subjective   Kennedy Griffith is a 62 y.o. male    Chief Complaint   Patient presents with   • Cough     Cough started about the beginning of April.  Coughing up clear mucus now.     • Sore Throat     Started after his procedure for kidney stones and has a sharp pain in the left tonsil.  This was the end of December.   • Hyperlipidemia     If he needs to continue the crestor will need refill.     History of Present Illness     Pt states that he was intubated in Dec twice for procedures due to renal stones.  States that since then, he has had a chronic sore throat.      Started with cold/URI sx's in early April.  Cough is productive, clear mucous.  Has been taking OTC Sudafed and Mucinex w/o relief of sx's    HL - chronic/current regimen is Crestor 10 mg daily - due for labs    The following portions of the patient's history were reviewed and updated as appropriate: allergies, current medications, past family history, past medical history, past social history, past surgical history and problem list.    Current Outpatient Medications:   •  Adderall XR 10 MG 24 hr capsule, TAKE 1 ORAL CAPSULE EVERY MORNING WITH XR 30 MG, Disp: , Rfl:   •  Adderall XR 30 MG 24 hr capsule, Take 1 capsule by mouth Daily, Disp: , Rfl:   •  amphetamine-dextroamphetamine (ADDERALL) 20 MG tablet, Take 1 tablet by mouth Daily. In the afternoon, Disp: , Rfl:   •  buPROPion (WELLBUTRIN) 75 MG tablet, Take 1 tablet by mouth Daily., Disp: , Rfl:   •  busPIRone (BUSPAR) 15 MG tablet, Take 1 tablet by mouth 3 (Three) Times a Day., Disp: 270 tablet, Rfl: 1  •  DULoxetine (CYMBALTA) 60 MG capsule, TAKE 1 CAPSULE BY MOUTH EVERY DAY, Disp: 90 capsule, Rfl: 0  •  modafinil (PROVIGIL) 200 MG tablet, Take 1 tablet by mouth Every Morning., Disp: , Rfl:   •  propranolol (INDERAL) 10 MG tablet, Take 1 tablet by mouth 3 (Three) Times a Day As Needed., Disp: , Rfl:   •  rosuvastatin (CRESTOR) 10 MG tablet, Take 1 tablet by mouth Daily., Disp: 90 tablet, Rfl:  1  •  fexofenadine (Allegra Allergy) 180 MG tablet, Take 1 tablet by mouth Daily., Disp: 90 tablet, Rfl: 3  •  fluticasone (FLONASE) 50 MCG/ACT nasal spray, 2 sprays into the nostril(s) as directed by provider Daily., Disp: 16 g, Rfl: 2  •  omeprazole (priLOSEC) 20 MG capsule, Take 1 capsule by mouth Daily., Disp: 90 capsule, Rfl: 1     Review of Systems   Constitutional: Negative for chills, fatigue and fever.   Respiratory: Negative for cough, chest tightness and shortness of breath.    Cardiovascular: Negative for chest pain.   Gastrointestinal: Negative for abdominal pain, diarrhea, nausea and vomiting.   Endocrine: Negative for cold intolerance and heat intolerance.   Musculoskeletal: Negative for arthralgias.   Neurological: Negative for dizziness.       Objective   Physical Exam  Constitutional:       Appearance: He is well-developed.   HENT:      Head: Normocephalic and atraumatic.   Eyes:      Conjunctiva/sclera: Conjunctivae normal.      Pupils: Pupils are equal, round, and reactive to light.   Cardiovascular:      Rate and Rhythm: Normal rate and regular rhythm.      Heart sounds: Normal heart sounds.   Pulmonary:      Effort: Pulmonary effort is normal.      Breath sounds: Normal breath sounds.   Abdominal:      General: Bowel sounds are normal.      Palpations: Abdomen is soft.   Musculoskeletal:         General: Normal range of motion.      Cervical back: Normal range of motion.   Skin:     General: Skin is warm and dry.   Neurological:      Mental Status: He is alert and oriented to person, place, and time.      Deep Tendon Reflexes: Reflexes are normal and symmetric.   Psychiatric:         Behavior: Behavior normal.         Thought Content: Thought content normal.         Judgment: Judgment normal.       Vitals:    05/09/23 1011   BP: 126/64   BP Location: Left arm   Patient Position: Sitting   Pulse: 76   Temp: 96.9 °F (36.1 °C)   TempSrc: Infrared   SpO2: 96%   Weight: 98.4 kg (217 lb)   Height:  "180.3 cm (70.98\")         Assessment & Plan   Diagnoses and all orders for this visit:    1. Sore throat (Primary)  Comments:  traumatic following intubation in 12/22  Orders:  -     POC Rapid Strep A  -     fluticasone (FLONASE) 50 MCG/ACT nasal spray; 2 sprays into the nostril(s) as directed by provider Daily.  Dispense: 16 g; Refill: 2  -     Ambulatory Referral to ENT (Otolaryngology)    2. Acute cough  -     omeprazole (priLOSEC) 20 MG capsule; Take 1 capsule by mouth Daily.  Dispense: 90 capsule; Refill: 1    3. PND (post-nasal drip)  -     Ambulatory Referral to ENT (Otolaryngology)    4. Allergic rhinitis, unspecified seasonality, unspecified trigger  -     fluticasone (FLONASE) 50 MCG/ACT nasal spray; 2 sprays into the nostril(s) as directed by provider Daily.  Dispense: 16 g; Refill: 2  -     fexofenadine (Allegra Allergy) 180 MG tablet; Take 1 tablet by mouth Daily.  Dispense: 90 tablet; Refill: 3  -     Ambulatory Referral to ENT (Otolaryngology)    5. Mixed hyperlipidemia  -     rosuvastatin (CRESTOR) 10 MG tablet; Take 1 tablet by mouth Daily.  Dispense: 90 tablet; Refill: 1  -     CBC & Differential; Future  -     Comprehensive Metabolic Panel; Future  -     Lipid Panel; Future  -     TSH; Future  -     Vitamin B12; Future  -     Vitamin D,25-Hydroxy; Future    6. Vitamin D deficiency  -     CBC & Differential; Future  -     Comprehensive Metabolic Panel; Future  -     Lipid Panel; Future  -     TSH; Future  -     Vitamin B12; Future  -     Vitamin D,25-Hydroxy; Future    7. Gastroesophageal reflux disease, unspecified whether esophagitis present  Comments:  silent reflux  Orders:  -     omeprazole (priLOSEC) 20 MG capsule; Take 1 capsule by mouth Daily.  Dispense: 90 capsule; Refill: 1      Rapid strep, neg  Will start Omeprazole , flonase and allegra  Referred to ENT  Will ck labs prior to PE  Return in about 6 weeks (around 6/20/2023) for Annual.             "

## 2023-11-14 DIAGNOSIS — E78.2 MIXED HYPERLIPIDEMIA: ICD-10-CM

## 2023-11-14 DIAGNOSIS — K21.9 GASTROESOPHAGEAL REFLUX DISEASE, UNSPECIFIED WHETHER ESOPHAGITIS PRESENT: ICD-10-CM

## 2023-11-14 DIAGNOSIS — R05.1 ACUTE COUGH: ICD-10-CM

## 2023-11-14 RX ORDER — OMEPRAZOLE 20 MG/1
20 CAPSULE, DELAYED RELEASE ORAL DAILY
Qty: 90 CAPSULE | Refills: 1 | OUTPATIENT
Start: 2023-11-14

## 2023-11-14 RX ORDER — ROSUVASTATIN CALCIUM 10 MG/1
10 TABLET, COATED ORAL DAILY
Qty: 90 TABLET | Refills: 1 | OUTPATIENT
Start: 2023-11-14

## 2024-02-07 ENCOUNTER — APPOINTMENT (OUTPATIENT)
Dept: GENERAL RADIOLOGY | Facility: HOSPITAL | Age: 64
End: 2024-02-07
Payer: COMMERCIAL

## 2024-02-07 ENCOUNTER — HOSPITAL ENCOUNTER (EMERGENCY)
Facility: HOSPITAL | Age: 64
Discharge: HOME | End: 2024-02-08
Attending: EMERGENCY MEDICINE
Payer: COMMERCIAL

## 2024-02-07 VITALS
OXYGEN SATURATION: 96 % | DIASTOLIC BLOOD PRESSURE: 89 MMHG | WEIGHT: 225 LBS | HEART RATE: 74 BPM | RESPIRATION RATE: 18 BRPM | HEIGHT: 71 IN | SYSTOLIC BLOOD PRESSURE: 148 MMHG | BODY MASS INDEX: 31.5 KG/M2 | TEMPERATURE: 98 F

## 2024-02-07 DIAGNOSIS — R07.9 NONSPECIFIC CHEST PAIN: Primary | ICD-10-CM

## 2024-02-07 LAB
ALBUMIN SERPL-MCNC: 4.4 G/DL (ref 3.5–5.2)
ALBUMIN/GLOB SERPL: 1.6 G/DL
ALP SERPL-CCNC: 82 U/L (ref 39–117)
ALT SERPL W P-5'-P-CCNC: 54 U/L (ref 1–41)
ANION GAP SERPL CALCULATED.3IONS-SCNC: 13 MMOL/L (ref 5–15)
AST SERPL-CCNC: 34 U/L (ref 1–40)
BASOPHILS # BLD AUTO: 0.05 10*3/MM3 (ref 0–0.2)
BASOPHILS NFR BLD AUTO: 0.7 % (ref 0–1.5)
BILIRUB SERPL-MCNC: 0.4 MG/DL (ref 0–1.2)
BUN SERPL-MCNC: 16 MG/DL (ref 8–23)
BUN/CREAT SERPL: 17.8 (ref 7–25)
CALCIUM SPEC-SCNC: 9.1 MG/DL (ref 8.6–10.5)
CHLORIDE SERPL-SCNC: 105 MMOL/L (ref 98–107)
CO2 SERPL-SCNC: 23 MMOL/L (ref 22–29)
CREAT SERPL-MCNC: 0.9 MG/DL (ref 0.76–1.27)
DEPRECATED RDW RBC AUTO: 39.5 FL (ref 37–54)
EGFRCR SERPLBLD CKD-EPI 2021: 94.8 ML/MIN/1.73
EOSINOPHIL # BLD AUTO: 0.16 10*3/MM3 (ref 0–0.4)
EOSINOPHIL NFR BLD AUTO: 2.2 % (ref 0.3–6.2)
ERYTHROCYTE [DISTWIDTH] IN BLOOD BY AUTOMATED COUNT: 12.6 % (ref 12.3–15.4)
GLOBULIN UR ELPH-MCNC: 2.8 GM/DL
GLUCOSE SERPL-MCNC: 107 MG/DL (ref 65–99)
HCT VFR BLD AUTO: 48.8 % (ref 37.5–51)
HGB BLD-MCNC: 16.7 G/DL (ref 13–17.7)
HOLD SPECIMEN: NORMAL
HOLD SPECIMEN: NORMAL
IMM GRANULOCYTES # BLD AUTO: 0.02 10*3/MM3 (ref 0–0.05)
IMM GRANULOCYTES NFR BLD AUTO: 0.3 % (ref 0–0.5)
LIPASE SERPL-CCNC: 18 U/L (ref 13–60)
LYMPHOCYTES # BLD AUTO: 2.4 10*3/MM3 (ref 0.7–3.1)
LYMPHOCYTES NFR BLD AUTO: 32.3 % (ref 19.6–45.3)
MCH RBC QN AUTO: 29.5 PG (ref 26.6–33)
MCHC RBC AUTO-ENTMCNC: 34.2 G/DL (ref 31.5–35.7)
MCV RBC AUTO: 86.2 FL (ref 79–97)
MONOCYTES # BLD AUTO: 0.75 10*3/MM3 (ref 0.1–0.9)
MONOCYTES NFR BLD AUTO: 10.1 % (ref 5–12)
NEUTROPHILS NFR BLD AUTO: 4.05 10*3/MM3 (ref 1.7–7)
NEUTROPHILS NFR BLD AUTO: 54.4 % (ref 42.7–76)
NRBC BLD AUTO-RTO: 0 /100 WBC (ref 0–0.2)
NT-PROBNP SERPL-MCNC: <36 PG/ML (ref 0–900)
PLATELET # BLD AUTO: 301 10*3/MM3 (ref 140–450)
PMV BLD AUTO: 9.1 FL (ref 6–12)
POTASSIUM SERPL-SCNC: 3.6 MMOL/L (ref 3.5–5.2)
PROT SERPL-MCNC: 7.2 G/DL (ref 6–8.5)
RBC # BLD AUTO: 5.66 10*6/MM3 (ref 4.14–5.8)
SODIUM SERPL-SCNC: 141 MMOL/L (ref 136–145)
TROPONIN T SERPL HS-MCNC: 8 NG/L
WBC NRBC COR # BLD AUTO: 7.43 10*3/MM3 (ref 3.4–10.8)
WHOLE BLOOD HOLD COAG: NORMAL
WHOLE BLOOD HOLD SPECIMEN: NORMAL

## 2024-02-07 PROCEDURE — 80053 COMPREHEN METABOLIC PANEL: CPT

## 2024-02-07 PROCEDURE — 71045 X-RAY EXAM CHEST 1 VIEW: CPT

## 2024-02-07 PROCEDURE — 93005 ELECTROCARDIOGRAM TRACING: CPT

## 2024-02-07 PROCEDURE — 83880 ASSAY OF NATRIURETIC PEPTIDE: CPT

## 2024-02-07 PROCEDURE — 85025 COMPLETE CBC W/AUTO DIFF WBC: CPT

## 2024-02-07 PROCEDURE — 84484 ASSAY OF TROPONIN QUANT: CPT

## 2024-02-07 PROCEDURE — 83690 ASSAY OF LIPASE: CPT

## 2024-02-07 PROCEDURE — 36415 COLL VENOUS BLD VENIPUNCTURE: CPT

## 2024-02-07 PROCEDURE — 99284 EMERGENCY DEPT VISIT MOD MDM: CPT

## 2024-02-07 RX ORDER — ASPIRIN 81 MG/1
324 TABLET, CHEWABLE ORAL ONCE
Status: COMPLETED | OUTPATIENT
Start: 2024-02-07 | End: 2024-02-07

## 2024-02-07 RX ORDER — SODIUM CHLORIDE 0.9 % (FLUSH) 0.9 %
10 SYRINGE (ML) INJECTION AS NEEDED
Status: DISCONTINUED | OUTPATIENT
Start: 2024-02-07 | End: 2024-02-08 | Stop reason: HOSPADM

## 2024-02-07 RX ADMIN — ASPIRIN 324 MG: 81 TABLET, CHEWABLE ORAL at 23:31

## 2024-02-08 LAB
GEN 5 2HR TROPONIN T REFLEX: 7 NG/L
HOLD SPECIMEN: NORMAL
QT INTERVAL: 376 MS
QT INTERVAL: 404 MS
QTC INTERVAL: 432 MS
QTC INTERVAL: 433 MS
TROPONIN T DELTA: -1 NG/L

## 2024-02-08 PROCEDURE — 84484 ASSAY OF TROPONIN QUANT: CPT

## 2024-02-08 PROCEDURE — 36415 COLL VENOUS BLD VENIPUNCTURE: CPT

## 2024-02-08 NOTE — ED PROVIDER NOTES
Subjective   History of Present Illness  This is a 63-year-old male with past medical history dyslipidemia presented to the emergency department with some chest discomfort.  The patient states that about 30 minutes ago he was watching some TV he got some tightness in his chest.  It is global in nature.  Nonradiating.  The patient states that it is since subsided.  He has had episodes like this before in the past.  He denies any associated shortness of breath, cough or hemoptysis.  He is not having any headache or change in vision.  No focal weakness.  No abdominal pain or vomiting.    History provided by:  Patient   used: No        Review of Systems   Constitutional:  Negative for chills and fever.   HENT:  Negative for congestion, ear pain and sore throat.    Eyes:  Negative for visual disturbance.   Respiratory:  Negative for shortness of breath.    Cardiovascular:  Positive for chest pain.   Gastrointestinal:  Negative for abdominal pain.   Genitourinary:  Negative for difficulty urinating.   Musculoskeletal:  Negative for arthralgias.   Skin:  Negative for rash.   Neurological:  Negative for dizziness, weakness and numbness.   Psychiatric/Behavioral:  Negative for agitation.        Past Medical History:   Diagnosis Date    Depression     Fatigue     chronic    History of kidney stones     Hyperlipidemia     Sleepiness     Tension headache        No Known Allergies    Past Surgical History:   Procedure Laterality Date    LIVER BIOPSY      benign    URETEROSCOPY LASER LITHOTRIPSY WITH STENT INSERTION Right 12/16/2022    Procedure: CYSTOSCOPY, RIGHT URETEROSCOPY RETROGRADE WITH STENT INSERTION;  Surgeon: Tej Christine MD;  Location: [facility] OR;  Service: Urology;  Laterality: Right;    URETEROSCOPY LASER LITHOTRIPSY WITH STENT INSERTION Right 12/22/2022    Procedure: URETEROSCOPY LASER LITHOTRIPSY WITH STENT INSERTION RIGHT;  Surgeon: Tej Christine MD;  Location: [facility] OR;  Service: Urology;   Laterality: Right;       Family History   Problem Relation Age of Onset    Other Mother         cardiac arrhythmia/pacer    Arthritis Father     No Known Problems Sister     No Known Problems Brother     Colon cancer Maternal Grandmother     No Known Problems Maternal Grandfather     Heart disease Paternal Grandmother     Coronary artery disease Paternal Grandfather     Lung cancer Paternal Grandfather     Other Daughter         PTSD/suicide    No Known Problems Son     ADD / ADHD Son        Social History     Socioeconomic History    Marital status:    Tobacco Use    Smoking status: Never    Smokeless tobacco: Never   Vaping Use    Vaping Use: Never used   Substance and Sexual Activity    Alcohol use: Yes     Comment: on rare occasion    Drug use: Never    Sexual activity: Defer     Comment:            Objective   Physical Exam  Vitals and nursing note reviewed.   Constitutional:       General: He is not in acute distress.     Appearance: He is not ill-appearing or toxic-appearing.   HENT:      Mouth/Throat:      Pharynx: No posterior oropharyngeal erythema.   Eyes:      Extraocular Movements: Extraocular movements intact.      Pupils: Pupils are equal, round, and reactive to light.   Cardiovascular:      Rate and Rhythm: Normal rate and regular rhythm.   Pulmonary:      Effort: Pulmonary effort is normal. No respiratory distress.   Abdominal:      General: Abdomen is flat. There is no distension.      Palpations: There is no mass.      Tenderness: There is no abdominal tenderness. There is no guarding or rebound.   Musculoskeletal:         General: No deformity. Normal range of motion.   Neurological:      General: No focal deficit present.      Mental Status: He is alert.      Sensory: No sensory deficit.      Motor: No weakness.         ECG 12 Lead ED Triage Standing Order; Chest Pain      Date/Time: 2/7/2024 10:42 PM    Performed by: Berny Shirley MD  Authorized by: Emergency, Triage  Protocol, MD  Interpreted by ED physician  Comparison: compared with previous ECG   Similar to previous ECG  Rhythm: sinus rhythm  Rate: normal  BPM: 80  QRS axis: left  Conduction: conduction normal  ST Segments: ST segments normal  Other: no other findings  Clinical impression: normal ECG  Comments: EKG was directly visualized by myself, interpretations as documented in hospital course.    ECG 12 Lead      Date/Time: 2/8/2024 2:06 AM    Performed by: Berny Shirley MD  Authorized by: Berny Shirley MD  Interpreted by ED physician  Comparison: compared with previous ECG   Similar to previous ECG  Rhythm: sinus rhythm  Rate: normal  BPM: 65  QRS axis: normal  Conduction: conduction normal  Other findings comments: Nonspecific ST changes  Clinical impression: non-specific ECG  Comments: EKG was directly visualized by myself, interpretations as documented in hospital course.               ED Course  ED Course as of 02/08/24 0208 Wed Feb 07, 2024 2246 BP: 146/91 [JK]   2246 Temp: 98 °F (36.7 °C) [JK]   2246 Temp src: Oral [JK]   2246 Heart Rate: 86 [JK]   2246 Resp: 18 [JK]   2246 SpO2: 96 %  Patient's repeat vitals, telemetry tracing, and pulse oximetry tracing were directly viewed and interpreted by myself.  Patient is hemodynamically stable [JK]   Thu Feb 08, 2024   0207 High Sensitivity Troponin T [JK]   0207 Lipase [JK]   0207 Comprehensive Metabolic Panel(!) [JK]   0207 High Sensitivity Troponin T 2Hr [JK]   0207 CBC & Differential  Laboratory studies were reviewed and interpreted directly by myself.  CMP was normal, CBC normal, BNP normal, lipase normal, initial troponin was 8, repeat was 7 with a delta gap of -1 [JK]   0207 XR Chest 1 View  Imaging was directly visualized by myself, per my interpretations, chest x-ray showed some right lower lobe atelectasis. [JK]   0208 On reevaluation, the patient is feeling much better.  Vital signs have improved.  Overall they are well-appearing.  There were  no abnormal cardiopulmonary findings.  No respiratory distress.  Abdomen soft, nontender and no evidence of acute abdomen.  Story is minimally concerning for ACS, PE or dissection given the atypical nature, risk factors, history and physical examination.  Patient's heart score places the patient at low risk for acute coronary syndrome.  Patient feels comfortable following up with her primary care physician and/or primary cardiologist.  Patient was given strict return precautions.  Verbalized understanding. [JK]   0205 I had a discussion with the patient/family regarding diagnosis, diagnostic results, treatment plan, and medications. The patient/family indicated understanding of these instructions. I spent adequate time at the bedside prior to discharge necessary to discuss the aftercare instructions, giving patient education, providing explanations of the results of our evaluations/findings, and my decision making to assure that the patient/family understand the plan of care. Time was allotted to answer questions at that time and throughout the ED course. Patient is required to maintain timely follow up, as discussed. I also discussed the potential for the development of an acute emergent condition requiring further evaluation, return to the ER, admission, or even surgical intervention.  I encouraged the patient to return to the emergency department immediately for any concerns, worsening symptoms, new complaints, or if symptoms persist and they are unable to seek follow-up in a timely fashion. The patient/family expressed understanding and agreement with this plan    Shared decision making:   After full review of the patient's clinical presentation, review of any work-up including but not limited to laboratory studies and radiology obtained, I had a discussion with the patient.  Treatment options were discussed as well as the risks, benefits and consequences.  I discussed all findings with the patient and family  members if available.  During the discussion, treatment goals were understood by all as well as any misconceptions which were addressed with the patient.  Ample time was given for any questions they may have had.  They are in agreement with the treatment plan as well as final disposition. [JK]      ED Course User Index  [JK] Berny Shirley MD                HEART Score: 3                              Medical Decision Making  This is a 63-year-old male with a history of dyslipidemia presenting to the emergency department with some chest discomfort.  The patient had some global chest tightness about 30 minutes prior to arrival.  He has been dealing with episodes like this before in the past.  Symptoms are concerning for ACS.  Is relatively low risk for coronary artery disease.  Patient states that he took aspirin prior to arrival.  IV access will be established and the patient.  Placed on continuous telemetry monitoring.  Workup initiated.      Differential diagnosis: CAD, ACS, peptic ulcer disease, dyspepsia, pneumonia, bronchitis, atypical chest pain, angina, musculoskeletal pain      Amount and/or Complexity of Data Reviewed  External Data Reviewed: labs, radiology, ECG and notes.     Details: External laboratories, imaging as well as notes were reviewed personally by myself.  All relevant studies were used to guide decision making.     Date of previous record: 12/22/2022    Source of note: Admission record    Summary: Patient was admitted secondary to kidney stone removal.  I did review basic laboratory studies on file as well as a previous chest x-ray, EKG.  Records reviewed    Labs: ordered. Decision-making details documented in ED Course.  Radiology: ordered and independent interpretation performed. Decision-making details documented in ED Course.  ECG/medicine tests: ordered and independent interpretation performed. Decision-making details documented in ED Course.        Final diagnoses:   Nonspecific  chest pain       ED Disposition  ED Disposition       ED Disposition   Discharge    Condition   Stable    Comment   --               [facility] CARDIOLOGY  [address]  [place] [address] [phone]  Call in 1 day           Medication List      No changes were made to your prescriptions during this visit.            Berny Shirley MD  02/08/24 0203

## 2024-02-12 ENCOUNTER — OFFICE VISIT (OUTPATIENT)
Dept: CARDIOLOGY | Facility: HOSPITAL | Age: 64
End: 2024-02-12
Payer: COMMERCIAL

## 2024-02-12 VITALS
BODY MASS INDEX: 32.39 KG/M2 | RESPIRATION RATE: 18 BRPM | SYSTOLIC BLOOD PRESSURE: 115 MMHG | DIASTOLIC BLOOD PRESSURE: 69 MMHG | TEMPERATURE: 97.3 F | HEIGHT: 71 IN | WEIGHT: 231.38 LBS | HEART RATE: 71 BPM | OXYGEN SATURATION: 93 %

## 2024-02-12 DIAGNOSIS — E78.5 HYPERLIPIDEMIA, UNSPECIFIED HYPERLIPIDEMIA TYPE: ICD-10-CM

## 2024-02-12 DIAGNOSIS — I20.89 EXERTIONAL ANGINA: Primary | ICD-10-CM

## 2024-02-12 DIAGNOSIS — R53.83 OTHER FATIGUE: ICD-10-CM

## 2024-02-12 DIAGNOSIS — R94.31 ABNORMAL EKG: ICD-10-CM

## 2024-02-12 DIAGNOSIS — R06.09 DYSPNEA ON EXERTION: ICD-10-CM

## 2024-02-12 PROCEDURE — 99214 OFFICE O/P EST MOD 30 MIN: CPT | Performed by: NURSE PRACTITIONER

## 2024-02-13 ENCOUNTER — HOSPITAL ENCOUNTER (OUTPATIENT)
Dept: CARDIOLOGY | Facility: HOSPITAL | Age: 64
Discharge: HOME | End: 2024-02-13
Admitting: NURSE PRACTITIONER
Payer: COMMERCIAL

## 2024-02-13 VITALS
DIASTOLIC BLOOD PRESSURE: 86 MMHG | OXYGEN SATURATION: 97 % | BODY MASS INDEX: 32.41 KG/M2 | HEIGHT: 71 IN | SYSTOLIC BLOOD PRESSURE: 136 MMHG | HEART RATE: 56 BPM | WEIGHT: 231.48 LBS

## 2024-02-13 DIAGNOSIS — R06.09 DYSPNEA ON EXERTION: ICD-10-CM

## 2024-02-13 DIAGNOSIS — R94.31 ABNORMAL EKG: ICD-10-CM

## 2024-02-13 DIAGNOSIS — I20.89 EXERTIONAL ANGINA: ICD-10-CM

## 2024-02-13 DIAGNOSIS — R53.83 OTHER FATIGUE: ICD-10-CM

## 2024-02-13 DIAGNOSIS — E78.5 HYPERLIPIDEMIA, UNSPECIFIED HYPERLIPIDEMIA TYPE: ICD-10-CM

## 2024-02-13 LAB
BH CV REST NUCLEAR ISOTOPE DOSE: 9.7 MCI
BH CV STRESS BP STAGE 1: NORMAL
BH CV STRESS BP STAGE 2: NORMAL
BH CV STRESS BP STAGE 3: NORMAL
BH CV STRESS DURATION MIN STAGE 1: 3
BH CV STRESS DURATION MIN STAGE 2: 3
BH CV STRESS DURATION MIN STAGE 3: 3
BH CV STRESS DURATION SEC STAGE 1: 0
BH CV STRESS DURATION SEC STAGE 2: 0
BH CV STRESS DURATION SEC STAGE 3: 30
BH CV STRESS GRADE STAGE 1: 10
BH CV STRESS GRADE STAGE 2: 12
BH CV STRESS GRADE STAGE 3: 14
BH CV STRESS HR STAGE 1: 97
BH CV STRESS HR STAGE 2: 116
BH CV STRESS HR STAGE 3: 130
BH CV STRESS METS STAGE 1: 5
BH CV STRESS METS STAGE 2: 7.5
BH CV STRESS METS STAGE 3: 10
BH CV STRESS NUCLEAR ISOTOPE DOSE: 33 MCI
BH CV STRESS O2 STAGE 1: 98
BH CV STRESS O2 STAGE 2: 95
BH CV STRESS O2 STAGE 3: 98
BH CV STRESS PROTOCOL 1: NORMAL
BH CV STRESS RECOVERY BP: NORMAL MMHG
BH CV STRESS RECOVERY HR: 86 BPM
BH CV STRESS RECOVERY O2: 98 %
BH CV STRESS SPEED STAGE 1: 1.7
BH CV STRESS SPEED STAGE 2: 2.5
BH CV STRESS SPEED STAGE 3: 3.4
BH CV STRESS STAGE 1: 1
BH CV STRESS STAGE 2: 2
BH CV STRESS STAGE 3: 3
LV EF NUC BP: 86 %
MAXIMAL PREDICTED HEART RATE: 155 BPM
PERCENT MAX PREDICTED HR: 85.81 %
STRESS BASELINE BP: NORMAL MMHG
STRESS BASELINE HR: 56 BPM
STRESS O2 SAT REST: 97 %
STRESS PERCENT HR: 101 %
STRESS POST ESTIMATED WORKLOAD: 10.1 METS
STRESS POST EXERCISE DUR MIN: 9 MIN
STRESS POST EXERCISE DUR SEC: 30 SEC
STRESS POST O2 SAT PEAK: 95 %
STRESS POST PEAK BP: NORMAL MMHG
STRESS POST PEAK HR: 133 BPM
STRESS TARGET HR: 132 BPM

## 2024-02-13 PROCEDURE — 93018 CV STRESS TEST I&R ONLY: CPT | Performed by: INTERNAL MEDICINE

## 2024-02-13 PROCEDURE — A9500 TC99M SESTAMIBI: HCPCS | Performed by: NURSE PRACTITIONER

## 2024-02-13 PROCEDURE — 93017 CV STRESS TEST TRACING ONLY: CPT

## 2024-02-13 PROCEDURE — 0 TECHNETIUM SESTAMIBI: Performed by: NURSE PRACTITIONER

## 2024-02-13 PROCEDURE — 78452 HT MUSCLE IMAGE SPECT MULT: CPT

## 2024-02-13 PROCEDURE — 78452 HT MUSCLE IMAGE SPECT MULT: CPT | Performed by: INTERNAL MEDICINE

## 2024-02-13 RX ADMIN — TECHNETIUM TC 99M SESTAMIBI 1 DOSE: 1 INJECTION INTRAVENOUS at 08:45

## 2024-02-13 RX ADMIN — TECHNETIUM TC 99M SESTAMIBI 1 DOSE: 1 INJECTION INTRAVENOUS at 07:13

## 2024-02-22 LAB
QT INTERVAL: 376 MS
QT INTERVAL: 404 MS
QTC INTERVAL: 432 MS
QTC INTERVAL: 433 MS

## 2024-03-04 ENCOUNTER — OFFICE VISIT (OUTPATIENT)
Dept: INTERNAL MEDICINE | Facility: CLINIC | Age: 64
End: 2024-03-04
Payer: COMMERCIAL

## 2024-03-04 VITALS
SYSTOLIC BLOOD PRESSURE: 126 MMHG | TEMPERATURE: 97.3 F | WEIGHT: 229.6 LBS | BODY MASS INDEX: 32.14 KG/M2 | HEART RATE: 72 BPM | OXYGEN SATURATION: 94 % | HEIGHT: 71 IN | RESPIRATION RATE: 16 BRPM | DIASTOLIC BLOOD PRESSURE: 84 MMHG

## 2024-03-04 DIAGNOSIS — R51.9 FREQUENT HEADACHES: Primary | ICD-10-CM

## 2024-03-04 DIAGNOSIS — F41.9 ANXIETY: ICD-10-CM

## 2024-03-04 DIAGNOSIS — G44.209 TENSION HEADACHE: ICD-10-CM

## 2024-03-04 DIAGNOSIS — Z12.5 SCREENING FOR PROSTATE CANCER: ICD-10-CM

## 2024-03-04 DIAGNOSIS — E78.2 MIXED HYPERLIPIDEMIA: ICD-10-CM

## 2024-03-04 DIAGNOSIS — R47.89 WORD FINDING DIFFICULTY: ICD-10-CM

## 2024-03-04 DIAGNOSIS — E55.9 VITAMIN D DEFICIENCY: ICD-10-CM

## 2024-03-04 DIAGNOSIS — F43.10 PTSD (POST-TRAUMATIC STRESS DISORDER): ICD-10-CM

## 2024-03-04 PROCEDURE — 99214 OFFICE O/P EST MOD 30 MIN: CPT | Performed by: NURSE PRACTITIONER

## 2024-03-04 RX ORDER — CYCLOBENZAPRINE HCL 10 MG
10 TABLET ORAL NIGHTLY PRN
Qty: 30 TABLET | Refills: 2 | Status: SHIPPED | OUTPATIENT
Start: 2024-03-04

## 2024-03-04 NOTE — PROGRESS NOTES
Subjective   Kennedy Griffith is a 63 y.o. male    Chief Complaint   Patient presents with    Headache     Everyday tension headaches      History of Present Illness     Kennedy c/o more frequent and intense HA.  Describes as a pressure on both sides of his temples, along with pressure in the top of his head and back of his neck.  Feels like he is also having trouble with word finding.  He is in Propranolol BID, taking Naprosyn and ASA PRN.  Last eye exam was 1 year ago.  He is under a lot of stress with work.  Neck feels very tight.      HL - chronic and stable on Crestor 10 mg daily  Lab Results   Component Value Date    CHOL 238 (H) 03/06/2024    TRIG 169 (H) 03/06/2024    HDL 39 (L) 03/06/2024     (H) 03/06/2024     Depression/anxiety - pt lost is daughter to PTSD/Suicide 7-8 yrs ago.  He himself has battled depression and anxiety since.  He was on Cymbalta 60 mg daily and Buspar 10 mg BID at his initial visit and I increased his Buspar to 15 mg TID based on c/o increased anxiety.   Has also been given Ativan in the past for PRN use.  Also taking Propranolol 20 mg BID.  He is now seeing Psych APRN at [facility].       He is also prescribed adderall 20 mg TID.  States that he has been on this for 20+ years.  Was placed on this due to chronic/excessive daytime fatigue.  Was originally prescribed Provigil and later changed to Adderall due to insurance purposes - now followed by Psych.     GERD - chronic and stable on Omeprazole 20 mg PO daily     COVID - 1/29/21, 3/9/2021, 12/3/2021  Tdap - declines for today  Flu shot - 12/3/2021  Shingrix - will ck a the pharmacy  Cologuard  - 2/2022  Hep A - declines today    The following portions of the patient's history were reviewed and updated as appropriate: allergies, current medications, past family history, past medical history, past social history, past surgical history, and problem list.    Current Outpatient Medications:     buPROPion (WELLBUTRIN) 75 MG  tablet, Take 1 tablet by mouth Daily. NEEDS REFILL, Disp: , Rfl:     busPIRone (BUSPAR) 15 MG tablet, Take 1 tablet by mouth 3 (Three) Times a Day., Disp: 270 tablet, Rfl: 1    DULoxetine (CYMBALTA) 60 MG capsule, TAKE 1 CAPSULE BY MOUTH EVERY DAY, Disp: 90 capsule, Rfl: 0    fexofenadine (Allegra Allergy) 180 MG tablet, Take 1 tablet by mouth Daily., Disp: 90 tablet, Rfl: 3    propranolol (INDERAL) 20 MG tablet, Take 1 tablet by mouth 2 (Two) Times a Day., Disp: , Rfl:     rosuvastatin (CRESTOR) 10 MG tablet, Take 1 tablet by mouth Daily. (Patient taking differently: Take 1 tablet by mouth Daily. NEEDS REFILL), Disp: 90 tablet, Rfl: 1    amphetamine-dextroamphetamine (ADDERALL) 20 MG tablet, Take 1 tablet by mouth 3 (Three) Times a Day. (Patient not taking: Reported on 3/4/2024), Disp: , Rfl:     cyclobenzaprine (FLEXERIL) 10 MG tablet, Take 1 tablet by mouth At Night As Needed for Muscle Spasms (muscle spasm, tension)., Disp: 30 tablet, Rfl: 2     Review of Systems   Constitutional:  Negative for chills, fatigue and fever.   Respiratory:  Negative for cough, chest tightness and shortness of breath.    Cardiovascular:  Negative for chest pain.   Gastrointestinal:  Negative for abdominal pain, diarrhea, nausea and vomiting.   Endocrine: Negative for cold intolerance and heat intolerance.   Musculoskeletal:  Positive for myalgias, neck pain and neck stiffness. Negative for arthralgias.   Neurological:  Positive for headaches. Negative for dizziness.        Difficulty with word finding       Objective   Physical Exam  Constitutional:       Appearance: He is well-developed.   HENT:      Head: Normocephalic and atraumatic.   Eyes:      Conjunctiva/sclera: Conjunctivae normal.      Pupils: Pupils are equal, round, and reactive to light.   Cardiovascular:      Rate and Rhythm: Normal rate and regular rhythm.      Heart sounds: Normal heart sounds.   Pulmonary:      Effort: Pulmonary effort is normal.      Breath sounds:  "Normal breath sounds.   Abdominal:      General: Bowel sounds are normal.      Palpations: Abdomen is soft.   Musculoskeletal:         General: Normal range of motion.      Cervical back: Normal range of motion. Pain with movement and muscular tenderness present. Decreased range of motion.   Skin:     General: Skin is warm and dry.   Neurological:      Mental Status: He is alert and oriented to person, place, and time.      Deep Tendon Reflexes: Reflexes are normal and symmetric.   Psychiatric:         Behavior: Behavior normal.         Thought Content: Thought content normal.         Judgment: Judgment normal.       Vitals:    03/04/24 1605   BP: 126/84   BP Location: Right arm   Patient Position: Sitting   Cuff Size: Adult   Pulse: 72   Resp: 16   Temp: 97.3 °F (36.3 °C)   TempSrc: Infrared   SpO2: 94%   Weight: 104 kg (229 lb 9.6 oz)   Height: 180.3 cm (70.98\")         Assessment & Plan   Diagnoses and all orders for this visit:    1. Frequent headaches (Primary)  -     CBC & Differential; Future  -     Comprehensive Metabolic Panel; Future  -     Lipid Panel; Future  -     TSH; Future  -     Vitamin B12; Future  -     Vitamin D,25-Hydroxy; Future  -     MRI Brain With & Without Contrast; Future    2. Word finding difficulty  -     CBC & Differential; Future  -     Comprehensive Metabolic Panel; Future  -     Lipid Panel; Future  -     TSH; Future  -     Vitamin B12; Future  -     Vitamin D,25-Hydroxy; Future  -     MRI Brain With & Without Contrast; Future    3. Mixed hyperlipidemia  -     CBC & Differential; Future  -     Comprehensive Metabolic Panel; Future  -     Lipid Panel; Future  -     TSH; Future  -     Vitamin B12; Future  -     Vitamin D,25-Hydroxy; Future    4. Anxiety  -     CBC & Differential; Future  -     Comprehensive Metabolic Panel; Future  -     Lipid Panel; Future  -     TSH; Future  -     Vitamin B12; Future  -     Vitamin D,25-Hydroxy; Future    5. PTSD (post-traumatic stress disorder)  -  "    CBC & Differential; Future  -     Comprehensive Metabolic Panel; Future  -     Lipid Panel; Future  -     TSH; Future  -     Vitamin B12; Future  -     Vitamin D,25-Hydroxy; Future    6. Vitamin D deficiency  -     CBC & Differential; Future  -     Comprehensive Metabolic Panel; Future  -     Lipid Panel; Future  -     TSH; Future  -     Vitamin B12; Future  -     Vitamin D,25-Hydroxy; Future    7. Screening for prostate cancer  -     PSA Screen; Future    8. Tension headache  -     cyclobenzaprine (FLEXERIL) 10 MG tablet; Take 1 tablet by mouth At Night As Needed for Muscle Spasms (muscle spasm, tension).  Dispense: 30 tablet; Refill: 2    Will ck labs  Will also ck MRI of the brain due to difficulty with word finding  Muscle relaxer for tension  Return in about 6 weeks (around 4/15/2024) for f/u.

## 2024-03-06 ENCOUNTER — LAB (OUTPATIENT)
Dept: INTERNAL MEDICINE | Facility: CLINIC | Age: 64
End: 2024-03-06
Payer: COMMERCIAL

## 2024-03-06 DIAGNOSIS — E78.2 MIXED HYPERLIPIDEMIA: ICD-10-CM

## 2024-03-06 DIAGNOSIS — R51.9 FREQUENT HEADACHES: ICD-10-CM

## 2024-03-06 DIAGNOSIS — R47.89 WORD FINDING DIFFICULTY: ICD-10-CM

## 2024-03-06 DIAGNOSIS — F41.9 ANXIETY: ICD-10-CM

## 2024-03-06 DIAGNOSIS — E55.9 VITAMIN D DEFICIENCY: ICD-10-CM

## 2024-03-06 DIAGNOSIS — Z12.5 SCREENING FOR PROSTATE CANCER: ICD-10-CM

## 2024-03-06 DIAGNOSIS — F43.10 PTSD (POST-TRAUMATIC STRESS DISORDER): ICD-10-CM

## 2024-03-06 LAB
25(OH)D3 SERPL-MCNC: 28.2 NG/ML (ref 30–100)
ALBUMIN SERPL-MCNC: 4.2 G/DL (ref 3.5–5.2)
ALBUMIN/GLOB SERPL: 1.5 G/DL
ALP SERPL-CCNC: 66 U/L (ref 39–117)
ALT SERPL W P-5'-P-CCNC: 42 U/L (ref 1–41)
ANION GAP SERPL CALCULATED.3IONS-SCNC: 14.5 MMOL/L (ref 5–15)
AST SERPL-CCNC: 31 U/L (ref 1–40)
BASOPHILS # BLD AUTO: 0.06 10*3/MM3 (ref 0–0.2)
BASOPHILS NFR BLD AUTO: 0.9 % (ref 0–1.5)
BILIRUB SERPL-MCNC: 0.6 MG/DL (ref 0–1.2)
BUN SERPL-MCNC: 16 MG/DL (ref 8–23)
BUN/CREAT SERPL: 15.4 (ref 7–25)
CALCIUM SPEC-SCNC: 9.1 MG/DL (ref 8.6–10.5)
CHLORIDE SERPL-SCNC: 102 MMOL/L (ref 98–107)
CHOLEST SERPL-MCNC: 238 MG/DL (ref 0–200)
CO2 SERPL-SCNC: 23.5 MMOL/L (ref 22–29)
CREAT SERPL-MCNC: 1.04 MG/DL (ref 0.76–1.27)
DEPRECATED RDW RBC AUTO: 39.3 FL (ref 37–54)
EGFRCR SERPLBLD CKD-EPI 2021: 79.7 ML/MIN/1.73
EOSINOPHIL # BLD AUTO: 0.28 10*3/MM3 (ref 0–0.4)
EOSINOPHIL NFR BLD AUTO: 4.2 % (ref 0.3–6.2)
ERYTHROCYTE [DISTWIDTH] IN BLOOD BY AUTOMATED COUNT: 12.9 % (ref 12.3–15.4)
GLOBULIN UR ELPH-MCNC: 2.8 GM/DL
GLUCOSE SERPL-MCNC: 89 MG/DL (ref 65–99)
HCT VFR BLD AUTO: 46.4 % (ref 37.5–51)
HDLC SERPL-MCNC: 39 MG/DL (ref 40–60)
HGB BLD-MCNC: 16 G/DL (ref 13–17.7)
IMM GRANULOCYTES # BLD AUTO: 0.01 10*3/MM3 (ref 0–0.05)
IMM GRANULOCYTES NFR BLD AUTO: 0.2 % (ref 0–0.5)
LDLC SERPL CALC-MCNC: 168 MG/DL (ref 0–100)
LDLC/HDLC SERPL: 4.24 {RATIO}
LYMPHOCYTES # BLD AUTO: 2.83 10*3/MM3 (ref 0.7–3.1)
LYMPHOCYTES NFR BLD AUTO: 42.5 % (ref 19.6–45.3)
MCH RBC QN AUTO: 29.1 PG (ref 26.6–33)
MCHC RBC AUTO-ENTMCNC: 34.5 G/DL (ref 31.5–35.7)
MCV RBC AUTO: 84.5 FL (ref 79–97)
MONOCYTES # BLD AUTO: 0.5 10*3/MM3 (ref 0.1–0.9)
MONOCYTES NFR BLD AUTO: 7.5 % (ref 5–12)
NEUTROPHILS NFR BLD AUTO: 2.98 10*3/MM3 (ref 1.7–7)
NEUTROPHILS NFR BLD AUTO: 44.7 % (ref 42.7–76)
NRBC BLD AUTO-RTO: 0 /100 WBC (ref 0–0.2)
PLATELET # BLD AUTO: 267 10*3/MM3 (ref 140–450)
PMV BLD AUTO: 10.1 FL (ref 6–12)
POTASSIUM SERPL-SCNC: 4.5 MMOL/L (ref 3.5–5.2)
PROT SERPL-MCNC: 7 G/DL (ref 6–8.5)
PSA SERPL-MCNC: 2.24 NG/ML (ref 0–4)
RBC # BLD AUTO: 5.49 10*6/MM3 (ref 4.14–5.8)
SODIUM SERPL-SCNC: 140 MMOL/L (ref 136–145)
TRIGL SERPL-MCNC: 169 MG/DL (ref 0–150)
TSH SERPL DL<=0.05 MIU/L-ACNC: 2.5 UIU/ML (ref 0.27–4.2)
VIT B12 BLD-MCNC: 459 PG/ML (ref 211–946)
VLDLC SERPL-MCNC: 31 MG/DL (ref 5–40)
WBC NRBC COR # BLD AUTO: 6.66 10*3/MM3 (ref 3.4–10.8)

## 2024-03-06 PROCEDURE — 84443 ASSAY THYROID STIM HORMONE: CPT | Performed by: NURSE PRACTITIONER

## 2024-03-06 PROCEDURE — 80053 COMPREHEN METABOLIC PANEL: CPT | Performed by: NURSE PRACTITIONER

## 2024-03-06 PROCEDURE — 82607 VITAMIN B-12: CPT | Performed by: NURSE PRACTITIONER

## 2024-03-06 PROCEDURE — 36415 COLL VENOUS BLD VENIPUNCTURE: CPT | Performed by: NURSE PRACTITIONER

## 2024-03-06 PROCEDURE — G0103 PSA SCREENING: HCPCS | Performed by: NURSE PRACTITIONER

## 2024-03-06 PROCEDURE — 85025 COMPLETE CBC W/AUTO DIFF WBC: CPT | Performed by: NURSE PRACTITIONER

## 2024-03-06 PROCEDURE — 80061 LIPID PANEL: CPT | Performed by: NURSE PRACTITIONER

## 2024-03-06 PROCEDURE — 82306 VITAMIN D 25 HYDROXY: CPT | Performed by: NURSE PRACTITIONER

## 2024-03-14 ENCOUNTER — TELEPHONE (OUTPATIENT)
Dept: INTERNAL MEDICINE | Facility: CLINIC | Age: 64
End: 2024-03-14
Payer: COMMERCIAL

## 2024-03-14 RX ORDER — ROSUVASTATIN CALCIUM 20 MG/1
20 TABLET, COATED ORAL DAILY
Qty: 90 TABLET | Refills: 1 | Status: SHIPPED | OUTPATIENT
Start: 2024-03-14

## 2024-03-14 NOTE — TELEPHONE ENCOUNTER
----- Message from JAYLEN Panda sent at 3/14/2024  1:17 PM EDT -----  Vit d is low.  I recommend OTC D3, 2000 units daily.    Cholesterol is too high.  I am going to increase his Crestor.      All other labs are within acceptable limits.

## 2024-03-14 NOTE — TELEPHONE ENCOUNTER
Called and lvm for patient to return call, office number given.     RELAY:   ----- Message from JAYLEN Panda sent at 3/14/2024  1:17 PM EDT -----  Vitamin d is low.  I recommend OTC D3, 2000 units daily.     Cholesterol is too high.  I am going to increase his Crestor.       All other labs are within acceptable limits.

## 2024-04-09 ENCOUNTER — HOSPITAL ENCOUNTER (OUTPATIENT)
Dept: MRI IMAGING | Facility: HOSPITAL | Age: 64
Discharge: HOME | End: 2024-04-09
Admitting: NURSE PRACTITIONER
Payer: COMMERCIAL

## 2024-04-09 DIAGNOSIS — R47.89 WORD FINDING DIFFICULTY: ICD-10-CM

## 2024-04-09 DIAGNOSIS — R51.9 FREQUENT HEADACHES: ICD-10-CM

## 2024-04-09 PROCEDURE — A9577 INJ MULTIHANCE: HCPCS | Performed by: NURSE PRACTITIONER

## 2024-04-09 PROCEDURE — 0 GADOBENATE DIMEGLUMINE 529 MG/ML SOLUTION: Performed by: NURSE PRACTITIONER

## 2024-04-09 PROCEDURE — 70553 MRI BRAIN STEM W/O & W/DYE: CPT

## 2024-04-09 PROCEDURE — 82565 ASSAY OF CREATININE: CPT

## 2024-04-09 RX ADMIN — GADOBENATE DIMEGLUMINE 20 ML: 529 INJECTION, SOLUTION INTRAVENOUS at 15:28

## 2024-04-10 ENCOUNTER — TELEPHONE (OUTPATIENT)
Dept: INTERNAL MEDICINE | Facility: CLINIC | Age: 64
End: 2024-04-10
Payer: COMMERCIAL

## 2024-04-10 LAB — CREAT BLDA-MCNC: 0.9 MG/DL (ref 0.6–1.3)

## 2024-04-10 NOTE — TELEPHONE ENCOUNTER
Left patient voicemail letting him know of MRI results, if he had any questions to give a call back. Office number provided.

## 2024-07-05 ENCOUNTER — TELEPHONE (OUTPATIENT)
Dept: INTERNAL MEDICINE | Facility: CLINIC | Age: 64
End: 2024-07-05

## 2024-07-05 NOTE — TELEPHONE ENCOUNTER
Caller: Kennedy Griffith    Relationship to patient: Self    Best call back number:      Chief complaint: HEADACHES    Type of visit: OFFICE VISIT    Requested date: PATIENT WOULD LIKE TO BE SEEN ON 071224     If rescheduling, when is the original appointment: NA     Additional notes:THERE WERENT ANY APPTS OPEN TO MEET PATIENTS NEEDS , PLEASE CALL AND ADVISE OF APPT   ALSO, PATIENT ONLY WANTS TO SEE GILLIAN ALEXANDRA

## 2024-07-10 ENCOUNTER — TELEPHONE (OUTPATIENT)
Dept: INTERNAL MEDICINE | Facility: CLINIC | Age: 64
End: 2024-07-10
Payer: COMMERCIAL

## 2024-07-10 NOTE — TELEPHONE ENCOUNTER
Message left for patient to call me back regarding appointment he wanted scheduled for Friday 7/12/2024. Carline Schmitt has no availability but in my message I stated we could schedule him with Sofía Evans on Friday 7/12/2024.

## 2024-07-24 ENCOUNTER — HOSPITAL ENCOUNTER (INPATIENT)
Facility: HOSPITAL | Age: 64
LOS: 2 days | Discharge: HOME | DRG: 063 | End: 2024-07-26
Attending: EMERGENCY MEDICINE | Admitting: INTERNAL MEDICINE
Payer: COMMERCIAL

## 2024-07-24 ENCOUNTER — APPOINTMENT (OUTPATIENT)
Dept: GENERAL RADIOLOGY | Facility: HOSPITAL | Age: 64
DRG: 063 | End: 2024-07-24
Payer: COMMERCIAL

## 2024-07-24 ENCOUNTER — APPOINTMENT (OUTPATIENT)
Dept: CT IMAGING | Facility: HOSPITAL | Age: 64
DRG: 063 | End: 2024-07-24
Payer: COMMERCIAL

## 2024-07-24 ENCOUNTER — APPOINTMENT (OUTPATIENT)
Dept: MRI IMAGING | Facility: HOSPITAL | Age: 64
DRG: 063 | End: 2024-07-24
Payer: COMMERCIAL

## 2024-07-24 DIAGNOSIS — I63.9 CEREBROVASCULAR ACCIDENT (CVA), UNSPECIFIED MECHANISM: ICD-10-CM

## 2024-07-24 DIAGNOSIS — Z78.9: ICD-10-CM

## 2024-07-24 DIAGNOSIS — R42 DIZZINESS: Primary | ICD-10-CM

## 2024-07-24 DIAGNOSIS — I63.9 ACUTE STROKE DUE TO ISCHEMIA: ICD-10-CM

## 2024-07-24 DIAGNOSIS — R13.10 DYSPHAGIA, UNSPECIFIED TYPE: ICD-10-CM

## 2024-07-24 LAB
ALBUMIN SERPL-MCNC: 4.4 G/DL (ref 3.5–5.2)
ALBUMIN/GLOB SERPL: 1.6 G/DL
ALP SERPL-CCNC: 77 U/L (ref 39–117)
ALT SERPL W P-5'-P-CCNC: 51 U/L (ref 1–41)
ANION GAP SERPL CALCULATED.3IONS-SCNC: 8 MMOL/L (ref 5–15)
AST SERPL-CCNC: 38 U/L (ref 1–40)
BASOPHILS # BLD AUTO: 0.04 10*3/MM3 (ref 0–0.2)
BASOPHILS NFR BLD AUTO: 0.5 % (ref 0–1.5)
BILIRUB SERPL-MCNC: 0.4 MG/DL (ref 0–1.2)
BILIRUB UR QL STRIP: NEGATIVE
BUN BLDA-MCNC: 17 MG/DL (ref 8–26)
BUN SERPL-MCNC: 16 MG/DL (ref 8–23)
BUN/CREAT SERPL: 15.5 (ref 7–25)
CA-I BLDA-SCNC: 1.22 MMOL/L (ref 1.2–1.32)
CALCIUM SPEC-SCNC: 8.8 MG/DL (ref 8.6–10.5)
CHLORIDE BLDA-SCNC: 105 MMOL/L (ref 98–109)
CHLORIDE SERPL-SCNC: 106 MMOL/L (ref 98–107)
CLARITY UR: CLEAR
CO2 BLDA-SCNC: 25 MMOL/L (ref 24–29)
CO2 SERPL-SCNC: 25 MMOL/L (ref 22–29)
COLOR UR: YELLOW
CREAT BLDA-MCNC: 1 MG/DL (ref 0.6–1.3)
CREAT SERPL-MCNC: 1.03 MG/DL (ref 0.76–1.27)
D-LACTATE SERPL-SCNC: 1.5 MMOL/L (ref 0.5–2)
DEPRECATED RDW RBC AUTO: 40 FL (ref 37–54)
EGFRCR SERPLBLD CKD-EPI 2021: 80.6 ML/MIN/1.73
EGFRCR SERPLBLD CKD-EPI 2021: 83.5 ML/MIN/1.73
EOSINOPHIL # BLD AUTO: 0.12 10*3/MM3 (ref 0–0.4)
EOSINOPHIL NFR BLD AUTO: 1.5 % (ref 0.3–6.2)
ERYTHROCYTE [DISTWIDTH] IN BLOOD BY AUTOMATED COUNT: 12.6 % (ref 12.3–15.4)
FLUAV SUBTYP SPEC NAA+PROBE: NOT DETECTED
FLUBV RNA ISLT QL NAA+PROBE: NOT DETECTED
GLOBULIN UR ELPH-MCNC: 2.7 GM/DL
GLUCOSE BLDC GLUCOMTR-MCNC: 102 MG/DL (ref 70–130)
GLUCOSE BLDC GLUCOMTR-MCNC: 134 MG/DL (ref 70–130)
GLUCOSE BLDC GLUCOMTR-MCNC: 90 MG/DL (ref 70–130)
GLUCOSE SERPL-MCNC: 133 MG/DL (ref 65–99)
GLUCOSE UR STRIP-MCNC: NEGATIVE MG/DL
HCT VFR BLD AUTO: 46.5 % (ref 37.5–51)
HCT VFR BLDA CALC: 45 % (ref 38–51)
HGB BLD-MCNC: 15.6 G/DL (ref 13–17.7)
HGB BLDA-MCNC: 15.3 G/DL (ref 12–17)
HGB UR QL STRIP.AUTO: NEGATIVE
IMM GRANULOCYTES # BLD AUTO: 0.03 10*3/MM3 (ref 0–0.05)
IMM GRANULOCYTES NFR BLD AUTO: 0.4 % (ref 0–0.5)
INR PPP: 0.96 (ref 0.89–1.12)
KETONES UR QL STRIP: NEGATIVE
LEUKOCYTE ESTERASE UR QL STRIP.AUTO: NEGATIVE
LYMPHOCYTES # BLD AUTO: 2.02 10*3/MM3 (ref 0.7–3.1)
LYMPHOCYTES NFR BLD AUTO: 25.3 % (ref 19.6–45.3)
MCH RBC QN AUTO: 29.2 PG (ref 26.6–33)
MCHC RBC AUTO-ENTMCNC: 33.5 G/DL (ref 31.5–35.7)
MCV RBC AUTO: 86.9 FL (ref 79–97)
MONOCYTES # BLD AUTO: 0.45 10*3/MM3 (ref 0.1–0.9)
MONOCYTES NFR BLD AUTO: 5.6 % (ref 5–12)
NEUTROPHILS NFR BLD AUTO: 5.32 10*3/MM3 (ref 1.7–7)
NEUTROPHILS NFR BLD AUTO: 66.7 % (ref 42.7–76)
NITRITE UR QL STRIP: NEGATIVE
NRBC BLD AUTO-RTO: 0 /100 WBC (ref 0–0.2)
PH UR STRIP.AUTO: 7 [PH] (ref 5–8)
PLATELET # BLD AUTO: 247 10*3/MM3 (ref 140–450)
PMV BLD AUTO: 8.9 FL (ref 6–12)
POTASSIUM BLDA-SCNC: 3.9 MMOL/L (ref 3.5–4.9)
POTASSIUM SERPL-SCNC: 4.1 MMOL/L (ref 3.5–5.2)
PROT SERPL-MCNC: 7.1 G/DL (ref 6–8.5)
PROT UR QL STRIP: NEGATIVE
PROTHROMBIN TIME: 12.8 SECONDS (ref 12.2–14.5)
QT INTERVAL: 436 MS
QTC INTERVAL: 457 MS
RBC # BLD AUTO: 5.35 10*6/MM3 (ref 4.14–5.8)
SARS-COV-2 RNA RESP QL NAA+PROBE: NOT DETECTED
SODIUM BLD-SCNC: 141 MMOL/L (ref 138–146)
SODIUM SERPL-SCNC: 139 MMOL/L (ref 136–145)
SP GR UR STRIP: 1.07 (ref 1–1.03)
TROPONIN T SERPL HS-MCNC: 8 NG/L
UROBILINOGEN UR QL STRIP: ABNORMAL
WBC NRBC COR # BLD AUTO: 7.98 10*3/MM3 (ref 3.4–10.8)

## 2024-07-24 PROCEDURE — 70551 MRI BRAIN STEM W/O DYE: CPT

## 2024-07-24 PROCEDURE — 3E03317 INTRODUCTION OF OTHER THROMBOLYTIC INTO PERIPHERAL VEIN, PERCUTANEOUS APPROACH: ICD-10-PCS | Performed by: NURSE PRACTITIONER

## 2024-07-24 PROCEDURE — 80053 COMPREHEN METABOLIC PANEL: CPT | Performed by: EMERGENCY MEDICINE

## 2024-07-24 PROCEDURE — 85610 PROTHROMBIN TIME: CPT | Performed by: EMERGENCY MEDICINE

## 2024-07-24 PROCEDURE — 84484 ASSAY OF TROPONIN QUANT: CPT | Performed by: EMERGENCY MEDICINE

## 2024-07-24 PROCEDURE — 87636 SARSCOV2 & INF A&B AMP PRB: CPT | Performed by: EMERGENCY MEDICINE

## 2024-07-24 PROCEDURE — 25810000003 SODIUM CHLORIDE 0.9 % SOLUTION: Performed by: NURSE PRACTITIONER

## 2024-07-24 PROCEDURE — 85025 COMPLETE CBC W/AUTO DIFF WBC: CPT | Performed by: EMERGENCY MEDICINE

## 2024-07-24 PROCEDURE — 70450 CT HEAD/BRAIN W/O DYE: CPT

## 2024-07-24 PROCEDURE — 99291 CRITICAL CARE FIRST HOUR: CPT

## 2024-07-24 PROCEDURE — 25510000001 IOPAMIDOL PER 1 ML: Performed by: EMERGENCY MEDICINE

## 2024-07-24 PROCEDURE — 83605 ASSAY OF LACTIC ACID: CPT | Performed by: EMERGENCY MEDICINE

## 2024-07-24 PROCEDURE — 80047 BASIC METABLC PNL IONIZED CA: CPT

## 2024-07-24 PROCEDURE — 99223 1ST HOSP IP/OBS HIGH 75: CPT | Performed by: NURSE PRACTITIONER

## 2024-07-24 PROCEDURE — 25010000002 ALTEPLASE PER 1 MG: Performed by: NURSE PRACTITIONER

## 2024-07-24 PROCEDURE — 25010000002 ONDANSETRON PER 1 MG: Performed by: EMERGENCY MEDICINE

## 2024-07-24 PROCEDURE — 82948 REAGENT STRIP/BLOOD GLUCOSE: CPT

## 2024-07-24 PROCEDURE — 0042T HC CT CEREBRAL PERFUSION W/WO CONTRAST: CPT

## 2024-07-24 PROCEDURE — 71045 X-RAY EXAM CHEST 1 VIEW: CPT

## 2024-07-24 PROCEDURE — 70498 CT ANGIOGRAPHY NECK: CPT

## 2024-07-24 PROCEDURE — 70496 CT ANGIOGRAPHY HEAD: CPT

## 2024-07-24 PROCEDURE — 99223 1ST HOSP IP/OBS HIGH 75: CPT | Performed by: INTERNAL MEDICINE

## 2024-07-24 PROCEDURE — 81003 URINALYSIS AUTO W/O SCOPE: CPT | Performed by: EMERGENCY MEDICINE

## 2024-07-24 PROCEDURE — 85014 HEMATOCRIT: CPT

## 2024-07-24 PROCEDURE — 25810000003 SODIUM CHLORIDE 0.9 % SOLUTION: Performed by: EMERGENCY MEDICINE

## 2024-07-24 PROCEDURE — 93005 ELECTROCARDIOGRAM TRACING: CPT | Performed by: EMERGENCY MEDICINE

## 2024-07-24 PROCEDURE — 92610 EVALUATE SWALLOWING FUNCTION: CPT

## 2024-07-24 RX ORDER — ASPIRIN 325 MG
325 TABLET ORAL DAILY
Status: DISCONTINUED | OUTPATIENT
Start: 2024-07-25 | End: 2024-07-26

## 2024-07-24 RX ORDER — SODIUM CHLORIDE 0.9 % (FLUSH) 0.9 %
10 SYRINGE (ML) INJECTION EVERY 12 HOURS SCHEDULED
Status: DISCONTINUED | OUTPATIENT
Start: 2024-07-24 | End: 2024-07-25

## 2024-07-24 RX ORDER — NITROGLYCERIN 0.4 MG/1
0.4 TABLET SUBLINGUAL
Status: DISCONTINUED | OUTPATIENT
Start: 2024-07-24 | End: 2024-07-26 | Stop reason: HOSPADM

## 2024-07-24 RX ORDER — SODIUM CHLORIDE 9 MG/ML
40 INJECTION, SOLUTION INTRAVENOUS AS NEEDED
Status: DISCONTINUED | OUTPATIENT
Start: 2024-07-24 | End: 2024-07-25

## 2024-07-24 RX ORDER — MECLIZINE HYDROCHLORIDE 25 MG/1
25 TABLET ORAL 3 TIMES DAILY PRN
Status: DISCONTINUED | OUTPATIENT
Start: 2024-07-24 | End: 2024-07-26 | Stop reason: HOSPADM

## 2024-07-24 RX ORDER — SODIUM CHLORIDE 0.9 % (FLUSH) 0.9 %
10 SYRINGE (ML) INJECTION AS NEEDED
Status: DISCONTINUED | OUTPATIENT
Start: 2024-07-24 | End: 2024-07-25

## 2024-07-24 RX ORDER — BISACODYL 5 MG/1
5 TABLET, DELAYED RELEASE ORAL DAILY PRN
Status: DISCONTINUED | OUTPATIENT
Start: 2024-07-24 | End: 2024-07-26 | Stop reason: HOSPADM

## 2024-07-24 RX ORDER — POLYETHYLENE GLYCOL 3350 17 G/17G
17 POWDER, FOR SOLUTION ORAL DAILY PRN
Status: DISCONTINUED | OUTPATIENT
Start: 2024-07-24 | End: 2024-07-26 | Stop reason: HOSPADM

## 2024-07-24 RX ORDER — SODIUM CHLORIDE 9 MG/ML
0.81 INJECTION, SOLUTION INTRAVENOUS ONCE
Status: COMPLETED | OUTPATIENT
Start: 2024-07-24 | End: 2024-07-24

## 2024-07-24 RX ORDER — BISACODYL 10 MG
10 SUPPOSITORY, RECTAL RECTAL DAILY PRN
Status: DISCONTINUED | OUTPATIENT
Start: 2024-07-24 | End: 2024-07-26 | Stop reason: HOSPADM

## 2024-07-24 RX ORDER — ASPIRIN 300 MG/1
300 SUPPOSITORY RECTAL DAILY
Status: DISCONTINUED | OUTPATIENT
Start: 2024-07-25 | End: 2024-07-26

## 2024-07-24 RX ORDER — SODIUM CHLORIDE 0.9 % (FLUSH) 0.9 %
10 SYRINGE (ML) INJECTION AS NEEDED
Status: DISCONTINUED | OUTPATIENT
Start: 2024-07-24 | End: 2024-07-26 | Stop reason: HOSPADM

## 2024-07-24 RX ORDER — SODIUM CHLORIDE 0.9 % (FLUSH) 0.9 %
10 SYRINGE (ML) INJECTION EVERY 12 HOURS SCHEDULED
Status: DISCONTINUED | OUTPATIENT
Start: 2024-07-24 | End: 2024-07-26 | Stop reason: HOSPADM

## 2024-07-24 RX ORDER — ATORVASTATIN CALCIUM 40 MG/1
80 TABLET, FILM COATED ORAL NIGHTLY
Status: DISCONTINUED | OUTPATIENT
Start: 2024-07-24 | End: 2024-07-26 | Stop reason: HOSPADM

## 2024-07-24 RX ORDER — ONDANSETRON 2 MG/ML
4 INJECTION INTRAMUSCULAR; INTRAVENOUS EVERY 8 HOURS PRN
Status: DISCONTINUED | OUTPATIENT
Start: 2024-07-24 | End: 2024-07-26 | Stop reason: HOSPADM

## 2024-07-24 RX ORDER — AMOXICILLIN 250 MG
2 CAPSULE ORAL 2 TIMES DAILY
Status: DISCONTINUED | OUTPATIENT
Start: 2024-07-24 | End: 2024-07-26 | Stop reason: HOSPADM

## 2024-07-24 RX ORDER — SODIUM CHLORIDE 9 MG/ML
40 INJECTION, SOLUTION INTRAVENOUS AS NEEDED
Status: DISCONTINUED | OUTPATIENT
Start: 2024-07-24 | End: 2024-07-26 | Stop reason: HOSPADM

## 2024-07-24 RX ORDER — ACETAMINOPHEN 325 MG/1
650 TABLET ORAL EVERY 6 HOURS PRN
Status: DISCONTINUED | OUTPATIENT
Start: 2024-07-24 | End: 2024-07-26 | Stop reason: HOSPADM

## 2024-07-24 RX ORDER — ONDANSETRON 2 MG/ML
4 INJECTION INTRAMUSCULAR; INTRAVENOUS ONCE
Status: COMPLETED | OUTPATIENT
Start: 2024-07-24 | End: 2024-07-24

## 2024-07-24 RX ADMIN — Medication 10 ML: at 20:42

## 2024-07-24 RX ADMIN — ONDANSETRON 4 MG: 2 INJECTION INTRAMUSCULAR; INTRAVENOUS at 14:06

## 2024-07-24 RX ADMIN — SODIUM CHLORIDE 0.81 ML/KG/HR: 9 INJECTION, SOLUTION INTRAVENOUS at 15:24

## 2024-07-24 RX ADMIN — SODIUM CHLORIDE 1000 ML: 9 INJECTION, SOLUTION INTRAVENOUS at 14:07

## 2024-07-24 RX ADMIN — MECLIZINE HYDROCHLORIDE 25 MG: 25 TABLET ORAL at 23:58

## 2024-07-24 RX ADMIN — ACETAMINOPHEN 650 MG: 325 TABLET ORAL at 17:22

## 2024-07-24 RX ADMIN — ATORVASTATIN CALCIUM 80 MG: 40 TABLET, FILM COATED ORAL at 20:42

## 2024-07-24 RX ADMIN — ALTEPLASE 9 MG: KIT at 14:29

## 2024-07-24 RX ADMIN — WATER 81 MG: 1 INJECTION INTRAMUSCULAR; INTRAVENOUS; SUBCUTANEOUS at 14:30

## 2024-07-24 RX ADMIN — IOPAMIDOL 120 ML: 755 INJECTION, SOLUTION INTRAVENOUS at 13:13

## 2024-07-24 NOTE — THERAPY EVALUATION
Acute Care - Speech Language Pathology   Swallow Initial Evaluation [facility]  Clinical Swallow Evaluation     Patient Name: Kennedy Griffith  : 1960  MRN: [id number]  Today's Date: 2024               Admit Date: 2024    Visit Dx:     ICD-10-CM ICD-9-CM   1. Dizziness  R42 780.4   2. Acute stroke due to ischemia  I63.9 434.91   3. Thrombolytic medication administered within last 5 days  Z78.9 V49.89   4. Dysphagia, unspecified type  R13.10 787.20     Patient Active Problem List   Diagnosis    Anxiety    PTSD (post-traumatic stress disorder)    Mixed hyperlipidemia    Vitamin D deficiency    Nephrolithiasis    Gross hematuria    Stroke     Past Medical History:   Diagnosis Date    Depression     Fatigue     chronic    History of kidney stones     Hyperlipidemia     Sleepiness     Tension headache      Past Surgical History:   Procedure Laterality Date    LIVER BIOPSY      benign    URETEROSCOPY LASER LITHOTRIPSY WITH STENT INSERTION Right 2022    Procedure: CYSTOSCOPY, RIGHT URETEROSCOPY RETROGRADE WITH STENT INSERTION;  Surgeon: Tej Christine MD;  Location:  [facility];  Service: Urology;  Laterality: Right;    URETEROSCOPY LASER LITHOTRIPSY WITH STENT INSERTION Right 2022    Procedure: URETEROSCOPY LASER LITHOTRIPSY WITH STENT INSERTION RIGHT;  Surgeon: Tej Christine MD;  Location:  [facility];  Service: Urology;  Laterality: Right;       SLP Recommendation and Plan  SLP Swallowing Diagnosis: R/O pharyngeal dysphagia (24)  SLP Diet Recommendation: regular textures, thin liquids (24)  Recommended Precautions and Strategies: general aspiration precautions (24)  SLP Rec. for Method of Medication Administration: as tolerated (24)     Monitor for Signs of Aspiration: yes, notify SLP if any concerns, other (see comments) (any concerns, make NPO x meds and ice chips and notify SLP) (24)  Recommended Diagnostics: reassess via  "clinical swallow evaluation (07/24/24 1615)                 Oral Care Recommendations: Oral Care BID/PRN, Toothbrush (07/24/24 1615)                                        Plan of Care Reviewed With: patient, spouse      SWALLOW EVALUATION (Last 72 Hours)       SLP Adult Swallow Evaluation       Row Name 07/24/24 1615                   Rehab Evaluation    Document Type evaluation  -SM        Subjective Information no complaints  -SM        Patient Observations alert;cooperative  -SM           General Information    Patient Profile Reviewed yes  -SM        Pertinent History Of Current Problem Admit dizziness and ataxia, s/p IV tPA. R vinay infarct. Pt also reports hx uvulaplasty with intermittent dysphagia \"if not careful\". RN stopped SLP, has completed dysphagia screen. To document shortly.  -SM        Current Method of Nutrition NPO  -SM        Plans/Goals Discussed with patient;spouse/S.O.;agreed upon  -SM        Barriers to Rehab none identified  -SM        Patient's Goals for Discharge return to PO diet  -SM        Family Goals for Discharge patient able to return to PO diet  -SM           Pain    Additional Documentation Pain Scale: Numbers Pre/Post-Treatment (Group)  -SM           Pain Scale: Numbers Pre/Post-Treatment    Pretreatment Pain Rating 0/10 - no pain  -SM        Posttreatment Pain Rating 0/10 - no pain  -SM           Oral Musculature and Cranial Nerve Assessment    Oral Labial or Buccal Impairment, Detail, Cranial Nerve VII (Facial): left labial droop  -SM           Clinical Swallow Eval    Oral Prep Phase WFL  -SM        Oral Transit WFL  -SM        Oral Residue WFL  -SM        Pharyngeal Phase suspected pharyngeal impairment  -SM        Esophageal Phase unremarkable  -SM        Clinical Swallow Evaluation Summary Cough when took liquid mix with regular solid. Pt reports it slipped away from him and velum surgery has prevented his ability to prevent from falling into his throat. Repeat trials and no " s/s aspiration when pt swallowed solid before taking sips. Pt with good awareness and insight and this does appear to be chronic in nature. Will recheck tomorrow to ensure no acute change  -           SLP Evaluation Clinical Impression    SLP Swallowing Diagnosis R/O pharyngeal dysphagia  -           Recommendations    SLP Diet Recommendation regular textures;thin liquids  -        Recommended Diagnostics reassess via clinical swallow evaluation  -        Recommended Precautions and Strategies general aspiration precautions  -        Oral Care Recommendations Oral Care BID/PRN;Toothbrush  -        SLP Rec. for Method of Medication Administration as tolerated  -        Monitor for Signs of Aspiration yes;notify SLP if any concerns;other (see comments)  any concerns, make NPO x meds and ice chips and notify SLP  -                  User Key  (r) = Recorded By, (t) = Taken By, (c) = Cosigned By      Initials Name Effective Dates    Jennifer Ochoa MS CCC-SLP 02/03/23 -                     EDUCATION  The patient has been educated in the following areas:   Dysphagia (Swallowing Impairment) Modified Diet Instruction.                Time Calculation:    Time Calculation- SLP       Row Name 07/24/24 1634             Time Calculation- SLP    SLP Start Time 1610  -      SLP Received On 07/24/24  -         Untimed Charges    [id number] Eval Oral Pharyng Swallow Minutes 25  -SM         Total Minutes    Untimed Charges Total Minutes 25  -SM       Total Minutes 25  -SM                User Key  (r) = Recorded By, (t) = Taken By, (c) = Cosigned By      Initials Name Provider Type    Jennifer Ochoa MS CCC-SLP Speech and Language Pathologist                    Therapy Charges for Today       Code Description Service Date Service Provider Modifiers Qty    [id number] HC ST EVAL ORAL PHARYNG SWALLOW 2 7/24/2024 Jennifer Velazco MS CCC-SLP GN 1                 Jennifer Velazco MS  CCC-SLP  7/24/2024

## 2024-07-24 NOTE — PLAN OF CARE
Goal Outcome Evaluation:  Plan of Care Reviewed With: patient, spouse         SLP Swallowing Diagnosis: R/O pharyngeal dysphagia (07/24/24 4156)

## 2024-07-24 NOTE — ED PROVIDER NOTES
Subjective   History of Present Illness  63-year-old male who presents for evaluation of dizziness.  He reports that he went to sleep at roughly 2:30 AM last night did not have any symptoms.  At 10:30 AM this morning he awoke and when he attempted to get out of bed he felt dizzy, like things were spinning.  He also reports nausea and persistent retching since that given time.  The symptoms have been unrelenting.  They are exacerbated with movement.  He closes his eyes to try to minimize the dizziness.  Denies chest pain cough or shortness of breath.  No fever or infectious symptoms.  No new medications.  No suspicious food intake.  No illicit drug use.  No abdominal pain or change in bowel function.  He denies focal weakness of the face, arms, legs.  He exhibits normal speech.  He has not take medication for the symptoms.  No history of abnormal heart rhythm.  He does report having a recent cardiac workup including a stress test that did not show any acute disease.      Review of Systems   Constitutional:  Negative for chills, fatigue and fever.   HENT:  Negative for congestion, ear pain, postnasal drip, sinus pressure and sore throat.    Eyes:  Negative for pain, redness and visual disturbance.   Respiratory:  Negative for cough, chest tightness and shortness of breath.    Cardiovascular:  Negative for chest pain, palpitations and leg swelling.   Gastrointestinal:  Positive for nausea and vomiting. Negative for abdominal pain, anal bleeding, blood in stool and diarrhea.   Endocrine: Negative for polydipsia and polyuria.   Genitourinary:  Negative for difficulty urinating, dysuria, frequency and urgency.   Musculoskeletal:  Negative for arthralgias, back pain and neck pain.   Skin:  Negative for pallor and rash.   Allergic/Immunologic: Negative for environmental allergies and immunocompromised state.   Neurological:  Positive for dizziness. Negative for weakness and headaches.   Hematological:  Negative for  adenopathy.   Psychiatric/Behavioral:  Negative for confusion, self-injury and suicidal ideas. The patient is not nervous/anxious.    All other systems reviewed and are negative.      Past Medical History:   Diagnosis Date    Depression     Fatigue     chronic    History of kidney stones     Hyperlipidemia     Sleepiness     Tension headache        No Known Allergies    Past Surgical History:   Procedure Laterality Date    LIVER BIOPSY      benign    URETEROSCOPY LASER LITHOTRIPSY WITH STENT INSERTION Right 12/16/2022    Procedure: CYSTOSCOPY, RIGHT URETEROSCOPY RETROGRADE WITH STENT INSERTION;  Surgeon: Tej Christine MD;  Location:  [facility];  Service: Urology;  Laterality: Right;    URETEROSCOPY LASER LITHOTRIPSY WITH STENT INSERTION Right 12/22/2022    Procedure: URETEROSCOPY LASER LITHOTRIPSY WITH STENT INSERTION RIGHT;  Surgeon: Tej Christine MD;  Location:  [facility];  Service: Urology;  Laterality: Right;       Family History   Problem Relation Age of Onset    Other Mother         cardiac arrhythmia/pacer    Arthritis Father     No Known Problems Sister     No Known Problems Brother     Colon cancer Maternal Grandmother     No Known Problems Maternal Grandfather     Heart disease Paternal Grandmother     Coronary artery disease Paternal Grandfather     Lung cancer Paternal Grandfather     Other Daughter         PTSD/suicide    No Known Problems Son     ADD / ADHD Son        Social History     Socioeconomic History    Marital status:    Tobacco Use    Smoking status: Never     Passive exposure: Never    Smokeless tobacco: Never   Vaping Use    Vaping status: Never Used   Substance and Sexual Activity    Alcohol use: Yes     Comment: on rare occasion    Drug use: Never    Sexual activity: Defer     Comment:            Objective   Physical Exam  Vitals and nursing note reviewed.   Constitutional:       General: He is not in acute distress.     Appearance: Normal appearance. He is well-developed.  He is not toxic-appearing or diaphoretic.   HENT:      Head: Normocephalic and atraumatic.      Right Ear: External ear normal.      Left Ear: External ear normal.      Nose: Nose normal.   Eyes:      General: Lids are normal.      Pupils: Pupils are equal, round, and reactive to light.   Neck:      Trachea: No tracheal deviation.   Cardiovascular:      Rate and Rhythm: Normal rate and regular rhythm.      Pulses: No decreased pulses.      Heart sounds: Normal heart sounds. No murmur heard.     No friction rub. No gallop.   Pulmonary:      Effort: Pulmonary effort is normal. No respiratory distress.      Breath sounds: Normal breath sounds. No decreased breath sounds, wheezing, rhonchi or rales.   Abdominal:      General: Bowel sounds are normal.      Palpations: Abdomen is soft.      Tenderness: There is no abdominal tenderness. There is no guarding or rebound.   Musculoskeletal:         General: No deformity. Normal range of motion.      Cervical back: Normal range of motion and neck supple.   Lymphadenopathy:      Cervical: No cervical adenopathy.   Skin:     General: Skin is warm and dry.      Findings: No rash.   Neurological:      Mental Status: He is alert and oriented to person, place, and time.      GCS: GCS eye subscore is 4. GCS verbal subscore is 5. GCS motor subscore is 6.      Cranial Nerves: No cranial nerve deficit.      Sensory: No sensory deficit.      Comments: No focal neurologic deficits.    GCS 15.   Psychiatric:         Speech: Speech normal.         Behavior: Behavior normal.         Thought Content: Thought content normal.         Judgment: Judgment normal.         Critical Care    Performed by: Xavi Schaefer MD  Authorized by: Xavi Schaefer MD    Critical care provider statement:     Critical care time (minutes):  35    Critical care time was exclusive of:  Separately billable procedures and treating other patients    Critical care was necessary to treat or prevent imminent  or life-threatening deterioration of the following conditions:  CNS failure or compromise    Critical care was time spent personally by me on the following activities:  Development of treatment plan with patient or surrogate, discussions with consultants, evaluation of patient's response to treatment, examination of patient, obtaining history from patient or surrogate, ordering and performing treatments and interventions, ordering and review of laboratory studies, ordering and review of radiographic studies, pulse oximetry, re-evaluation of patient's condition and review of old charts    I assumed direction of critical care for this patient from another provider in my specialty: no      Care discussed with: admitting provider               ED Course  ED Course as of 07/27/24 0814   Wed Jul 24, 2024   1254 Patient evaluated by myself in the lobby.  Not a tenecteplase candidate based off last known well time.  Will run as a stroke alert secondary to symptoms occurring within 24 hours prior to current presentation.    Stroke service informed. [NS]      ED Course User Index  [NS] Xavi Schaefer MD                          Total (NIH Stroke Scale): 1                  Medical Decision Making  Differential diagnosis includes TIA, stroke, intracranial hemorrhage, intracranial mass, peripheral vertigo, dehydration, acute infectious process, other unspecified etiology.    Labs show elevated specific gravity concerning dehydration.  Urine does not show infection.  Normal COVID and flu test.  Normal lactic acid level.  Normal white count.  Normal kidney function electrolytes.  CT scan of head without contrast shows no acute abnormalities.    CT perfusion, and CT angiogram of head and neck are nonrevealing.    Given the patient's symptoms an emergent MRI was performed.    The stroke service evaluated the MRI and was concerned for an area of acute stroke.  Given his dizziness this is felt to potentially be debilitating.  A  discussion was had between the stroke service and the patient and the patient reportedly agreed to get tenecteplase.  This was administered by the stroke service.    I discussed the patient with the intensivist, Dr. Martin, who will admit.    Problems Addressed:  Acute stroke due to ischemia: complicated acute illness or injury with systemic symptoms that poses a threat to life or bodily functions  Dizziness: complicated acute illness or injury with systemic symptoms that poses a threat to life or bodily functions  Thrombolytic medication administered within last 5 days: complicated acute illness or injury    Amount and/or Complexity of Data Reviewed  External Data Reviewed: labs, radiology, ECG and notes.  Labs: ordered. Decision-making details documented in ED Course.  Radiology: ordered and independent interpretation performed. Decision-making details documented in ED Course.  ECG/medicine tests: ordered and independent interpretation performed. Decision-making details documented in ED Course.     Details: EKG independently interpreted by myself shows sinus rhythm, no acute ischemic changes.        Risk  Prescription drug management.  Decision regarding hospitalization.        Final diagnoses:   Dizziness   Acute stroke due to ischemia   Thrombolytic medication administered within last 5 days       ED Disposition  ED Disposition       ED Disposition   Decision to Admit    Condition   --    Comment   Level of Care: Critical Care [6]   Admitting Physician: ANA PAULA MARTIN [[id number]]                 Yue Ramos, APRN  [address] [facility] [address]  [place] [id number]  [phone]    Go on 9/25/2024  at 9:00am please arrive 15 minutes early    Carline Schmitt, APRN  [address] [place] [id number]  [phone]    Go on 8/2/2024  at 11:15 am with Dr. Neville.    Carline Schmitt APRN  [address] [place] [id number]  [phone]               Medication List         New Prescriptions      * aspirin 81 MG chewable tablet  Chew 1 tablet Daily for 20 days.     * aspirin 325 MG EC tablet  Take 1 tablet by mouth Every 6 (Six) Hours As Needed for Mild Pain.  Start taking on: August 17, 2024     atorvastatin 80 MG tablet  Commonly known as: LIPITOR  Take 1 tablet by mouth Every Night.     clopidogrel 75 MG tablet  Commonly known as: PLAVIX  Take 1 tablet by mouth Daily for 20 days.     meclizine 25 MG tablet  Commonly known as: ANTIVERT  Take 1 tablet by mouth 3 (Three) Times a Day As Needed for Dizziness for up to 6 doses. Indications: Dizzy           * This list has 2 medication(s) that are the same as other medications prescribed for you. Read the directions carefully, and ask your doctor or other care provider to review them with you.                Stop      amphetamine-dextroamphetamine 20 MG tablet  Commonly known as: ADDERALL     rosuvastatin 20 MG tablet  Commonly known as: Crestor               Where to Get Your Medications        These medications were sent to [facility]/pharmacy #[id number] - [place], [place] - [address] [facility] - [phone] [facility] [phone]   [address] [facility] [address]      Phone: [phone]   aspirin 325 MG EC tablet  aspirin 81 MG chewable tablet  atorvastatin 80 MG tablet  clopidogrel 75 MG tablet  meclizine 25 MG tablet            Xavi Schaefer MD  07/27/24 0875

## 2024-07-24 NOTE — Clinical Note
Level of Care: Critical Care [6]   Diagnosis: Stroke [079815]   Certification: I Certify That Inpatient Hospital Services Are Medically Necessary For Greater Than 2 Midnights

## 2024-07-24 NOTE — ED NOTES
Kennedy Griffith    Nursing Report ED to Floor:  Mental status: A+Ox4  Ambulatory status: Bedfast post TPA  Oxygen Therapy:  RA  Cardiac Rhythm: NSR  Admitted from: Home  Safety Concerns:  Bleed/Falls  Social Issues: N/A  ED Room #:  03    ED Nurse Phone Extension - [phone] or may call [phone].      HPI:   Chief Complaint   Patient presents with    Dizziness       Past Medical History:  Past Medical History:   Diagnosis Date    Depression     Fatigue     chronic    History of kidney stones     Hyperlipidemia     Sleepiness     Tension headache         Past Surgical History:  Past Surgical History:   Procedure Laterality Date    LIVER BIOPSY      benign    URETEROSCOPY LASER LITHOTRIPSY WITH STENT INSERTION Right 12/16/2022    Procedure: CYSTOSCOPY, RIGHT URETEROSCOPY RETROGRADE WITH STENT INSERTION;  Surgeon: Tej Christine MD;  Location: [facility];  Service: Urology;  Laterality: Right;    URETEROSCOPY LASER LITHOTRIPSY WITH STENT INSERTION Right 12/22/2022    Procedure: URETEROSCOPY LASER LITHOTRIPSY WITH STENT INSERTION RIGHT;  Surgeon: Tej Christine MD;  Location:  [facility];  Service: Urology;  Laterality: Right;        Admitting Doctor:   Rico Martin MD    Consulting Provider(s):  Consults       No orders found from 6/25/2024 to 7/25/2024.             Admitting Diagnosis:   The primary encounter diagnosis was Dizziness. Diagnoses of Acute stroke due to ischemia and Thrombolytic medication administered within last 5 days were also pertinent to this visit.    Most Recent Vitals:   Vitals:    07/24/24 1421 07/24/24 1429 07/24/24 1430 07/24/24 1435   BP: 126/74 126/74 135/80 138/76   BP Location:       Patient Position:       Pulse: 66 65 65 67   Resp:  17     Temp:       TempSrc:       SpO2: 98% 96% 96% 96%   Weight:       Height:           Active LDAs/IV Access:   Lines, Drains & Airways       Active LDAs       Name Placement date Placement time Site Days    Peripheral IV 07/24/24 1306 Right Antecubital  07/24/24  1306  Antecubital  less than 1                    Labs (abnormal labs have a star):   Labs Reviewed   COMPREHENSIVE METABOLIC PANEL - Abnormal; Notable for the following components:       Result Value    Glucose 133 (*)     ALT (SGPT) 51 (*)     All other components within normal limits    Narrative:     GFR Normal >60  Chronic Kidney Disease <60  Kidney Failure <15     POCT CHEM 8 - Abnormal; Notable for the following components:    Glucose 134 (*)     All other components within normal limits   COVID-19 AND FLU A/B, NP SWAB IN TRANSPORT MEDIA 1 HR TAT - Normal    Narrative:     Fact sheet for providers: https://www.fda.gov/media/063844/download    Fact sheet for patients: https://www.fda.gov/media/722149/download    Test performed by PCR.   PROTIME-INR - Normal   SINGLE HS TROPONIN T - Normal    Narrative:     High Sensitive Troponin T Reference Range:  <14.0 ng/L- Negative Female for AMI  <22.0 ng/L- Negative Male for AMI  >=14 - Abnormal Female indicating possible myocardial injury.  >=22 - Abnormal Male indicating possible myocardial injury.   Clinicians would have to utilize clinical acumen, EKG, Troponin, and serial changes to determine if it is an Acute Myocardial Infarction or myocardial injury due to an underlying chronic condition.        LACTIC ACID, PLASMA - Normal   CBC WITH AUTO DIFFERENTIAL - Normal   COVID PRE-OP / PRE-PROCEDURE SCREENING ORDER (NO ISOLATION)    Narrative:     The following orders were created for panel order COVID PRE-OP / PRE-PROCEDURE SCREENING ORDER (NO ISOLATION) - Swab, Nasopharynx.  Procedure                               Abnormality         Status                     ---------                               -----------         ------                     COVID-19 and FLU A/B PCR...[[id number]]  Normal              Final result                 Please view results for these tests on the individual orders.   URINALYSIS W/ CULTURE IF INDICATED   CBC AND DIFFERENTIAL     Narrative:     The following orders were created for panel order CBC & Differential.  Procedure                               Abnormality         Status                     ---------                               -----------         ------                     CBC Auto Differential[[id number]]        Normal              Final result                 Please view results for these tests on the individual orders.       Meds Given in ED:   Medications   sodium chloride 0.9 % flush 10 mL (has no administration in time range)   alteplase (ACTIVASE) 81 mg in sterile water (preservative free) 81 mL (1 mg/mL) infusion (81 mg Intravenous New Bag 7/24/24 1430)   sodium chloride 0.9 % infusion (has no administration in time range)   nitroglycerin (NITROSTAT) SL tablet 0.4 mg (has no administration in time range)   sodium chloride 0.9 % flush 10 mL (has no administration in time range)   sodium chloride 0.9 % flush 10 mL (has no administration in time range)   sodium chloride 0.9 % infusion 40 mL (has no administration in time range)   sennosides-docusate (PERICOLACE) 8.6-50 MG per tablet 2 tablet (has no administration in time range)     And   polyethylene glycol (MIRALAX) packet 17 g (has no administration in time range)     And   bisacodyl (DULCOLAX) EC tablet 5 mg (has no administration in time range)     And   bisacodyl (DULCOLAX) suppository 10 mg (has no administration in time range)   Potassium Replacement - Follow Nurse / BPA Driven Protocol (has no administration in time range)   Magnesium Standard Dose Replacement - Follow Nurse / BPA Driven Protocol (has no administration in time range)   Phosphorus Replacement - Follow Nurse / BPA Driven Protocol (has no administration in time range)   Calcium Replacement - Follow Nurse / BPA Driven Protocol (has no administration in time range)   sodium chloride 0.9 % bolus 1,000 mL (1,000 mL Intravenous New Bag 7/24/24 1407)   ondansetron (ZOFRAN) injection 4 mg (4 mg Intravenous  Given 7/24/24 1406)   iopamidol (ISOVUE-370) 76 % injection 100 mL (120 mL Intravenous Given 7/24/24 1313)   alteplase (ACTIVASE) 9 mg in sterile water (preservative free) 9 mL bolus (9 mg Intravenous Given 7/24/24 1429)           Last NIH score:                                                          Dysphagia screening results:  Patient Factors Component (Dysphagia:Stroke or Rule-out)  Best Eye Response: 4-->(E4) spontaneous (07/24/24 1415)  Best Motor Response: 6-->(M6) obeys commands (07/24/24 1415)  Best Verbal Response: 5-->(V5) oriented (07/24/24 1415)  Percy Coma Scale Score: 15 (07/24/24 1415)     Percy Coma Scale:  No data recorded     CIWA:        Restraint Type:            Isolation Status:  No active isolations

## 2024-07-24 NOTE — CONSULTS
Stroke Consult Note    Patient Name: Kennedy Griffith   MRN: [id number]  Age: 63 y.o.  Sex: male  : 1960    Primary Care Physician: Carline Schmitt APRN  Referring Physician: Dr. Schaefer    TIME STROKE TEAM CALLED: 1255 EST     TIME PATIENT SEEN: 1300 EST    Handedness: Right  Race:      Chief Complaint/Reason for Consultation: dizziness, nausea, ataxic gait    Subjective .  HPI: Kennedy Griffith is a 63-year-old, , right-handed male with known diagnosis of HTN, headaches, history of ureteral stents (s/p removal), HLD, and depression who presents with dizziness, ataxic gait and nausea.  Last known well 2 AM before going to bed.  Patient awoke at 1030 with extreme dizziness and difficulty walking accompanied by severe nausea and vomiting.  The nausea and vomiting did improve slightly but he continued to have dizziness and presented to the ER.  Patient notes that he did have some similar dizziness yesterday morning that resolved after just a few minutes and he was completely back to baseline.  He denies any current headache, denies unilateral weakness, no numbness or tingling, no visual disturbance or speech changes.  No recent medication changes however he did start taking Flexeril a couple of weeks ago.    On initial exam patient is alert and oriented x 3.  His speech is clear and fluent, no aphasia.  Gaze is normal, all visual fields are intact.  Face is symmetrical, tongue protrudes midline.  No drift in any extremity.  Sensation is equal to light touch bilaterally.  Patient is still complaining of dizziness at rest while laying on the CT table.  He says the nausea has improved.  I did attempt to ambulate the patient from the CT table to a gurney; upon standing he was very unsteady and almost fell.  Given his ongoing symptoms concerning for posterior circulation stroke we elected to proceed with hyperacute MRI for possible tPA administration with wake-up protocol.    MRI was reviewed  by myself and Dr. Hinds.  There is a small area of restricted diffusion in the right vinay (image 74 and 75); Dr. Hinds discussed with Dr. Morgan who was in agreement that this likely represents a small acute infarct.  Reviewed imaging findings with patient and family.  Patient is without contraindications to IV thrombolytics, reviewed risks and benefits and he chose to proceed with IV tPA which was administered at 1429.  Of note just prior to tPA administration it was noted that patient had also developed a left facial droop, no other new symptoms.    Last Known Normal Date/Time:  EST     Review of Systems   Constitutional:  Positive for activity change. Negative for fever.   HENT:  Negative for trouble swallowing.    Eyes:  Negative for visual disturbance.   Respiratory:  Negative for cough and shortness of breath.    Cardiovascular:  Positive for chest pain. Negative for palpitations.        Reports occasional chest pain with exertion   Gastrointestinal:  Positive for nausea and vomiting.   Musculoskeletal:  Positive for gait problem.   Neurological:  Positive for dizziness and facial asymmetry. Negative for speech difficulty, weakness, numbness and headaches.   Psychiatric/Behavioral: Negative.        Past Medical History:   Diagnosis Date    Depression     Fatigue     chronic    History of kidney stones     Hyperlipidemia     Sleepiness     Tension headache      Past Surgical History:   Procedure Laterality Date    LIVER BIOPSY      benign    URETEROSCOPY LASER LITHOTRIPSY WITH STENT INSERTION Right 12/16/2022    Procedure: CYSTOSCOPY, RIGHT URETEROSCOPY RETROGRADE WITH STENT INSERTION;  Surgeon: Tej Christine MD;  Location: [facility] OR;  Service: Urology;  Laterality: Right;    URETEROSCOPY LASER LITHOTRIPSY WITH STENT INSERTION Right 12/22/2022    Procedure: URETEROSCOPY LASER LITHOTRIPSY WITH STENT INSERTION RIGHT;  Surgeon: Tej Christine MD;  Location: [facility] OR;  Service: Urology;  Laterality: Right;      Family History   Problem Relation Age of Onset    Other Mother         cardiac arrhythmia/pacer    Arthritis Father     No Known Problems Sister     No Known Problems Brother     Colon cancer Maternal Grandmother     No Known Problems Maternal Grandfather     Heart disease Paternal Grandmother     Coronary artery disease Paternal Grandfather     Lung cancer Paternal Grandfather     Other Daughter         PTSD/suicide    No Known Problems Son     ADD / ADHD Son      Social History     Socioeconomic History    Marital status:    Tobacco Use    Smoking status: Never     Passive exposure: Never    Smokeless tobacco: Never   Vaping Use    Vaping status: Never Used   Substance and Sexual Activity    Alcohol use: Yes     Comment: on rare occasion    Drug use: Never    Sexual activity: Defer     Comment:      No Known Allergies  Prior to Admission medications    Medication Sig Start Date End Date Taking? Authorizing Provider   amphetamine-dextroamphetamine (ADDERALL) 20 MG tablet Take 1 tablet by mouth 3 (Three) Times a Day.  Patient not taking: Reported on 3/4/2024    Faiza Tay MD   buPROPion (WELLBUTRIN) 75 MG tablet Take 1 tablet by mouth Daily. NEEDS REFILL 3/20/23   Faiza Tay MD   busPIRone (BUSPAR) 15 MG tablet Take 1 tablet by mouth 3 (Three) Times a Day. 1/19/21   Carline Schmitt APRN   cyclobenzaprine (FLEXERIL) 10 MG tablet Take 1 tablet by mouth At Night As Needed for Muscle Spasms (muscle spasm, tension). 3/4/24   Carline Schmitt APRN   DULoxetine (CYMBALTA) 60 MG capsule TAKE 1 CAPSULE BY MOUTH EVERY DAY 9/9/22   Carline Schmitt APRN   fexofenadine (Allegra Allergy) 180 MG tablet Take 1 tablet by mouth Daily. 5/9/23   Carline Schmitt APRN   propranolol (INDERAL) 20 MG tablet Take 1 tablet by mouth 2 (Two) Times a Day. 4/20/23   Faiza Tay MD   rosuvastatin (Crestor) 20 MG tablet Take 1 tablet by mouth Daily. 3/14/24   Carline Schmitt  APRN             Objective     Temp:  [97.7 °F (36.5 °C)] 97.7 °F (36.5 °C)  Heart Rate:  [53] 53  Resp:  [16] 16  BP: (169)/(88) 169/88  Neurological Exam  Mental Status  Alert. Oriented to person, place, and time. Speech is normal. Language is fluent with no aphasia. Attention and concentration are normal.    Cranial Nerves  CN II: Visual fields full to confrontation.  CN III, IV, VI: Extraocular movements intact bilaterally. Normal lids and orbits bilaterally. Pupils equal round and reactive to light bilaterally.  CN V: Facial sensation is normal.  CN VII:  Right: There is no facial weakness.  Left: There is central facial weakness.  CN IX, X: Palate elevates symmetrically  CN XII: Tongue midline without atrophy or fasciculations.    Motor  Normal muscle bulk throughout. No fasciculations present. Normal muscle tone. No abnormal involuntary movements. Strength is 5/5 throughout all four extremities.    Sensory  Light touch is normal in upper and lower extremities.     Coordination  Right: Finger-to-nose normal. Heel-to-shin normal.Left: Finger-to-nose normal. Heel-to-shin normal.    Gait   Abnormal gait.Casual gait: Wide stance. Reduced stride length. Ataxic gait.      Physical Exam  Vitals reviewed.   Constitutional:       Appearance: Normal appearance.   HENT:      Head: Normocephalic and atraumatic.   Eyes:      General: Lids are normal.      Extraocular Movements: Extraocular movements intact.      Pupils: Pupils are equal, round, and reactive to light.   Cardiovascular:      Rate and Rhythm: Normal rate.   Pulmonary:      Effort: Pulmonary effort is normal. No respiratory distress.   Musculoskeletal:         General: No swelling. Normal range of motion.      Cervical back: Normal range of motion and neck supple.   Skin:     General: Skin is warm and dry.   Neurological:      Mental Status: He is alert and oriented to person, place, and time.      Cranial Nerves: Cranial nerve deficit present.      Sensory:  No sensory deficit.      Motor: Motor strength is normal.No weakness.      Gait: Gait abnormal.   Psychiatric:         Mood and Affect: Mood normal.         Speech: Speech normal.         Behavior: Behavior normal.         Acute Stroke Data    IV Thrombolytic (TPAInclusion / Exclusion Criteria    Time: 14:15 EDT  Person Administering Scale: JAYLEN Orosco    Inclusion Criteria  [x]   18 years of age or greater   []   Onset of symptoms < 4.5 hours before beginning treatment (stroke onset = time patient was last seen well or without symptoms).   []   Diagnosis of acute ischemic stroke causing measurable disabling deficit (Complete Hemianopia, Any Aphasia, Visual or Sensory Extinction, Any weakness limiting sustained effort against gravity)   []   Any remaining deficit considered potentially disabling in view of patient and practitioner   Exclusion criteria (Do not proceed with Alteplase if any are checked under exclusion criteria)  []   Onset unknown or GREATER than 4.5 hours   []   ICH on CT/MRI   []   CT demonstrates hypodensity representing acute or subacute infarct   []   Significant head trauma or prior stroke in the previous 3 months   []   Symptoms suggestive of subarachnoid hemorrhage   []   History of un-ruptured intracranial aneurysm GREATER than 10 mm   []   Recent intracranial or intraspinal surgery within the last 3 months   []   Arterial puncture at a non-compressible site in the previous 7 days   []   Active internal bleeding   []   Acute bleeding tendency   []   Platelet count LESS than 100,000 for known hematological diseases such as leukemia, thrombocytopenia or chronic cirrhosis   []   Current use of anticoagulant with INR GREATER than 1.7 or PT GREATER than 15 seconds, aPTT GREATER than 40 seconds   []   Heparin received within 48 hours, resulting in abnormally elevated aPTT GREATER than upper limit of normal   []   Current use of direct thrombin inhibitors or direct factor Xa inhibitors in  the past 48 hours   []   Elevated blood pressure refractory to treatment (systolic GREATER than 185 mm/Hg or diastolic  GREATER than 110 mm/Hg   []   Suspected infective endocarditis and aortic arch dissection   []   Current use of therapeutic treatment dose of low-molecular-weight heparin (LMWH) within the previous 24 hours   []   Structural GI malignancy or bleed   Relative exclusion for all patients  []   Only minor nondisabling symptoms   []   Pregnancy   []   Seizure at onset with postictal residual neurological impairments   []   Major surgery or previous trauma within past 14 days   []   History of previous spontaneous ICH, intracranial neoplasm, or AV malformation   []   Postpartum (within previous 14 days)   []   Recent GI or urinary tract hemorrhage (within previous 21 days)   []   Recent acute MI (within previous 3 months)   []   History of unruptured intracranial aneurysm LESS than 10 mm   []   History of ruptured intracranial aneurysm   []   Blood glucose LESS than 50 mg/dL (2.7 mmol/L)   []   Dural puncture within the last 7 days   []   Known GREATER than 10 cerebral microbleeds   Additional exclusions for patients with symptoms onset between 3 and 4.5 hours.  []   Age > 80.   []   On any anticoagulants regardless of INR  >>> Warfarin (Coumadin), Heparin, Enoxaparin (Lovenox), fondaparinux (Arixtra), bivalirudin (Angiomax), Argatroban, dabigatran (Pradaxa), rivaroxaban (Xarelto), or apixaban (Eliquis)   []   Severe stroke (NIHSS > 25).   []   History of BOTH diabetes and previous ischemic stroke.   [x]   The risks and benefits have been discussed with the patient or family related to the administration of IV alteplase for stroke symptoms.   [x]   I have discussed and reviewed the patient's case and imaging with the attending prior to IV Thrombolytic (TPA).   1429 Time Thrombolytic administered   Thrombolytic administration decision making delayed in order to obtain hyperacute MRI under wake-up  protocol.  Hyperacute MRI was reviewed by both myself, Dr. Hinds and Dr. Morgan who agreed there is restricted diffusion in the right vinay not evident on FLAIR imaging making patient a candidate for TPA under the wake-up protocol.    Hospital Meds:  Scheduled- ondansetron, 4 mg, Intravenous, Once  sodium chloride, 1,000 mL, Intravenous, Once      Infusions-     PRNs-   [COMPLETED] Insert Peripheral IV **AND** sodium chloride    Functional Status Prior to Current Stroke/Garfield Score: MRS 0    NIH Stroke Scale  Time: 13:05  EDT  Person Administering Scale: JAYLEN Orosco  Level of consciousness: 0=alert; keenly responsive   1b. LOC questions:  0=Answers both questions correctly   1c. LOC commands: 0=Performs both tasks correctly   2.  Best Gaze: 0=normal   3.  Visual: 0=No visual loss   4. Facial Palsy: 0=Normal symmetric movement   5a.  Motor left arm: 0=No drift, limb holds 90 (or 45) degrees for full 10 seconds   5b.  Motor right arm: 0=No drift, limb holds 90 (or 45) degrees for full 10 seconds   6a. motor left le=No drift, limb holds 90 (or 45) degrees for full 10 seconds   6b  Motor right le=No drift, limb holds 90 (or 45) degrees for full 10 seconds   7. Limb Ataxia: 0=Absent   8.  Sensory: 0=Normal; no sensory loss   9. Best Language:  0=No aphasia, normal   10. Dysarthria: 0=Normal   11. Extinction and Inattention: 0=No abnormality    Total:   0         NIH Stroke Scale  Time: 14:15EDT  Person Administering Scale: JAYLEN Orosco  Level of consciousness: 0=alert; keenly responsive   1b. LOC questions:  0=Performs both tasks correctly   1c. LOC commands: 0=Performs both tasks correctly   2.  Best Gaze: 0=normal   3.  Visual: 0=No visual loss   4. Facial Palsy: 0=Normal symmetric movement   5a.  Motor left arm: 0=No drift, limb holds 90 (or 45) degrees for full 10 seconds   5b.  Motor right arm: 0=No drift, limb holds 90 (or 45) degrees for full 10 seconds   6a. motor left leg:  0=No drift, limb holds 90 (or 45) degrees for full 10 seconds   6b  Motor right le=No drift, limb holds 90 (or 45) degrees for full 10 seconds   7. Limb Ataxia: 0=Absent   8.  Sensory: 0=Normal; no sensory loss   9. Best Language:  0=No aphasia, normal   10. Dysarthria: 0=Normal   11. Extinction and Inattention: 0=No abnormality    Total:   0       Results Reviewed:  I have personally reviewed current lab, radiology, and data and agree with results.  WBC   Date Value Ref Range Status   2024 7.98 3.40 - 10.80 10*3/mm3 Final     RBC   Date Value Ref Range Status   2024 5.35 4.14 - 5.80 10*6/mm3 Final     Hemoglobin   Date Value Ref Range Status   2024 15.6 13.0 - 17.7 g/dL Final   2024 15.3 12.0 - 17.0 g/dL Final     Comment:     Serial Number: [id number]:  [id number]     Hematocrit   Date Value Ref Range Status   2024 46.5 37.5 - 51.0 % Final   2024 45 38 - 51 % Final     MCV   Date Value Ref Range Status   2024 86.9 79.0 - 97.0 fL Final     MCH   Date Value Ref Range Status   2024 29.2 26.6 - 33.0 pg Final     MCHC   Date Value Ref Range Status   2024 33.5 31.5 - 35.7 g/dL Final     RDW   Date Value Ref Range Status   2024 12.6 12.3 - 15.4 % Final     RDW-SD   Date Value Ref Range Status   2024 40.0 37.0 - 54.0 fl Final     MPV   Date Value Ref Range Status   2024 8.9 6.0 - 12.0 fL Final     Platelets   Date Value Ref Range Status   2024 247 140 - 450 10*3/mm3 Final     Neutrophil %   Date Value Ref Range Status   2024 66.7 42.7 - 76.0 % Final     Lymphocyte %   Date Value Ref Range Status   2024 25.3 19.6 - 45.3 % Final     Monocyte %   Date Value Ref Range Status   2024 5.6 5.0 - 12.0 % Final     Eosinophil %   Date Value Ref Range Status   2024 1.5 0.3 - 6.2 % Final     Basophil %   Date Value Ref Range Status   2024 0.5 0.0 - 1.5 % Final     Immature Grans %   Date Value Ref Range Status   2024  0.4 0.0 - 0.5 % Final     Neutrophils, Absolute   Date Value Ref Range Status   07/24/2024 5.32 1.70 - 7.00 10*3/mm3 Final     Lymphocytes, Absolute   Date Value Ref Range Status   07/24/2024 2.02 0.70 - 3.10 10*3/mm3 Final     Monocytes, Absolute   Date Value Ref Range Status   07/24/2024 0.45 0.10 - 0.90 10*3/mm3 Final     Eosinophils, Absolute   Date Value Ref Range Status   07/24/2024 0.12 0.00 - 0.40 10*3/mm3 Final     Basophils, Absolute   Date Value Ref Range Status   07/24/2024 0.04 0.00 - 0.20 10*3/mm3 Final     Immature Grans, Absolute   Date Value Ref Range Status   07/24/2024 0.03 0.00 - 0.05 10*3/mm3 Final     nRBC   Date Value Ref Range Status   07/24/2024 0.0 0.0 - 0.2 /100 WBC Final     Lab Results   Component Value Date    GLUCOSE 133 (H) 07/24/2024    BUN 16 07/24/2024    CREATININE 1.03 07/24/2024    CREATININE 1.00 07/24/2024    EGFR 80.6 07/24/2024    EGFR 83.5 07/24/2024    BCR 15.5 07/24/2024    K 4.1 07/24/2024    CO2 25.0 07/24/2024    CALCIUM 8.8 07/24/2024    ALBUMIN 4.4 07/24/2024    BILITOT 0.4 07/24/2024    AST 38 07/24/2024    ALT 51 (H) 07/24/2024     MRI Brain Without Contrast    Result Date: 7/24/2024  Personally reviewed:  Small area of restricted diffusion in the right vinay (image 74 and 75) not present on flair.  D/w Dr. Hinds and Dr. Morgan.    CT Angiogram Head w AI Analysis of LVO    Result Date: 7/24/2024  Impression: CT perfusion demonstrates no evidence of core infarct or significant territorial ischemic tissue at risk. CT angiogram demonstrates no evidence of flow-limiting stenosis, large vessel occlusion or aneurysm. Electronically Signed: Vik Kennedy MD  7/24/2024 1:41 PM EDT  Workstation ID: [id number]    CT Angiogram Neck    Result Date: 7/24/2024  Impression: CT perfusion demonstrates no evidence of core infarct or significant territorial ischemic tissue at risk. CT angiogram demonstrates no evidence of flow-limiting stenosis, large vessel occlusion or aneurysm.  Electronically Signed: Vik Kennedy MD  7/24/2024 1:41 PM EDT  Workstation ID: [id number]    CT CEREBRAL PERFUSION WITH & WITHOUT CONTRAST    Result Date: 7/24/2024  Impression: CT perfusion demonstrates no evidence of core infarct or significant territorial ischemic tissue at risk. CT angiogram demonstrates no evidence of flow-limiting stenosis, large vessel occlusion or aneurysm. Electronically Signed: Vik Kennedy MD  7/24/2024 1:41 PM EDT  Workstation ID: [id number]    CT Head Without Contrast Stroke Protocol    Result Date: 7/24/2024  Impression: No evidence of acute intracranial disease. Electronically Signed: Dashawn Morgan MD  7/24/2024 1:33 PM EDT  Workstation ID: [id number]     Results for orders placed during the hospital encounter of 03/25/22    Adult Transthoracic Echo Complete W/ Cont if Necessary Per Protocol    Interpretation Summary  · Estimated left ventricular EF = 60%  · The cardiac valves are anatomically and functionally normal.            Assessment/Plan:  63-year-old, , right-handed male with known diagnosis of HTN, headaches, history of ureteral stents (s/p removal), HLD, and depression who presents with dizziness, ataxic gait and nausea.  Last known well 2 AM before going to bed.  Patient awoke at 1030 with extreme dizziness and difficulty walking accompanied by severe nausea and vomiting.  CT head was negative for acute abnormality.  CT perfusion negative.  CTA head and neck negative for flow-limiting stenosis, no LVO or aneurysm.  NIHS initially 0 though given his significant posterior circulation stroke symptoms he underwent a hyperacute MRI which showed a small stroke in the right vinay not evident on FLAIR imaging.  Ultimately patient received tPA (wake-up protocol).  Just prior to receiving tPA he had developed a left facial droop NIH of 1.    PTA antiplatelet: None  PTA anticoagulant: None      Suspected posterior circulation stroke s/p tPA  -Admit to ICU  -Ischemic stroke order  set with thrombolytic therapy  -24-hour post tPA CT head tomorrow at 1430  -Post tPA precautions  -Aspirin 325 mg tomorrow if 24-hour CT head is negative for hemorrhage  -Atorvastatin 80 mg daily  -BP control; SBP <180, DBP <105; titrate Cardene as needed  -TTE with bubble  -PT/OT/SLP evals  -N.p.o. until cleared by speech  -Stroke neurology will continue to follow      Plan of care was discussed with patient, family, nursing, Dr. Hinds, Dr. Morgan, and Dr. Schaefer.  Thank you for including us in the care of this patient.      Dipti Weller, APRN  July 24, 2024  13:38 EDT

## 2024-07-24 NOTE — CASE MANAGEMENT/SOCIAL WORK
Discharge Planning Assessment  [facility]     Patient Name: Kennedy Griffith  MRN: [id number]  Today's Date: 7/24/2024    Admit Date: 7/24/2024    Plan: Home   Discharge Needs Assessment       Row Name 07/24/24 1508       Living Environment    People in Home spouse    Current Living Arrangements home    Primary Care Provided by self    Provides Primary Care For no one    Family Caregiver if Needed spouse    Family Caregiver Names Paulina Griffith, wife    Quality of Family Relationships helpful;involved;supportive    Able to Return to Prior Arrangements yes       Resource/Environmental Concerns    Resource/Environmental Concerns none       Transition Planning    Patient/Family Anticipates Transition to home with family       Discharge Needs Assessment    Equipment Currently Used at Home none    Concerns to be Addressed denies needs/concerns at this time    Equipment Needed After Discharge none    Discharge Coordination/Progress Lives in a house in [place] [facility] with wife, independent with ADLs. No DME, history of home health or advanced directives.                   Discharge Plan       Row Name 07/24/24 1510       Plan    Plan Home    Patient/Family in Agreement with Plan yes    Plan Comments I spoke with Mr Griffith at bedside.  Mr Griffith resides in a house in [place] [facility] with his wife Paulina and is independent with ADLs.  He does not use any DME, has not had home health in the past, and does not have any advanced directives.  His insurance is [employer] and he denies any issues or lapses in coverage.  His insurance helps to cover prescriptions.  His PCP is Carline Schmitt, and he gets his prescriptions filled at [facility] off of [address].  At this time, he denies any discharge needs.    Final Discharge Disposition Code 01 - home or self-care                  Continued Care and Services - Admitted Since 7/24/2024    No active coordination exists for this encounter.          Demographic Summary    No  documentation.                  Functional Status       Row Name 07/24/24 1508       Functional Status    Usual Activity Tolerance good    Current Activity Tolerance good       Functional Status, IADL    Medications independent    Meal Preparation independent    Housekeeping independent    Laundry independent    Shopping independent                   Psychosocial    No documentation.                  Abuse/Neglect    No documentation.                  Legal    No documentation.                  Substance Abuse    No documentation.                  Patient Forms    No documentation.                     Elsy Conte RN

## 2024-07-24 NOTE — H&P
"INTENSIVIST   H&P     Hospital:  LOS: 0 days     Chief Complaint: Syncope, dizziness    Subjective   S     HPI: Kennedy Griffith is a 63-year-old male with past medical history of hypertension, chronic tension headaches, dyslipidemia, mood disorder. Patient woke up this morning with dizziness, nausea and unsteady gait. Presented to the ED later today as symptoms had shown no improvement. CT head was negative for acute intracranial abnormality. CT perfusion negative. CTA head and neck negative for flow-limiting stenosis, no LVO or aneurysm. NIHS initially 0 though given his significant posterior circulation stroke symptoms he underwent a hyperacute MRI which showed a small stroke in the right vinay not evident on FLAIR imaging. Patient ultimately underwent tPA and subsequently admitted to the ICU for ongoing management observation.    The patient's relevant past medical, surgical and social history were reviewed and updated in Epic as appropriate.      Objective   O     Intake/Ouptut 24 hrs (7:00AM - 6:59 AM)  Intake & Output (last 3 days)         07/21 0701  07/22 0700 07/22 0701  07/23 0700 07/23 0701  07/24 0700 07/24 0701  07/25 0700    IV Piggyback    1000    Total Intake(mL/kg)    1000 (9.7)    Net    +1000             Medications (drips):  niCARdipine    Respiratory Support: Room air    Physical Examination:  Vital Signs: Blood pressure 157/80, pulse 61, temperature 97.7 °F (36.5 °C), temperature source Oral, resp. rate 16, height 180.3 cm (71\"), weight 103 kg (228 lb), SpO2 97%.    General: The patient appears in no acute distress. Alert, cooperative and interactive.  Chest: Clear to auscultation bilaterally, No wheezing, rhonchi, or rales. Normal work of breathing. Equal chest rise.  Cardiac: Regular rhythm, normal rate, S1S2 auscultated. No murmurs, rubs or gallops.   Abdomen: Soft, non-tender, non-distended, positive bowel sounds in all four quadrants.   Extremities: No lower extremity edema. No clubbing or " cyanosis.  Skin: No rashes, open wounds, or bruising. Warm, dry, well-perfused.  Neuro: Motor power grossly intact bilaterally. Sensation intact. Speech fluid and fluent. Thought process coherent.  Psych: Alert and oriented x3. Mood stable.    Lines, Drains & Airways       Active LDAs       Name Placement date Placement time Site Days    Peripheral IV 07/24/24 1306 Right Antecubital 07/24/24  1306  Antecubital  less than 1        Results from last 7 days   Lab Units 07/24/24  1305   WBC 10*3/mm3 7.98   HEMOGLOBIN g/dL 15.6   HEMOGLOBIN, POC g/dL 15.3   MCV fL 86.9   PLATELETS 10*3/mm3 247     Results from last 7 days   Lab Units 07/24/24  1305   SODIUM mmol/L 139   POTASSIUM mmol/L 4.1   CO2 mmol/L 25.0   CREATININE mg/dL 1.00  1.03   GLUCOSE mg/dL 133*     Estimated Creatinine Clearance: 90 mL/min (by C-G formula based on SCr of 1 mg/dL).  Results from last 7 days   Lab Units 07/24/24  1305   ALK PHOS U/L 77   BILIRUBIN mg/dL 0.4   ALT (SGPT) U/L 51*   AST (SGOT) U/L 38     Images:  XR Chest 1 View    Result Date: 7/24/2024  Impression: No new chest disease. Electronically Signed: Dashawn Morgan MD  7/24/2024 2:29 PM EDT  Workstation ID: [id number]    MRI Brain Without Contrast    Result Date: 7/24/2024  Impression: No acute infarct. Electronically Signed: Raphael Cam MD  7/24/2024 2:14 PM EDT  Workstation ID: [id number]    CT Angiogram Head w AI Analysis of LVO    Result Date: 7/24/2024  Impression: CT perfusion demonstrates no evidence of core infarct or significant territorial ischemic tissue at risk. CT angiogram demonstrates no evidence of flow-limiting stenosis, large vessel occlusion or aneurysm. Electronically Signed: Vik Kennedy MD  7/24/2024 1:41 PM EDT  Workstation ID: [id number]    CT Angiogram Neck    Result Date: 7/24/2024  Impression: CT perfusion demonstrates no evidence of core infarct or significant territorial ischemic tissue at risk. CT angiogram demonstrates no evidence of flow-limiting stenosis,  large vessel occlusion or aneurysm. Electronically Signed: Vik Kennedy MD  7/24/2024 1:41 PM EDT  Workstation ID: [id number]    CT CEREBRAL PERFUSION WITH & WITHOUT CONTRAST    Result Date: 7/24/2024  Impression: CT perfusion demonstrates no evidence of core infarct or significant territorial ischemic tissue at risk. CT angiogram demonstrates no evidence of flow-limiting stenosis, large vessel occlusion or aneurysm. Electronically Signed: Vik Kennedy MD  7/24/2024 1:41 PM EDT  Workstation ID: [id number]    CT Head Without Contrast Stroke Protocol    Result Date: 7/24/2024  Impression: No evidence of acute intracranial disease. Electronically Signed: Dashawn Morgan MD  7/24/2024 1:33 PM EDT  Workstation ID: [id number]     Results: Reviewed.  - I reviewed the patient's new laboratory and imaging results.  - I independently reviewed the patient's new images.    Medications: Reviewed.    Assessment & Plan    A / P     Active Hospital Problems:  Active Hospital Problems    Diagnosis  POA    **Stroke [I63.9]  Yes      Resolved Hospital Problems   No resolved problems to display.     -Admit patient to the ICU  -Initiate CVA with TPA protocol  -Aspirin/statin  -Neurology consulted for management of CVA   -Nicardipine for blood pressure control if needed  -Sliding scale insulin as needed for blood sugar control, A1c pending  -PT/OT/ST  -SCDs for DVT prophylaxis  -Pepcid for GI prophylaxis  -AM labs    High level of risk due to severe exacerbation of chronic illness and illness with threat to life or bodily function.    I conducted multidisciplinary rounds in the plan of care was discussed with the multidisciplinary team at that time. In attendance at multidisciplinary rounds was clinical pharmacist, dietitian, nursing staff and case management.    I discussed the patient's findings and my recommendations with patient, nursing staff, and consulting provider.      -- Sreekanth Martin MD  Pulmonary/Critical Care

## 2024-07-24 NOTE — PROGRESS NOTES
Code Stroke: Wake-Up Stroke  Patient presented with stroke-like symptoms to [facility] ED. Patient endorses going to sleep around 02:30 am and waking up at 10:30 am with dizziness. Due to unknown time of symptom onset, Neuro-Stroke services have utilized imaging-based criteria for evaluation of Wake-Up Stroke. The subsequent MRI perfusion scan was reviewed by Stroke Navigator (JAYLEN Bernard) and Neurology (Dr Hinds) who dictated that there is a small area of restricted diffusion in the right vinay and that this likely represents a small acute infarct.    No significant PMH or evidence of acute/chronic anticoagulation upon chart review; patient is without contraindications to IV thrombolytics. Patient was counseled about the risks and benefits of the thrombolytic: IV tPA (alteplase). Patient and spouse at bedside verbalized understanding and he chose to proceed with the medication. Details provided below.     LKW: 02:00 am before going to sleep.  NIHSS: 0  BP: 138/76  B    TBW: 103 kg  TBW x 0.9 mg/kg = 92.7 mg (Max dose = 90 mg)    Patient weight ?100 k mg (10% of 90 mg) as an IV bolus over 1 minute [administered at 14:29], followed by..  81 mg (90% of 90 mg) as a continuous infusion over 60 minutes [started at 14:30], followed by..  100 mL 0.9% sodium chloride infusion is ordered to be administered immediately after introvenous fibrinolytic, starting at 15:30 today.    I was involved in the procurement, preparation, and administration of time sensitive therapies. All medications were prepared at the bedside using aseptic technique.     Jamie Porter, Dayan, BCPS, BCCP  16:13 EDT   24

## 2024-07-25 ENCOUNTER — APPOINTMENT (OUTPATIENT)
Dept: CT IMAGING | Facility: HOSPITAL | Age: 64
DRG: 063 | End: 2024-07-25
Payer: COMMERCIAL

## 2024-07-25 ENCOUNTER — APPOINTMENT (OUTPATIENT)
Dept: CARDIOLOGY | Facility: HOSPITAL | Age: 64
DRG: 063 | End: 2024-07-25
Payer: COMMERCIAL

## 2024-07-25 LAB
BH CV ECHO MEAS - AO MAX PG: 10.9 MMHG
BH CV ECHO MEAS - AO MEAN PG: 6 MMHG
BH CV ECHO MEAS - AO ROOT DIAM: 3.3 CM
BH CV ECHO MEAS - AO V2 MAX: 165 CM/SEC
BH CV ECHO MEAS - AO V2 VTI: 37.7 CM
BH CV ECHO MEAS - AVA(I,D): 3.1 CM2
BH CV ECHO MEAS - EDV(CUBED): 110.6 ML
BH CV ECHO MEAS - EDV(MOD-SP2): 90.8 ML
BH CV ECHO MEAS - EDV(MOD-SP4): 107 ML
BH CV ECHO MEAS - EF(MOD-BP): 64.9 %
BH CV ECHO MEAS - EF(MOD-SP2): 65.1 %
BH CV ECHO MEAS - EF(MOD-SP4): 67.5 %
BH CV ECHO MEAS - ESV(CUBED): 27 ML
BH CV ECHO MEAS - ESV(MOD-SP2): 31.7 ML
BH CV ECHO MEAS - ESV(MOD-SP4): 34.8 ML
BH CV ECHO MEAS - FS: 37.5 %
BH CV ECHO MEAS - IVS/LVPW: 1 CM
BH CV ECHO MEAS - IVSD: 1.1 CM
BH CV ECHO MEAS - LA DIMENSION: 4.4 CM
BH CV ECHO MEAS - LAT PEAK E' VEL: 9.8 CM/SEC
BH CV ECHO MEAS - LV MASS(C)D: 194 GRAMS
BH CV ECHO MEAS - LV MAX PG: 8.2 MMHG
BH CV ECHO MEAS - LV MEAN PG: 4.5 MMHG
BH CV ECHO MEAS - LV V1 MAX: 143 CM/SEC
BH CV ECHO MEAS - LV V1 VTI: 33.4 CM
BH CV ECHO MEAS - LVIDD: 4.8 CM
BH CV ECHO MEAS - LVIDS: 3 CM
BH CV ECHO MEAS - LVOT AREA: 3.5 CM2
BH CV ECHO MEAS - LVOT DIAM: 2.1 CM
BH CV ECHO MEAS - LVPWD: 1.1 CM
BH CV ECHO MEAS - MED PEAK E' VEL: 8.5 CM/SEC
BH CV ECHO MEAS - MR MAX PG: 36.2 MMHG
BH CV ECHO MEAS - MR MAX VEL: 301 CM/SEC
BH CV ECHO MEAS - MV A MAX VEL: 96.4 CM/SEC
BH CV ECHO MEAS - MV DEC SLOPE: 577 CM/SEC2
BH CV ECHO MEAS - MV DEC TIME: 0.25 SEC
BH CV ECHO MEAS - MV E MAX VEL: 73.9 CM/SEC
BH CV ECHO MEAS - MV E/A: 0.77
BH CV ECHO MEAS - MV MAX PG: 5.2 MMHG
BH CV ECHO MEAS - MV MEAN PG: 2 MMHG
BH CV ECHO MEAS - MV P1/2T: 57.4 MSEC
BH CV ECHO MEAS - MV V2 VTI: 32 CM
BH CV ECHO MEAS - MVA(P1/2T): 3.8 CM2
BH CV ECHO MEAS - MVA(VTI): 3.6 CM2
BH CV ECHO MEAS - PA ACC TIME: 0.08 SEC
BH CV ECHO MEAS - PA V2 MAX: 91.4 CM/SEC
BH CV ECHO MEAS - RAP SYSTOLE: 3 MMHG
BH CV ECHO MEAS - RVSP: 18 MMHG
BH CV ECHO MEAS - SV(LVOT): 115.7 ML
BH CV ECHO MEAS - SV(MOD-SP2): 59.1 ML
BH CV ECHO MEAS - SV(MOD-SP4): 72.2 ML
BH CV ECHO MEAS - TAPSE (>1.6): 1.98 CM
BH CV ECHO MEAS - TR MAX PG: 15 MMHG
BH CV ECHO MEAS - TR MAX VEL: 193 CM/SEC
BH CV ECHO MEASUREMENTS AVERAGE E/E' RATIO: 8.08
BH CV ECHO SHUNT ASSESSMENT PERFORMED (HIDDEN SCRIPTING): 1
BH CV XLRA - RV BASE: 3.5 CM
BH CV XLRA - RV LENGTH: 8.4 CM
BH CV XLRA - RV MID: 3.2 CM
BH CV XLRA - TDI S': 11.3 CM/SEC
CHOLEST SERPL-MCNC: 231 MG/DL (ref 0–200)
GLUCOSE BLDC GLUCOMTR-MCNC: 110 MG/DL (ref 70–130)
HBA1C MFR BLD: 5.7 % (ref 4.8–5.6)
HDLC SERPL-MCNC: 44 MG/DL (ref 40–60)
LDLC SERPL CALC-MCNC: 165 MG/DL (ref 0–100)
LDLC/HDLC SERPL: 3.7 {RATIO}
LEFT ATRIUM VOLUME INDEX: 25.2 ML/M2
LV EF 2D ECHO EST: 60 %
TRIGL SERPL-MCNC: 120 MG/DL (ref 0–150)
VLDLC SERPL-MCNC: 22 MG/DL (ref 5–40)

## 2024-07-25 PROCEDURE — 70450 CT HEAD/BRAIN W/O DYE: CPT

## 2024-07-25 PROCEDURE — 80061 LIPID PANEL: CPT | Performed by: NURSE PRACTITIONER

## 2024-07-25 PROCEDURE — 97165 OT EVAL LOW COMPLEX 30 MIN: CPT

## 2024-07-25 PROCEDURE — 99233 SBSQ HOSP IP/OBS HIGH 50: CPT | Performed by: STUDENT IN AN ORGANIZED HEALTH CARE EDUCATION/TRAINING PROGRAM

## 2024-07-25 PROCEDURE — 92610 EVALUATE SWALLOWING FUNCTION: CPT

## 2024-07-25 PROCEDURE — 97162 PT EVAL MOD COMPLEX 30 MIN: CPT

## 2024-07-25 PROCEDURE — 97530 THERAPEUTIC ACTIVITIES: CPT

## 2024-07-25 PROCEDURE — 83036 HEMOGLOBIN GLYCOSYLATED A1C: CPT | Performed by: NURSE PRACTITIONER

## 2024-07-25 PROCEDURE — 93306 TTE W/DOPPLER COMPLETE: CPT

## 2024-07-25 PROCEDURE — 92523 SPEECH SOUND LANG COMPREHEN: CPT

## 2024-07-25 PROCEDURE — 99232 SBSQ HOSP IP/OBS MODERATE 35: CPT | Performed by: INTERNAL MEDICINE

## 2024-07-25 PROCEDURE — 93306 TTE W/DOPPLER COMPLETE: CPT | Performed by: INTERNAL MEDICINE

## 2024-07-25 PROCEDURE — 82948 REAGENT STRIP/BLOOD GLUCOSE: CPT

## 2024-07-25 PROCEDURE — 25010000002 ONDANSETRON PER 1 MG

## 2024-07-25 RX ORDER — PANTOPRAZOLE SODIUM 40 MG/1
40 TABLET, DELAYED RELEASE ORAL
Status: DISCONTINUED | OUTPATIENT
Start: 2024-07-25 | End: 2024-07-26

## 2024-07-25 RX ORDER — PANTOPRAZOLE SODIUM 40 MG/1
40 TABLET, DELAYED RELEASE ORAL
Status: DISCONTINUED | OUTPATIENT
Start: 2024-07-26 | End: 2024-07-25

## 2024-07-25 RX ADMIN — ASPIRIN 325 MG: 325 TABLET ORAL at 17:32

## 2024-07-25 RX ADMIN — Medication 10 ML: at 20:37

## 2024-07-25 RX ADMIN — ONDANSETRON 4 MG: 2 INJECTION INTRAMUSCULAR; INTRAVENOUS at 00:25

## 2024-07-25 RX ADMIN — Medication 10 ML: at 08:52

## 2024-07-25 RX ADMIN — ACETAMINOPHEN 650 MG: 325 TABLET ORAL at 15:26

## 2024-07-25 RX ADMIN — ACETAMINOPHEN 650 MG: 325 TABLET ORAL at 08:51

## 2024-07-25 RX ADMIN — SENNOSIDES AND DOCUSATE SODIUM 2 TABLET: 50; 8.6 TABLET ORAL at 08:51

## 2024-07-25 RX ADMIN — MECLIZINE HYDROCHLORIDE 25 MG: 25 TABLET ORAL at 15:26

## 2024-07-25 RX ADMIN — ATORVASTATIN CALCIUM 80 MG: 40 TABLET, FILM COATED ORAL at 20:35

## 2024-07-25 RX ADMIN — ONDANSETRON 4 MG: 2 INJECTION INTRAMUSCULAR; INTRAVENOUS at 15:26

## 2024-07-25 RX ADMIN — MECLIZINE HYDROCHLORIDE 25 MG: 25 TABLET ORAL at 07:08

## 2024-07-25 RX ADMIN — PANTOPRAZOLE SODIUM 40 MG: 40 TABLET, DELAYED RELEASE ORAL at 15:26

## 2024-07-25 NOTE — CASE MANAGEMENT/SOCIAL WORK
Continued Stay Note  [facility]     Patient Name: Kennedy Griffith  MRN: [id number]  Today's Date: 7/25/2024    Admit Date: 7/24/2024    Plan: [facility] acute   Discharge Plan       Row Name 07/25/24 1341       Plan    Plan [facility] acute    Plan Comments Spoke with patient at bedside to discuss disposition and therapy recommendations for IRF.  Patient is agreeable to [facility] acute rehab; referral given to Brisa.   will continue to follow.      Final Discharge Disposition Code 62 - inpatient rehab facility                   Discharge Codes    No documentation.                       Tamera Krueger RN

## 2024-07-25 NOTE — PAYOR COMM NOTE
"Auth# [id number]   Clinicals    Utilization Review  Phone [phone]  Fax [phone]    [place] [address]         Sherry Carlisle (63 y.o. Male)       Date of Birth   1960    Social Security Number       Address   [address] [place] [address]    Home Phone   [phone]    MRN   [id number]       Jainism   None    Marital Status                               Admission Date   7/24/24    Admission Type   Emergency    Admitting Provider   Rico Martin MD    Attending Provider   Rico Martin MD    Department, Room/Bed   [facility] 2B ICU, N229/1       Discharge Date       Discharge Disposition       Discharge Destination                                 Attending Provider: Rico Martin MD    Allergies: No Known Allergies    Isolation: None   Infection: None   Code Status: Not on file    Ht: 180.3 cm (70.98\")   Wt: 103 kg (227 lb 1.2 oz)    Admission Cmt: None   Principal Problem: Stroke [I63.9]                   Active Insurance as of 7/24/2024       Primary Coverage       Payor Plan Insurance Group Employer/Plan Group    CIGNA CIGNA [id number]       Payor Plan Address Payor Plan Phone Number Payor Plan Fax Number Effective Dates    [address] [phone]  1/1/2021 - None Entered    [facility] [place] [address]         Subscriber Name Subscriber Birth Date Member ID       SHERRY CARLISLE 1960 [id number]                     Emergency Contacts        (Rel.) Home Phone Work Phone Mobile Phone    BERTIN CARLISLE (Spouse) [phone] -- [phone]              [place]: [facility] [id number] Tax ID [id number]  Insurance Information                  CIGNA/CIGNA Phone: [phone]    Subscriber: Sherry Carlisle Subscriber#: [id number]    Group#: [id number] Precert#: [id number]             History & Physical        Rico Martin MD at 07/24/24 1551            INTENSIVIST   H&P     Hospital: " " LOS: 0 days     Chief Complaint: Syncope, dizziness    Subjective  S     HPI: Kennedy Griffith is a 63-year-old male with past medical history of hypertension, chronic tension headaches, dyslipidemia, mood disorder. Patient woke up this morning with dizziness, nausea and unsteady gait. Presented to the ED later today as symptoms had shown no improvement. CT head was negative for acute intracranial abnormality. CT perfusion negative. CTA head and neck negative for flow-limiting stenosis, no LVO or aneurysm. NIHS initially 0 though given his significant posterior circulation stroke symptoms he underwent a hyperacute MRI which showed a small stroke in the right vinay not evident on FLAIR imaging. Patient ultimately underwent tPA and subsequently admitted to the ICU for ongoing management observation.    The patient's relevant past medical, surgical and social history were reviewed and updated in Epic as appropriate.      Objective  O     Intake/Ouptut 24 hrs (7:00AM - 6:59 AM)  Intake & Output (last 3 days)         07/21 0701  07/22 0700 07/22 0701  07/23 0700 07/23 0701  07/24 0700 07/24 0701  07/25 0700    IV Piggyback    1000    Total Intake(mL/kg)    1000 (9.7)    Net    +1000             Medications (drips):  niCARdipine    Respiratory Support: Room air    Physical Examination:  Vital Signs: Blood pressure 157/80, pulse 61, temperature 97.7 °F (36.5 °C), temperature source Oral, resp. rate 16, height 180.3 cm (71\"), weight 103 kg (228 lb), SpO2 97%.    General: The patient appears in no acute distress. Alert, cooperative and interactive.  Chest: Clear to auscultation bilaterally, No wheezing, rhonchi, or rales. Normal work of breathing. Equal chest rise.  Cardiac: Regular rhythm, normal rate, S1S2 auscultated. No murmurs, rubs or gallops.   Abdomen: Soft, non-tender, non-distended, positive bowel sounds in all four quadrants.   Extremities: No lower extremity edema. No clubbing or cyanosis.  Skin: No rashes, open " wounds, or bruising. Warm, dry, well-perfused.  Neuro: Motor power grossly intact bilaterally. Sensation intact. Speech fluid and fluent. Thought process coherent.  Psych: Alert and oriented x3. Mood stable.    Lines, Drains & Airways       Active LDAs       Name Placement date Placement time Site Days    Peripheral IV 07/24/24 1306 Right Antecubital 07/24/24  1306  Antecubital  less than 1        Results from last 7 days   Lab Units 07/24/24  1305   WBC 10*3/mm3 7.98   HEMOGLOBIN g/dL 15.6   HEMOGLOBIN, POC g/dL 15.3   MCV fL 86.9   PLATELETS 10*3/mm3 247     Results from last 7 days   Lab Units 07/24/24  1305   SODIUM mmol/L 139   POTASSIUM mmol/L 4.1   CO2 mmol/L 25.0   CREATININE mg/dL 1.00  1.03   GLUCOSE mg/dL 133*     Estimated Creatinine Clearance: 90 mL/min (by C-G formula based on SCr of 1 mg/dL).  Results from last 7 days   Lab Units 07/24/24  1305   ALK PHOS U/L 77   BILIRUBIN mg/dL 0.4   ALT (SGPT) U/L 51*   AST (SGOT) U/L 38     Images:  XR Chest 1 View    Result Date: 7/24/2024  Impression: No new chest disease. Electronically Signed: Dashawn Morgan MD  7/24/2024 2:29 PM EDT  Workstation ID: [id number]    MRI Brain Without Contrast    Result Date: 7/24/2024  Impression: No acute infarct. Electronically Signed: Raphael Cam MD  7/24/2024 2:14 PM EDT  Workstation ID: [id number]    CT Angiogram Head w AI Analysis of LVO    Result Date: 7/24/2024  Impression: CT perfusion demonstrates no evidence of core infarct or significant territorial ischemic tissue at risk. CT angiogram demonstrates no evidence of flow-limiting stenosis, large vessel occlusion or aneurysm. Electronically Signed: Vik Kennedy MD  7/24/2024 1:41 PM EDT  Workstation ID: [id number]    CT Angiogram Neck    Result Date: 7/24/2024  Impression: CT perfusion demonstrates no evidence of core infarct or significant territorial ischemic tissue at risk. CT angiogram demonstrates no evidence of flow-limiting stenosis, large vessel occlusion or  aneurysm. Electronically Signed: Vik Kennedy MD  7/24/2024 1:41 PM EDT  Workstation ID: [id number]    CT CEREBRAL PERFUSION WITH & WITHOUT CONTRAST    Result Date: 7/24/2024  Impression: CT perfusion demonstrates no evidence of core infarct or significant territorial ischemic tissue at risk. CT angiogram demonstrates no evidence of flow-limiting stenosis, large vessel occlusion or aneurysm. Electronically Signed: Vik Kennedy MD  7/24/2024 1:41 PM EDT  Workstation ID: [id number]    CT Head Without Contrast Stroke Protocol    Result Date: 7/24/2024  Impression: No evidence of acute intracranial disease. Electronically Signed: Dashawn Morgan MD  7/24/2024 1:33 PM EDT  Workstation ID: [id number]     Results: Reviewed.  - I reviewed the patient's new laboratory and imaging results.  - I independently reviewed the patient's new images.    Medications: Reviewed.    Assessment & Plan   A / P     Active Hospital Problems:  Active Hospital Problems    Diagnosis  POA    **Stroke [I63.9]  Yes      Resolved Hospital Problems   No resolved problems to display.     -Admit patient to the ICU  -Initiate CVA with TPA protocol  -Aspirin/statin  -Neurology consulted for management of CVA   -Nicardipine for blood pressure control if needed  -Sliding scale insulin as needed for blood sugar control, A1c pending  -PT/OT/ST  -SCDs for DVT prophylaxis  -Pepcid for GI prophylaxis  -AM labs    High level of risk due to severe exacerbation of chronic illness and illness with threat to life or bodily function.    I conducted multidisciplinary rounds in the plan of care was discussed with the multidisciplinary team at that time. In attendance at multidisciplinary rounds was clinical pharmacist, dietitian, nursing staff and case management.    I discussed the patient's findings and my recommendations with patient, nursing staff, and consulting provider.      -- Sreekanth Martin MD  Pulmonary/Critical Care    Electronically signed by Rico Martin MD at  07/24/24 1920          Emergency Department Notes        Sharda Villela RN at 07/24/24 1457           [facility]    Nursing Report ED to Floor:  Mental status: A+Ox4  Ambulatory status: Bedfast post TPA  Oxygen Therapy:  RA  Cardiac Rhythm: NSR  Admitted from: Home  Safety Concerns:  Bleed/Falls  Social Issues: N/A  ED Room #:  03    ED Nurse Phone Extension - [phone] or may call [phone].      HPI:   Chief Complaint   Patient presents with    Dizziness       Past Medical History:  Past Medical History:   Diagnosis Date    Depression     Fatigue     chronic    History of kidney stones     Hyperlipidemia     Sleepiness     Tension headache         Past Surgical History:  Past Surgical History:   Procedure Laterality Date    LIVER BIOPSY      benign    URETEROSCOPY LASER LITHOTRIPSY WITH STENT INSERTION Right 12/16/2022    Procedure: CYSTOSCOPY, RIGHT URETEROSCOPY RETROGRADE WITH STENT INSERTION;  Surgeon: Tej Christine MD;  Location: [facility] OR;  Service: Urology;  Laterality: Right;    URETEROSCOPY LASER LITHOTRIPSY WITH STENT INSERTION Right 12/22/2022    Procedure: URETEROSCOPY LASER LITHOTRIPSY WITH STENT INSERTION RIGHT;  Surgeon: Tej Christine MD;  Location: [facility] OR;  Service: Urology;  Laterality: Right;        Admitting Doctor:   Rico Martin MD    Consulting Provider(s):  Consults       No orders found from 6/25/2024 to 7/25/2024.             Admitting Diagnosis:   The primary encounter diagnosis was Dizziness. Diagnoses of Acute stroke due to ischemia and Thrombolytic medication administered within last 5 days were also pertinent to this visit.    Most Recent Vitals:   Vitals:    07/24/24 1421 07/24/24 1429 07/24/24 1430 07/24/24 1435   BP: 126/74 126/74 135/80 138/76   BP Location:       Patient Position:       Pulse: 66 65 65 67   Resp:  17     Temp:       TempSrc:       SpO2: 98% 96% 96% 96%   Weight:       Height:           Active LDAs/IV Access:   Lines, Drains & Airways       Active LDAs        Name Placement date Placement time Site Days    Peripheral IV 07/24/24 1306 Right Antecubital 07/24/24  1306  Antecubital  less than 1                    Labs (abnormal labs have a star):   Labs Reviewed   COMPREHENSIVE METABOLIC PANEL - Abnormal; Notable for the following components:       Result Value    Glucose 133 (*)     ALT (SGPT) 51 (*)     All other components within normal limits    Narrative:     GFR Normal >60  Chronic Kidney Disease <60  Kidney Failure <15     POCT CHEM 8 - Abnormal; Notable for the following components:    Glucose 134 (*)     All other components within normal limits   COVID-19 AND FLU A/B, NP SWAB IN TRANSPORT MEDIA 1 HR TAT - Normal    Narrative:     Fact sheet for providers: https://www.fda.gov/media/667555/download    Fact sheet for patients: https://www.fda.gov/media/002916/download    Test performed by PCR.   PROTIME-INR - Normal   SINGLE HS TROPONIN T - Normal    Narrative:     High Sensitive Troponin T Reference Range:  <14.0 ng/L- Negative Female for AMI  <22.0 ng/L- Negative Male for AMI  >=14 - Abnormal Female indicating possible myocardial injury.  >=22 - Abnormal Male indicating possible myocardial injury.   Clinicians would have to utilize clinical acumen, EKG, Troponin, and serial changes to determine if it is an Acute Myocardial Infarction or myocardial injury due to an underlying chronic condition.        LACTIC ACID, PLASMA - Normal   CBC WITH AUTO DIFFERENTIAL - Normal   COVID PRE-OP / PRE-PROCEDURE SCREENING ORDER (NO ISOLATION)    Narrative:     The following orders were created for panel order COVID PRE-OP / PRE-PROCEDURE SCREENING ORDER (NO ISOLATION) - Swab, Nasopharynx.  Procedure                               Abnormality         Status                     ---------                               -----------         ------                     COVID-19 and FLU A/B PCR...[[id number]]  Normal              Final result                 Please view results for  these tests on the individual orders.   URINALYSIS W/ CULTURE IF INDICATED   CBC AND DIFFERENTIAL    Narrative:     The following orders were created for panel order CBC & Differential.  Procedure                               Abnormality         Status                     ---------                               -----------         ------                     CBC Auto Differential[[id number]]        Normal              Final result                 Please view results for these tests on the individual orders.       Meds Given in ED:   Medications   sodium chloride 0.9 % flush 10 mL (has no administration in time range)   alteplase (ACTIVASE) 81 mg in sterile water (preservative free) 81 mL (1 mg/mL) infusion (81 mg Intravenous New Bag 7/24/24 1430)   sodium chloride 0.9 % infusion (has no administration in time range)   nitroglycerin (NITROSTAT) SL tablet 0.4 mg (has no administration in time range)   sodium chloride 0.9 % flush 10 mL (has no administration in time range)   sodium chloride 0.9 % flush 10 mL (has no administration in time range)   sodium chloride 0.9 % infusion 40 mL (has no administration in time range)   sennosides-docusate (PERICOLACE) 8.6-50 MG per tablet 2 tablet (has no administration in time range)     And   polyethylene glycol (MIRALAX) packet 17 g (has no administration in time range)     And   bisacodyl (DULCOLAX) EC tablet 5 mg (has no administration in time range)     And   bisacodyl (DULCOLAX) suppository 10 mg (has no administration in time range)   Potassium Replacement - Follow Nurse / BPA Driven Protocol (has no administration in time range)   Magnesium Standard Dose Replacement - Follow Nurse / BPA Driven Protocol (has no administration in time range)   Phosphorus Replacement - Follow Nurse / BPA Driven Protocol (has no administration in time range)   Calcium Replacement - Follow Nurse / BPA Driven Protocol (has no administration in time range)   sodium chloride 0.9 % bolus 1,000 mL  (1,000 mL Intravenous New Bag 7/24/24 1407)   ondansetron (ZOFRAN) injection 4 mg (4 mg Intravenous Given 7/24/24 1406)   iopamidol (ISOVUE-370) 76 % injection 100 mL (120 mL Intravenous Given 7/24/24 1313)   alteplase (ACTIVASE) 9 mg in sterile water (preservative free) 9 mL bolus (9 mg Intravenous Given 7/24/24 1429)           Last NIH score:                                                          Dysphagia screening results:  Patient Factors Component (Dysphagia:Stroke or Rule-out)  Best Eye Response: 4-->(E4) spontaneous (07/24/24 1415)  Best Motor Response: 6-->(M6) obeys commands (07/24/24 1415)  Best Verbal Response: 5-->(V5) oriented (07/24/24 1415)  Percy Coma Scale Score: 15 (07/24/24 1415)     [place] Coma Scale:  No data recorded     CIWA:        Restraint Type:            Isolation Status:  No active isolations          Electronically signed by Sharda Villela RN at 07/24/24 1458       Facility-Administered Medications as of 7/25/2024   Medication Dose Route Frequency Provider Last Rate Last Admin    acetaminophen (TYLENOL) tablet 650 mg  650 mg Oral Q6H PRN Jaki Ellis APRN   650 mg at 07/25/24 0851    [COMPLETED] alteplase (ACTIVASE) 81 mg in sterile water (preservative free) 81 mL (1 mg/mL) infusion  81 mg Intravenous Once Dipti Weller APRN 81 mL/hr at 07/24/24 1430 81 mg at 07/24/24 1430    [COMPLETED] alteplase (ACTIVASE) 9 mg in sterile water (preservative free) 9 mL bolus  9 mg Intravenous Once Dipti Weller APRN   9 mg at 07/24/24 1429    aspirin tablet 325 mg  325 mg Oral Daily Dipti Weller APRN        Or    aspirin suppository 300 mg  300 mg Rectal Daily Dipti Weller APRN        atorvastatin (LIPITOR) tablet 80 mg  80 mg Oral Nightly Dipti Weller APRN   80 mg at 07/24/24 2042    sennosides-docusate (PERICOLACE) 8.6-50 MG per tablet 2 tablet  2 tablet Oral BID Rico Martin MD   2 tablet at 07/25/24 0851    And    polyethylene glycol (MIRALAX) packet 17 g  17  g Oral Daily PRN Rico Martin MD        And    bisacodyl (DULCOLAX) EC tablet 5 mg  5 mg Oral Daily PRN Rico Martin MD        And    bisacodyl (DULCOLAX) suppository 10 mg  10 mg Rectal Daily PRN Rico Martin MD        Calcium Replacement - Follow Nurse / BPA Driven Protocol   Does not apply PRN Rico Martin MD        [COMPLETED] iopamidol (ISOVUE-370) 76 % injection 100 mL  100 mL Intravenous Once in imaging Xavi Schaefer MD   120 mL at 07/24/24 1313    Magnesium Standard Dose Replacement - Follow Nurse / BPA Driven Protocol   Does not apply PRN Rico Martin MD        meclizine (ANTIVERT) tablet 25 mg  25 mg Oral TID PRN Raphael Yanes APRN   25 mg at 07/25/24 0708    niCARdipine (CARDENE) 25mg in 250mL NS infusion  5-15 mg/hr Intravenous Titrated Dipti Weller APRN        nitroglycerin (NITROSTAT) SL tablet 0.4 mg  0.4 mg Sublingual Q5 Min PRN Rico Martin MD        [COMPLETED] ondansetron (ZOFRAN) injection 4 mg  4 mg Intravenous Once Xavi Schaefer MD   4 mg at 07/24/24 1406    ondansetron (ZOFRAN) injection 4 mg  4 mg Intravenous Q8H PRN Raphael Yanes APRN   4 mg at 07/25/24 0025    Phosphorus Replacement - Follow Nurse / BPA Driven Protocol   Does not apply PRN Rico Martin MD        Potassium Replacement - Follow Nurse / BPA Driven Protocol   Does not apply PRN Rico Martin MD        [COMPLETED] sodium chloride 0.9 % bolus 1,000 mL  1,000 mL Intravenous Once Xavi Schaefer MD   Stopped at 07/24/24 1522    sodium chloride 0.9 % flush 10 mL  10 mL Intravenous Q12H Dipti Weller APRN   10 mL at 07/25/24 0852    sodium chloride 0.9 % flush 10 mL  10 mL Intravenous PRN Dipti Weller APRN        sodium chloride 0.9 % infusion 40 mL  40 mL Intravenous PRN Dipti Weller APRN        [COMPLETED] sodium chloride 0.9 % infusion  0.81 mL/kg/hr Intravenous Once Dipti Weller APRN 83.4 mL/hr at 07/24/24 1524 0.81 mL/kg/hr at 07/24/24 1524     Lab Results  (all)       Procedure Component Value Units Date/Time    Lipid Panel [[id number]]  (Abnormal) Collected: 07/25/24 0627    Specimen: Blood Updated: 07/25/24 0706     Total Cholesterol 231 mg/dL      Triglycerides 120 mg/dL      HDL Cholesterol 44 mg/dL      LDL Cholesterol  165 mg/dL      VLDL Cholesterol 22 mg/dL      LDL/HDL Ratio 3.70    Narrative:      Cholesterol Reference Ranges  (U.S. Department of Health and Human Services ATP III Classifications)    Desirable          <200 mg/dL  Borderline High    200-239 mg/dL  High Risk          >240 mg/dL      Triglyceride Reference Ranges  (U.S. Department of Health and Human Services ATP III Classifications)    Normal           <150 mg/dL  Borderline High  150-199 mg/dL  High             200-499 mg/dL  Very High        >500 mg/dL    HDL Reference Ranges  (U.S. Department of Health and Human Services ATP III Classifications)    Low     <40 mg/dl (major risk factor for CHD)  High    >60 mg/dl ('negative' risk factor for CHD)        LDL Reference Ranges  (U.S. Department of Health and Human Services ATP III Classifications)    Optimal          <100 mg/dL  Near Optimal     100-129 mg/dL  Borderline High  130-159 mg/dL  High             160-189 mg/dL  Very High        >189 mg/dL    Hemoglobin A1c [[id number]]  (Abnormal) Collected: 07/25/24 0627    Specimen: Blood Updated: 07/25/24 0656     Hemoglobin A1C 5.70 %     Narrative:      Hemoglobin A1C Ranges:    Increased Risk for Diabetes  5.7% to 6.4%  Diabetes                     >= 6.5%  Diabetic Goal                < 7.0%    POC Glucose Once [[id number]]  (Normal) Collected: 07/25/24 0516    Specimen: Blood Updated: 07/25/24 0517     Glucose 110 mg/dL     POC Glucose Once [[id number]]  (Normal) Collected: 07/24/24 2317    Specimen: Blood Updated: 07/24/24 2319     Glucose 90 mg/dL     POC Glucose Once [[id number]]  (Normal) Collected: 07/24/24 1735    Specimen: Blood Updated: 07/24/24 1737     Glucose 102 mg/dL     Urinalysis  With Culture If Indicated - Urine, Clean Catch [[id number]]  (Abnormal) Collected: 07/24/24 1454    Specimen: Urine, Clean Catch Updated: 07/24/24 1516     Color, UA Yellow     Appearance, UA Clear     pH, UA 7.0     Specific Gravity, UA 1.066     Glucose, UA Negative     Ketones, UA Negative     Bilirubin, UA Negative     Blood, UA Negative     Protein, UA Negative     Leuk Esterase, UA Negative     Nitrite, UA Negative     Urobilinogen, UA 1.0 E.U./dL    Narrative:      In absence of clinical symptoms, the presence of pyuria, bacteria, and/or nitrites on the urinalysis result does not correlate with infection.  Urine microscopic not indicated.    COVID PRE-OP / PRE-PROCEDURE SCREENING ORDER (NO ISOLATION) - Swab, Nasopharynx [[id number]]  (Normal) Collected: 07/24/24 1328    Specimen: Swab from Nasopharynx Updated: 07/24/24 1400    Narrative:      The following orders were created for panel order COVID PRE-OP / PRE-PROCEDURE SCREENING ORDER (NO ISOLATION) - Swab, Nasopharynx.  Procedure                               Abnormality         Status                     ---------                               -----------         ------                     COVID-19 and FLU A/B PCR...[[id number]]  Normal              Final result                 Please view results for these tests on the individual orders.    COVID-19 and FLU A/B PCR, 1 HR TAT - Swab, Nasopharynx [[id number]]  (Normal) Collected: 07/24/24 1328    Specimen: Swab from Nasopharynx Updated: 07/24/24 1400     COVID19 Not Detected     Influenza A PCR Not Detected     Influenza B PCR Not Detected    Narrative:      Fact sheet for providers: https://www.fda.gov/media/788817/download    Fact sheet for patients: https://www.fda.gov/media/410653/download    Test performed by PCR.    Comprehensive Metabolic Panel [[id number]]  (Abnormal) Collected: 07/24/24 1305    Specimen: Blood Updated: 07/24/24 1339     Glucose 133 mg/dL      BUN 16 mg/dL      Creatinine 1.03 mg/dL       Sodium 139 mmol/L      Potassium 4.1 mmol/L      Comment: Slight hemolysis detected by analyzer. Result may be falsely elevated.        Chloride 106 mmol/L      CO2 25.0 mmol/L      Calcium 8.8 mg/dL      Total Protein 7.1 g/dL      Albumin 4.4 g/dL      ALT (SGPT) 51 U/L      AST (SGOT) 38 U/L      Alkaline Phosphatase 77 U/L      Total Bilirubin 0.4 mg/dL      Globulin 2.7 gm/dL      Comment: Calculated Result        A/G Ratio 1.6 g/dL      BUN/Creatinine Ratio 15.5     Anion Gap 8.0 mmol/L      eGFR 80.6 mL/min/1.73     Narrative:      GFR Normal >60  Chronic Kidney Disease <60  Kidney Failure <15      Single High Sensitivity Troponin T [[id number]]  (Normal) Collected: 07/24/24 1305    Specimen: Blood Updated: 07/24/24 1339     HS Troponin T 8 ng/L     Narrative:      High Sensitive Troponin T Reference Range:  <14.0 ng/L- Negative Female for AMI  <22.0 ng/L- Negative Male for AMI  >=14 - Abnormal Female indicating possible myocardial injury.  >=22 - Abnormal Male indicating possible myocardial injury.   Clinicians would have to utilize clinical acumen, EKG, Troponin, and serial changes to determine if it is an Acute Myocardial Infarction or myocardial injury due to an underlying chronic condition.         Lactic Acid, Plasma [[id number]]  (Normal) Collected: 07/24/24 1305    Specimen: Blood Updated: 07/24/24 1332     Lactate 1.5 mmol/L      Comment: Falsely depressed results may occur on samples drawn from patients receiving N-Acetylcysteine (NAC) or Metamizole.       Protime-INR [[id number]]  (Normal) Collected: 07/24/24 1305    Specimen: Blood Updated: 07/24/24 1329     Protime 12.8 Seconds      INR 0.96    CBC & Differential [[id number]]  (Normal) Collected: 07/24/24 1305    Specimen: Blood Updated: 07/24/24 1315    Narrative:      The following orders were created for panel order CBC & Differential.  Procedure                               Abnormality         Status                     ---------                                -----------         ------                     CBC Auto Differential[[id number]]        Normal              Final result                 Please view results for these tests on the individual orders.    CBC Auto Differential [[id number]]  (Normal) Collected: 07/24/24 1305    Specimen: Blood Updated: 07/24/24 1315     WBC 7.98 10*3/mm3      RBC 5.35 10*6/mm3      Hemoglobin 15.6 g/dL      Hematocrit 46.5 %      MCV 86.9 fL      MCH 29.2 pg      MCHC 33.5 g/dL      RDW 12.6 %      RDW-SD 40.0 fl      MPV 8.9 fL      Platelets 247 10*3/mm3      Neutrophil % 66.7 %      Lymphocyte % 25.3 %      Monocyte % 5.6 %      Eosinophil % 1.5 %      Basophil % 0.5 %      Immature Grans % 0.4 %      Neutrophils, Absolute 5.32 10*3/mm3      Lymphocytes, Absolute 2.02 10*3/mm3      Monocytes, Absolute 0.45 10*3/mm3      Eosinophils, Absolute 0.12 10*3/mm3      Basophils, Absolute 0.04 10*3/mm3      Immature Grans, Absolute 0.03 10*3/mm3      nRBC 0.0 /100 WBC     POC CHEM 8 [[id number]]  (Abnormal) Collected: 07/24/24 1305    Specimen: Blood Updated: 07/24/24 1309     Glucose 134 mg/dL      BUN 17 mg/dL      Creatinine 1.00 mg/dL      Sodium 141 mmol/L      POC Potassium 3.9 mmol/L      Chloride 105 mmol/L      Total CO2 25 mmol/L      Hemoglobin 15.3 g/dL      Comment: Serial Number: [id number]:  [id number]        Hematocrit 45 %      Ionized Calcium 1.22 mmol/L      eGFR 83.5 mL/min/1.73           Imaging Results (All)       Procedure Component Value Units Date/Time    CT Head Without Contrast Stroke Protocol [[id number]] Collected: 07/24/24 2232     Updated: 07/24/24 2243    Narrative:      CT HEAD WO CONTRAST STROKE PROTOCOL    Date of Exam: 7/24/2024 10:22 PM EDT    Indication: dizziness, s/p tpa.    Comparison: 7/24/2024 at 13:0 0 hours    Technique: Axial CT images were obtained of the head without contrast administration.  Reconstructed coronal images were also obtained. Automated exposure control and iterative  construction methods were used.    Scan Time: 22: 18 hours  Results discussed with stroke team navigator at 10:35 p.m.      Findings:  There is no hemorrhage, edema, or mass effect. There is no loss of gray-white differentiation. The CSF spaces are within normal limits.      Impression:      Impression:      1. No intracranial hemorrhage  2. No CT changes of vascular territory brain ischemia at this time        Electronically Signed: Chandan Green    7/24/2024 10:39 PM EDT    Workstation ID: [id number]    XR Chest 1 View [[id number]] Collected: 07/24/24 1428     Updated: 07/24/24 1432    Narrative:      XR CHEST 1 VW    Date of Exam: 7/24/2024 2:06 PM EDT    Indication: dizzy    Comparison: 2/7/2024    Findings:  Heart and pulmonary vasculature appear within normal spleen lungs are well expanded and clear except for stable mild chronic interstitial changes. Thin linear scarring previously noted the right lung base is barely visible today. No edema effusion or   pneumothorax is seen. Bony structures appear intact.      Impression:      Impression:  No new chest disease.      Electronically Signed: Dashawn Morgan MD    7/24/2024 2:29 PM EDT    Workstation ID: [id number]    MRI Brain Without Contrast [[id number]] Collected: 07/24/24 1410     Updated: 07/24/24 1417    Narrative:      MRI BRAIN WO CONTRAST    Date of Exam: 7/24/2024 1:45 PM EDT    Indication: hyperacute MRI:  dizziness, ataxic gait, nausea and vomitting.     Comparison: Same day head CT and CT cerebral perfusion:    Technique: Abbreviated/hyperacute stroke protocol was utilized; the following sequences were obtained: Axial diffusion, axial ADC, axial T2 FLAIR fat sat.      Findings:  No acute infarct. The gray-white differentiation is grossly maintained. No intracranial mass. No mass effect. The major intracranial vascular flow voids appear grossly preserved. Normal size and configuration of the ventricles. Unremarkable appearance of   the orbits, paranasal  sinuses, and osseous structures.      Impression:      Impression:  No acute infarct.        Electronically Signed: Raphael Cam MD    7/24/2024 2:14 PM EDT    Workstation ID: [id number]    CT Angiogram Head w AI Analysis of LVO [[id number]] Collected: 07/24/24 1335     Updated: 07/24/24 1344    Narrative:      CT ANGIOGRAM HEAD W AI ANALYSIS OF LVO, CT CEREBRAL PERFUSION W WO CONTRAST, CT ANGIOGRAM NECK    Date of Exam: 7/24/2024 1:06 PM EDT    Indication: dizzy  Acute Stroke.    Comparison: None available.    Technique: Axial CT images of the brain were obtained prior to and after the administration of 120 mL Isovue-370. CT Perfusion protocol was utilized. Automated post processing was performed by RAPID software and submitted to PACS for interpretation.  CTA   of the head and neck were performed after the administration of iodinated contrast.  Reconstructed coronal and sagittal images were also obtained. A 3-D volume rendered image was created for interpretation. Automated exposure control and iterative   reconstruction methods were used.       Findings:  CT perfusion: Color maps appear symmetric without evidence of core infarct or significant territorial ischemic tissue at risk.    CT ANGIOGRAM: The lung apices are clear. Evaluation of the neck soft tissues demonstrates no pathologic cervical adenopathy or unexpected aerodigestive tract mass. The osseous structures demonstrate no evidence of acute fracture or aggressive osseous   lesion. Patent aortic arch with three-vessel branching. The visualized subclavian arteries are patent bilaterally. There is no significant atherosclerotic narrowing of the ICA origins, 0% stenosis present by NASCET criteria bilaterally. The vertebral   arteries are normal in course and caliber bilaterally. Intracranially, the carotid siphons demonstrate no significant atherosclerotic narrowing. The anterior cerebral arteries are normal in course and caliber bilaterally. The right and  left middle   cerebral arteries demonstrate no evidence of flow-limiting stenosis, large vessel occlusion or aneurysm. The vertebral basilar system is patent. The posterior cerebral arteries are normal in course and caliber bilaterally.      Impression:      Impression:  CT perfusion demonstrates no evidence of core infarct or significant territorial ischemic tissue at risk.    CT angiogram demonstrates no evidence of flow-limiting stenosis, large vessel occlusion or aneurysm.        Electronically Signed: Vik Kennedy MD    7/24/2024 1:41 PM EDT    Workstation ID: [id number]    CT Angiogram Neck [[id number]] Collected: 07/24/24 1335     Updated: 07/24/24 1344    Narrative:      CT ANGIOGRAM HEAD W AI ANALYSIS OF LVO, CT CEREBRAL PERFUSION W WO CONTRAST, CT ANGIOGRAM NECK    Date of Exam: 7/24/2024 1:06 PM EDT    Indication: dizzy  Acute Stroke.    Comparison: None available.    Technique: Axial CT images of the brain were obtained prior to and after the administration of 120 mL Isovue-370. CT Perfusion protocol was utilized. Automated post processing was performed by RAPID software and submitted to PACS for interpretation.  CTA   of the head and neck were performed after the administration of iodinated contrast.  Reconstructed coronal and sagittal images were also obtained. A 3-D volume rendered image was created for interpretation. Automated exposure control and iterative   reconstruction methods were used.       Findings:  CT perfusion: Color maps appear symmetric without evidence of core infarct or significant territorial ischemic tissue at risk.    CT ANGIOGRAM: The lung apices are clear. Evaluation of the neck soft tissues demonstrates no pathologic cervical adenopathy or unexpected aerodigestive tract mass. The osseous structures demonstrate no evidence of acute fracture or aggressive osseous   lesion. Patent aortic arch with three-vessel branching. The visualized subclavian arteries are patent bilaterally.  There is no significant atherosclerotic narrowing of the ICA origins, 0% stenosis present by NASCET criteria bilaterally. The vertebral   arteries are normal in course and caliber bilaterally. Intracranially, the carotid siphons demonstrate no significant atherosclerotic narrowing. The anterior cerebral arteries are normal in course and caliber bilaterally. The right and left middle   cerebral arteries demonstrate no evidence of flow-limiting stenosis, large vessel occlusion or aneurysm. The vertebral basilar system is patent. The posterior cerebral arteries are normal in course and caliber bilaterally.      Impression:      Impression:  CT perfusion demonstrates no evidence of core infarct or significant territorial ischemic tissue at risk.    CT angiogram demonstrates no evidence of flow-limiting stenosis, large vessel occlusion or aneurysm.        Electronically Signed: iVk Kennedy MD    7/24/2024 1:41 PM EDT    Workstation ID: [id number]    CT CEREBRAL PERFUSION WITH & WITHOUT CONTRAST [[id number]] Collected: 07/24/24 1335     Updated: 07/24/24 1344    Narrative:      CT ANGIOGRAM HEAD W AI ANALYSIS OF LVO, CT CEREBRAL PERFUSION W WO CONTRAST, CT ANGIOGRAM NECK    Date of Exam: 7/24/2024 1:06 PM EDT    Indication: dizzy  Acute Stroke.    Comparison: None available.    Technique: Axial CT images of the brain were obtained prior to and after the administration of 120 mL Isovue-370. CT Perfusion protocol was utilized. Automated post processing was performed by RAPID software and submitted to PACS for interpretation.  CTA   of the head and neck were performed after the administration of iodinated contrast.  Reconstructed coronal and sagittal images were also obtained. A 3-D volume rendered image was created for interpretation. Automated exposure control and iterative   reconstruction methods were used.       Findings:  CT perfusion: Color maps appear symmetric without evidence of core infarct or significant  territorial ischemic tissue at risk.    CT ANGIOGRAM: The lung apices are clear. Evaluation of the neck soft tissues demonstrates no pathologic cervical adenopathy or unexpected aerodigestive tract mass. The osseous structures demonstrate no evidence of acute fracture or aggressive osseous   lesion. Patent aortic arch with three-vessel branching. The visualized subclavian arteries are patent bilaterally. There is no significant atherosclerotic narrowing of the ICA origins, 0% stenosis present by NASCET criteria bilaterally. The vertebral   arteries are normal in course and caliber bilaterally. Intracranially, the carotid siphons demonstrate no significant atherosclerotic narrowing. The anterior cerebral arteries are normal in course and caliber bilaterally. The right and left middle   cerebral arteries demonstrate no evidence of flow-limiting stenosis, large vessel occlusion or aneurysm. The vertebral basilar system is patent. The posterior cerebral arteries are normal in course and caliber bilaterally.      Impression:      Impression:  CT perfusion demonstrates no evidence of core infarct or significant territorial ischemic tissue at risk.    CT angiogram demonstrates no evidence of flow-limiting stenosis, large vessel occlusion or aneurysm.        Electronically Signed: Vik Kennedy MD    7/24/2024 1:41 PM EDT    Workstation ID: [id number]    CT Head Without Contrast Stroke Protocol [[id number]] Collected: 07/24/24 1330     Updated: 07/24/24 1336    Narrative:      CT HEAD WO CONTRAST STROKE PROTOCOL    Date of Exam: 7/24/2024 12:56 PM EDT    Indication: dizziness.    Comparison: Brain MRI 4/9/2024    Technique: Axial CT images were obtained of the head without contrast administration.  Reconstructed coronal images were also obtained. Automated exposure control and iterative construction methods were used.    Scan Time: 1302  Results discussed with stroke team Navigator at 1307, 7/24/2024.      Findings:  The  calvarium appears intact. There is very mild left ethmoid disease. Included paranasal sinuses and mastoids otherwise appear clear. No gross abnormalities are seen in the orbits. There is a mild degree of generalized cerebral atrophy well within   expected limits for the patient's age. No hemorrhage, contusion, or edema is seen. There is no evidence of mass or mass effect, infarct, hydrocephalus, or abnormal extra-axial collection.      Impression:      Impression:  No evidence of acute intracranial disease.        Electronically Signed: Dashawn Morgan MD    7/24/2024 1:33 PM EDT    Workstation ID: [id number]          ECG/EMG Results (all)       Procedure Component Value Units Date/Time    ECG 12 Lead Other; dizzy [[id number]] Collected: 07/24/24 1416     Updated: 07/24/24 1430     QT Interval 436 ms      QTC Interval 457 ms     Narrative:      Test Reason : STROKE  Blood Pressure :   */*   mmHG  Vent. Rate :  66 BPM     Atrial Rate :  66 BPM     P-R Int :   * ms          QRS Dur : 104 ms      QT Int : 436 ms       P-R-T Axes :   * -27 -19 degrees     QTc Int : 457 ms    Accelerated Junctional rhythm  Abnormal ECG  When compared with ECG of 08-FEB-2024 01:01,  Junctional rhythm has replaced Sinus rhythm  Minimal criteria for Anteroseptal infarct are no longer present  ST now depressed in Inferior leads  Inverted T waves have replaced nonspecific T wave abnormality in Inferior leads    Referred By: OSIEL FRY           Confirmed By:     Adult Transthoracic Echo Complete W/ Cont if Necessary Per Protocol (With Agitated Saline) [[id number]] Resulted: 07/25/24 1025     Updated: 07/25/24 1031     EF(MOD-bp) 64.9 %      LVIDd 4.8 cm      LVIDs 3.0 cm      IVSd 1.10 cm      LVPWd 1.10 cm      FS 37.5 %      IVS/LVPW 1.00 cm      ESV(cubed) 27.0 ml      EDV(cubed) 110.6 ml      LV mass(C)d 194.0 grams      LVOT area 3.5 cm2      LVOT diam 2.10 cm      EDV(MOD-sp2) 90.8 ml      EDV(MOD-sp4) 107.0 ml      ESV(MOD-sp2) 31.7 ml       "ESV(MOD-sp4) 34.8 ml      SV(MOD-sp2) 59.1 ml      SV(MOD-sp4) 72.2 ml      EF(MOD-sp2) 65.1 %      EF(MOD-sp4) 67.5 %      MV E max eddie 73.9 cm/sec      MV A max eddie 96.4 cm/sec      MV dec time 0.25 sec      MV E/A 0.77     LA ESV Index (BP) 25.2 ml/m2      Med Peak E' Eddie 8.5 cm/sec      Lat Peak E' Eddie 9.8 cm/sec      TR max eddie 193.0 cm/sec      Avg E/e' ratio 8.08     SV(LVOT) 115.7 ml      RV Base 3.5 cm      RV Mid 3.2 cm      RV Length 8.4 cm      TAPSE (>1.6) 1.98 cm      RV S' 11.3 cm/sec      LA dimension (2D)  4.4 cm      LV V1 max 143.0 cm/sec      LV V1 max PG 8.2 mmHg      LV V1 mean PG 4.5 mmHg      LV V1 VTI 33.4 cm      Ao pk eddie 165.0 cm/sec      Ao max PG 10.9 mmHg      Ao mean PG 6.0 mmHg      Ao V2 VTI 37.7 cm      DANI(I,D) 3.1 cm2      MV max PG 5.2 mmHg      MV mean PG 2.00 mmHg      MV V2 VTI 32.0 cm      MV P1/2t 57.4 msec      MVA(P1/2t) 3.8 cm2      MVA(VTI) 3.6 cm2      MV dec slope 577.0 cm/sec2      MR max eddie 301.0 cm/sec      MR max PG 36.2 mmHg      TR max PG 15 mmHg      PA V2 max 91.4 cm/sec      PA acc time 0.08 sec      Ao root diam 3.3 cm      BH CV ECHO SHUNT ASSESSMENT PERFORMED (HIDDEN SCRIPTING) 1     RVSP(TR) 18 mmHg      RAP systole 3 mmHg              Physician Progress Notes (all)        Carline Hinds MD at 07/25/24 0663          Stroke Neurology Progress Note     Subjective     This patient was seen in follow-up for: small right pontine infarct s/p TNK  Present for the encounter were: self, patient     Subjective:  My first encounter with the patient.  No acute events overnight.  Patient reports persistent dizziness with his eyes closed which he describes as \"room spinning\".  Patient reports 2/10 headache and denies throat swelling.  Discussed results of MRI.  He received some meclizine with uncertain effect.      Objective      Temp:  [97.7 °F (36.5 °C)-97.9 °F (36.6 °C)] 97.9 °F (36.6 °C)  Heart Rate:  [53-80] 66  Resp:  [16-18] 18  BP: (105-170)/(57-95) " "122/69            Objective    Physical Exam:  General Appearance: Alert  HEENT: anicteric sclera, no scleral injection  Lungs: respirations appear comfortable, no obvious increased work of breathing  Extremities: No cyanosis or fingernail clubbing   Skin: No rashes in exposed skin areas     Neurological Examination:   Mental status: Alert and oriented. No dysarthria. Able to name and repeat.  Cranial Nerves: Visual fields intact. Extraocular movements intact with no nystagmus. Midline gaze. Face symmetric.    Sensory: Normal sensory exam to light touch.  Motor: Normal tone. Absent pronator drift.  Strength:  LUE: 5/5 biceps, triceps,   LLE: 5/5 hip flexion/extension  RUE: 5/5 biceps, triceps,   RLE:  5/5 hip flexion/extension  Cerebellar: Finger-to-nose intact. Heel-to-shin intact. Rapid alternating movements are intact.   Gait: Not assessed.     Labs:    No results found for: \"HGBA1C\"   Lab Results   Component Value Date    CHOL 238 (H) 03/06/2024    TRIG 169 (H) 03/06/2024    HDL 39 (L) 03/06/2024     (H) 03/06/2024       Lab Results   Component Value Date    WBC 7.98 07/24/2024    HGB 15.6 07/24/2024    HGB 15.3 07/24/2024    HCT 46.5 07/24/2024    HCT 45 07/24/2024    MCV 86.9 07/24/2024     07/24/2024     Lab Results   Component Value Date    GLUCOSE 133 (H) 07/24/2024    BUN 16 07/24/2024    CREATININE 1.03 07/24/2024    CREATININE 1.00 07/24/2024    EGFRIFNONA 71 01/14/2022    EGFRIFAFRI 86 01/14/2022    BCR 15.5 07/24/2024    CO2 25.0 07/24/2024    CALCIUM 8.8 07/24/2024    ALBUMIN 4.4 07/24/2024    AST 38 07/24/2024    ALT 51 (H) 07/24/2024       Results from last 7 days   Lab Units 07/24/24  1305   SODIUM mmol/L 139   POTASSIUM mmol/L 4.1   CHLORIDE mmol/L 106   CO2 mmol/L 25.0   BUN mg/dL 16   CREATININE mg/dL 1.00  1.03   CALCIUM mg/dL 8.8   BILIRUBIN mg/dL 0.4   ALK PHOS U/L 77   ALT (SGPT) U/L 51*   AST (SGOT) U/L 38   GLUCOSE mg/dL 133*       No results found for: \"BLOODCX\"  UA "          7/24/2024    14:54   Urinalysis   Specific [place], UA 1.066    Ketones, UA Negative    Blood, UA Negative    Leukocytes, UA Negative    Nitrite, UA Negative      Lab Results   Component Value Date    URINECX <10,000 CFU/mL Escherichia coli (A) 08/04/2022       Results Review:      All brain images and reports were personally reviewed and I agree with the interpretations except as noted below.    CT Head Without Contrast 7/24/2024  Impression: 1. No intracranial hemorrhage 2. No CT changes of vascular territory brain ischemia at this time     MRI Brain Without Contrast: 7/24/2024  Impression: No acute infarct.   I personally reviewed the images and I find a small right pontine infarct.  Discussed with Dr. Morgan (Radiology) prior to administering TNK who agreed small right pontine restricted diffusion.        CT Angiogram Head and Neck 7/24/2024  Impression: CT perfusion demonstrates no evidence of core infarct or significant territorial ischemic tissue at risk. CT angiogram demonstrates no evidence of flow-limiting stenosis, large vessel occlusion or aneurysm.     CT Perfusion 7/24/2024  Impression: CT perfusion demonstrates no evidence of core infarct or significant territorial ischemic tissue at risk. CT angiogram demonstrates no evidence of flow-limiting stenosis, large vessel occlusion or aneurysm.     CT Head Without Contrast Stroke Protocol 7/24/2024  Impression: No evidence of acute intracranial disease.         Assessment/Plan     Assessment:    # Acute right pontine infarct s/p TNK (wake-up protocol)  Small vessel ischemic disease.  Patient risk factors include HTN, HLD.    -If 24-hour CT head clear, initiate aspirin 300 mg LA  -Atorvastatin 80 mg daily  -SBP <180  -TTE pending   -If symptoms persist or worsen, will consider repeat MRI to further evaluate  -PT OT when able  -Neuro-checks per unit protocol while inpatient  -DVT prophylaxis: SCD  -Follow-up in stroke clinic       Patient education:  call 911 or present to emergency department with any stroke symptom, including unilateral face, arm, or leg weakness, numbness, or paresthesias, unilateral facial droop, speech deficits, dizziness with nausea, vomiting, nystagmus, and incoordination, visual deficits, or severe onset headache.    Stroke neurology will follow results of above workup.  Please call with questions.     Carline Hinds MD  [facility] [place] [facility]  24  06:36 EDT        Electronically signed by Carline Hinds MD at 24 0836          Consult Notes (all)        Dipti Weller APRN at 24 1338          Stroke Consult Note    Patient Name: Kennedy Griffith   MRN: [id number]  Age: 63 y.o.  Sex: male  : 1960    Primary Care Physician: Carline Schmitt APRN  Referring Physician: Dr. Schaefer    TIME STROKE TEAM CALLED: 1255 EST     TIME PATIENT SEEN: 1300 EST    Handedness: Right  Race:      Chief Complaint/Reason for Consultation: dizziness, nausea, ataxic gait    Subjective .  HPI: Kennedy Griffith is a 63-year-old, , right-handed male with known diagnosis of HTN, headaches, history of ureteral stents (s/p removal), HLD, and depression who presents with dizziness, ataxic gait and nausea.  Last known well 2 AM before going to bed.  Patient awoke at 1030 with extreme dizziness and difficulty walking accompanied by severe nausea and vomiting.  The nausea and vomiting did improve slightly but he continued to have dizziness and presented to the ER.  Patient notes that he did have some similar dizziness yesterday morning that resolved after just a few minutes and he was completely back to baseline.  He denies any current headache, denies unilateral weakness, no numbness or tingling, no visual disturbance or speech changes.  No recent medication changes however he did start taking Flexeril a couple of weeks ago.    On initial exam patient is alert and oriented x 3.  His speech is clear and fluent, no  aphasia.  Gaze is normal, all visual fields are intact.  Face is symmetrical, tongue protrudes midline.  No drift in any extremity.  Sensation is equal to light touch bilaterally.  Patient is still complaining of dizziness at rest while laying on the CT table.  He says the nausea has improved.  I did attempt to ambulate the patient from the CT table to a gurney; upon standing he was very unsteady and almost fell.  Given his ongoing symptoms concerning for posterior circulation stroke we elected to proceed with hyperacute MRI for possible tPA administration with wake-up protocol.    MRI was reviewed by myself and Dr. Hinds.  There is a small area of restricted diffusion in the right vinay (image 74 and 75); Dr. Hinds discussed with Dr. Morgan who was in agreement that this likely represents a small acute infarct.  Reviewed imaging findings with patient and family.  Patient is without contraindications to IV thrombolytics, reviewed risks and benefits and he chose to proceed with IV tPA which was administered at 1429.  Of note just prior to tPA administration it was noted that patient had also developed a left facial droop, no other new symptoms.    Last Known Normal Date/Time:  EST     Review of Systems   Constitutional:  Positive for activity change. Negative for fever.   HENT:  Negative for trouble swallowing.    Eyes:  Negative for visual disturbance.   Respiratory:  Negative for cough and shortness of breath.    Cardiovascular:  Positive for chest pain. Negative for palpitations.        Reports occasional chest pain with exertion   Gastrointestinal:  Positive for nausea and vomiting.   Musculoskeletal:  Positive for gait problem.   Neurological:  Positive for dizziness and facial asymmetry. Negative for speech difficulty, weakness, numbness and headaches.   Psychiatric/Behavioral: Negative.        Past Medical History:   Diagnosis Date    Depression     Fatigue     chronic    History of kidney stones      Hyperlipidemia     Sleepiness     Tension headache      Past Surgical History:   Procedure Laterality Date    LIVER BIOPSY      benign    URETEROSCOPY LASER LITHOTRIPSY WITH STENT INSERTION Right 12/16/2022    Procedure: CYSTOSCOPY, RIGHT URETEROSCOPY RETROGRADE WITH STENT INSERTION;  Surgeon: Tej Christine MD;  Location: [facility] OR;  Service: Urology;  Laterality: Right;    URETEROSCOPY LASER LITHOTRIPSY WITH STENT INSERTION Right 12/22/2022    Procedure: URETEROSCOPY LASER LITHOTRIPSY WITH STENT INSERTION RIGHT;  Surgeon: Tej Christine MD;  Location:  [facility] OR;  Service: Urology;  Laterality: Right;     Family History   Problem Relation Age of Onset    Other Mother         cardiac arrhythmia/pacer    Arthritis Father     No Known Problems Sister     No Known Problems Brother     Colon cancer Maternal Grandmother     No Known Problems Maternal Grandfather     Heart disease Paternal Grandmother     Coronary artery disease Paternal Grandfather     Lung cancer Paternal Grandfather     Other Daughter         PTSD/suicide    No Known Problems Son     ADD / ADHD Son      Social History     Socioeconomic History    Marital status:    Tobacco Use    Smoking status: Never     Passive exposure: Never    Smokeless tobacco: Never   Vaping Use    Vaping status: Never Used   Substance and Sexual Activity    Alcohol use: Yes     Comment: on rare occasion    Drug use: Never    Sexual activity: [facility]er     Comment:      No Known Allergies  Prior to Admission medications    Medication Sig Start Date End Date Taking? Authorizing Provider   amphetamine-dextroamphetamine (ADDERALL) 20 MG tablet Take 1 tablet by mouth 3 (Three) Times a Day.  Patient not taking: Reported on 3/4/2024    Faiza Tay MD   buPROPion (WELLBUTRIN) 75 MG tablet Take 1 tablet by mouth Daily. NEEDS REFILL 3/20/23   Faiza Tay MD   busPIRone (BUSPAR) 15 MG tablet Take 1 tablet by mouth 3 (Three) Times a Day. 1/19/21   Oskar  JAYLEN Serrano   cyclobenzaprine (FLEXERIL) 10 MG tablet Take 1 tablet by mouth At Night As Needed for Muscle Spasms (muscle spasm, tension). 3/4/24   Carline Schmitt APRN   DULoxetine (CYMBALTA) 60 MG capsule TAKE 1 CAPSULE BY MOUTH EVERY DAY 9/9/22   Carline Schmitt APRN   fexofenadine (Allegra Allergy) 180 MG tablet Take 1 tablet by mouth Daily. 5/9/23   Carline Schmitt APRN   propranolol (INDERAL) 20 MG tablet Take 1 tablet by mouth 2 (Two) Times a Day. 4/20/23   Provider, MD Faiza   rosuvastatin (Crestor) 20 MG tablet Take 1 tablet by mouth Daily. 3/14/24   Carline Schmitt APRN            Objective     Temp:  [97.7 °F (36.5 °C)] 97.7 °F (36.5 °C)  Heart Rate:  [53] 53  Resp:  [16] 16  BP: (169)/(88) 169/88  Neurological Exam  Mental Status  Alert. Oriented to person, place, and time. Speech is normal. Language is fluent with no aphasia. Attention and concentration are normal.    Cranial Nerves  CN II: Visual fields full to confrontation.  CN III, IV, VI: Extraocular movements intact bilaterally. Normal lids and orbits bilaterally. Pupils equal round and reactive to light bilaterally.  CN V: Facial sensation is normal.  CN VII:  Right: There is no facial weakness.  Left: There is central facial weakness.  CN IX, X: Palate elevates symmetrically  CN XII: Tongue midline without atrophy or fasciculations.    Motor  Normal muscle bulk throughout. No fasciculations present. Normal muscle tone. No abnormal involuntary movements. Strength is 5/5 throughout all four extremities.    Sensory  Light touch is normal in upper and lower extremities.     Coordination  Right: Finger-to-nose normal. Heel-to-shin normal.Left: Finger-to-nose normal. Heel-to-shin normal.    Gait   Abnormal gait.Casual gait: Wide stance. Reduced stride length. Ataxic gait.      Physical Exam  Vitals reviewed.   Constitutional:       Appearance: Normal appearance.   HENT:      Head: Normocephalic and atraumatic.   Eyes:       General: Lids are normal.      Extraocular Movements: Extraocular movements intact.      Pupils: Pupils are equal, round, and reactive to light.   Cardiovascular:      Rate and Rhythm: Normal rate.   Pulmonary:      Effort: Pulmonary effort is normal. No respiratory distress.   Musculoskeletal:         General: No swelling. Normal range of motion.      Cervical back: Normal range of motion and neck supple.   Skin:     General: Skin is warm and dry.   Neurological:      Mental Status: He is alert and oriented to person, place, and time.      Cranial Nerves: Cranial nerve deficit present.      Sensory: No sensory deficit.      Motor: Motor strength is normal.No weakness.      Gait: Gait abnormal.   Psychiatric:         Mood and Affect: Mood normal.         Speech: Speech normal.         Behavior: Behavior normal.         Acute Stroke Data    IV Thrombolytic (TPAInclusion / Exclusion Criteria    Time: 14:15 EDT  Person Administering Scale: JAYLEN Orosco    Inclusion Criteria  [x]   18 years of age or greater   []   Onset of symptoms < 4.5 hours before beginning treatment (stroke onset = time patient was last seen well or without symptoms).   []   Diagnosis of acute ischemic stroke causing measurable disabling deficit (Complete Hemianopia, Any Aphasia, Visual or Sensory Extinction, Any weakness limiting sustained effort against gravity)   []   Any remaining deficit considered potentially disabling in view of patient and practitioner   Exclusion criteria (Do not proceed with Alteplase if any are checked under exclusion criteria)  []   Onset unknown or GREATER than 4.5 hours   []   ICH on CT/MRI   []   CT demonstrates hypodensity representing acute or subacute infarct   []   Significant head trauma or prior stroke in the previous 3 months   []   Symptoms suggestive of subarachnoid hemorrhage   []   History of un-ruptured intracranial aneurysm GREATER than 10 mm   []   Recent intracranial or intraspinal surgery  within the last 3 months   []   Arterial puncture at a non-compressible site in the previous 7 days   []   Active internal bleeding   []   Acute bleeding tendency   []   Platelet count LESS than 100,000 for known hematological diseases such as leukemia, thrombocytopenia or chronic cirrhosis   []   Current use of anticoagulant with INR GREATER than 1.7 or PT GREATER than 15 seconds, aPTT GREATER than 40 seconds   []   Heparin received within 48 hours, resulting in abnormally elevated aPTT GREATER than upper limit of normal   []   Current use of direct thrombin inhibitors or direct factor Xa inhibitors in the past 48 hours   []   Elevated blood pressure refractory to treatment (systolic GREATER than 185 mm/Hg or diastolic  GREATER than 110 mm/Hg   []   Suspected infective endocarditis and aortic arch dissection   []   Current use of therapeutic treatment dose of low-molecular-weight heparin (LMWH) within the previous 24 hours   []   Structural GI malignancy or bleed   Relative exclusion for all patients  []   Only minor nondisabling symptoms   []   Pregnancy   []   Seizure at onset with postictal residual neurological impairments   []   Major surgery or previous trauma within past 14 days   []   History of previous spontaneous ICH, intracranial neoplasm, or AV malformation   []   Postpartum (within previous 14 days)   []   Recent GI or urinary tract hemorrhage (within previous 21 days)   []   Recent acute MI (within previous 3 months)   []   History of unruptured intracranial aneurysm LESS than 10 mm   []   History of ruptured intracranial aneurysm   []   Blood glucose LESS than 50 mg/dL (2.7 mmol/L)   []   Dural puncture within the last 7 days   []   Known GREATER than 10 cerebral microbleeds   Additional exclusions for patients with symptoms onset between 3 and 4.5 hours.  []   Age > 80.   []   On any anticoagulants regardless of INR  >>> Warfarin (Coumadin), Heparin, Enoxaparin (Lovenox), fondaparinux (Arixtra),  bivalirudin (Angiomax), Argatroban, dabigatran (Pradaxa), rivaroxaban (Xarelto), or apixaban (Eliquis)   []   Severe stroke (NIHSS > 25).   []   History of BOTH diabetes and previous ischemic stroke.   [x]   The risks and benefits have been discussed with the patient or family related to the administration of IV alteplase for stroke symptoms.   [x]   I have discussed and reviewed the patient's case and imaging with the attending prior to IV Thrombolytic (TPA).   1429 Time Thrombolytic administered   Thrombolytic administration decision making delayed in order to obtain hyperacute MRI under wake-up protocol.  Hyperacute MRI was reviewed by both myself, Dr. Hinds and Dr. Morgan who agreed there is restricted diffusion in the right vinay not evident on FLAIR imaging making patient a candidate for TPA under the wake-up protocol.    Hospital Meds:  Scheduled- ondansetron, 4 mg, Intravenous, Once  sodium chloride, 1,000 mL, Intravenous, Once      Infusions-     PRNs-   [COMPLETED] Insert Peripheral IV **AND** sodium chloride    Functional Status Prior to Current Stroke/[place] Score: MRS 0    NIH Stroke Scale  Time: 13:05  EDT  Person Administering Scale: JAYLEN Orosco    1a  Level of consciousness: 0=alert; keenly responsive   1b. LOC questions:  0=Answers both questions correctly   1c. LOC commands: 0=Performs both tasks correctly   2.  Best Gaze: 0=normal   3.  Visual: 0=No visual loss   4. Facial Palsy: 0=Normal symmetric movement   5a.  Motor left arm: 0=No drift, limb holds 90 (or 45) degrees for full 10 seconds   5b.  Motor right arm: 0=No drift, limb holds 90 (or 45) degrees for full 10 seconds   6a. motor left le=No drift, limb holds 90 (or 45) degrees for full 10 seconds   6b  Motor right le=No drift, limb holds 90 (or 45) degrees for full 10 seconds   7. Limb Ataxia: 0=Absent   8.  Sensory: 0=Normal; no sensory loss   9. Best Language:  0=No aphasia, normal   10. Dysarthria: 0=Normal   11.  Extinction and Inattention: 0=No abnormality    Total:   0         NIH Stroke Scale  Time: 14:15EDT  Person Administering Scale: JAYLEN Orosco    1a  Level of consciousness: 0=alert; keenly responsive   1b. LOC questions:  0=Performs both tasks correctly   1c. LOC commands: 0=Performs both tasks correctly   2.  Best Gaze: 0=normal   3.  Visual: 0=No visual loss   4. Facial Palsy: 0=Normal symmetric movement   5a.  Motor left arm: 0=No drift, limb holds 90 (or 45) degrees for full 10 seconds   5b.  Motor right arm: 0=No drift, limb holds 90 (or 45) degrees for full 10 seconds   6a. motor left le=No drift, limb holds 90 (or 45) degrees for full 10 seconds   6b  Motor right le=No drift, limb holds 90 (or 45) degrees for full 10 seconds   7. Limb Ataxia: 0=Absent   8.  Sensory: 0=Normal; no sensory loss   9. Best Language:  0=No aphasia, normal   10. Dysarthria: 0=Normal   11. Extinction and Inattention: 0=No abnormality    Total:   0       Results Reviewed:  I have personally reviewed current lab, radiology, and data and agree with results.  WBC   Date Value Ref Range Status   2024 7.98 3.40 - 10.80 10*3/mm3 Final     RBC   Date Value Ref Range Status   2024 5.35 4.14 - 5.80 10*6/mm3 Final     Hemoglobin   Date Value Ref Range Status   2024 15.6 13.0 - 17.7 g/dL Final   2024 15.3 12.0 - 17.0 g/dL Final     Comment:     Serial Number: [id number]:  [id number]     Hematocrit   Date Value Ref Range Status   2024 46.5 37.5 - 51.0 % Final   2024 45 38 - 51 % Final     MCV   Date Value Ref Range Status   2024 86.9 79.0 - 97.0 fL Final     MCH   Date Value Ref Range Status   2024 29.2 26.6 - 33.0 pg Final     MCHC   Date Value Ref Range Status   2024 33.5 31.5 - 35.7 g/dL Final     RDW   Date Value Ref Range Status   2024 12.6 12.3 - 15.4 % Final     RDW-SD   Date Value Ref Range Status   2024 40.0 37.0 - 54.0 fl Final     MPV   Date Value Ref  Range Status   07/24/2024 8.9 6.0 - 12.0 fL Final     Platelets   Date Value Ref Range Status   07/24/2024 247 140 - 450 10*3/mm3 Final     Neutrophil %   Date Value Ref Range Status   07/24/2024 66.7 42.7 - 76.0 % Final     Lymphocyte %   Date Value Ref Range Status   07/24/2024 25.3 19.6 - 45.3 % Final     Monocyte %   Date Value Ref Range Status   07/24/2024 5.6 5.0 - 12.0 % Final     Eosinophil %   Date Value Ref Range Status   07/24/2024 1.5 0.3 - 6.2 % Final     Basophil %   Date Value Ref Range Status   07/24/2024 0.5 0.0 - 1.5 % Final     Immature Grans %   Date Value Ref Range Status   07/24/2024 0.4 0.0 - 0.5 % Final     Neutrophils, Absolute   Date Value Ref Range Status   07/24/2024 5.32 1.70 - 7.00 10*3/mm3 Final     Lymphocytes, Absolute   Date Value Ref Range Status   07/24/2024 2.02 0.70 - 3.10 10*3/mm3 Final     Monocytes, Absolute   Date Value Ref Range Status   07/24/2024 0.45 0.10 - 0.90 10*3/mm3 Final     Eosinophils, Absolute   Date Value Ref Range Status   07/24/2024 0.12 0.00 - 0.40 10*3/mm3 Final     Basophils, Absolute   Date Value Ref Range Status   07/24/2024 0.04 0.00 - 0.20 10*3/mm3 Final     Immature Grans, Absolute   Date Value Ref Range Status   07/24/2024 0.03 0.00 - 0.05 10*3/mm3 Final     nRBC   Date Value Ref Range Status   07/24/2024 0.0 0.0 - 0.2 /100 WBC Final     Lab Results   Component Value Date    GLUCOSE 133 (H) 07/24/2024    BUN 16 07/24/2024    CREATININE 1.03 07/24/2024    CREATININE 1.00 07/24/2024    EGFR 80.6 07/24/2024    EGFR 83.5 07/24/2024    BCR 15.5 07/24/2024    K 4.1 07/24/2024    CO2 25.0 07/24/2024    CALCIUM 8.8 07/24/2024    ALBUMIN 4.4 07/24/2024    BILITOT 0.4 07/24/2024    AST 38 07/24/2024    ALT 51 (H) 07/24/2024     MRI Brain Without Contrast    Result Date: 7/24/2024  Personally reviewed:  Small area of restricted diffusion in the right vinay (image 74 and 75) not present on flair.  D/w Dr. Hinds and Dr. Morgan.    CT Angiogram Head w AI Analysis  of LVO    Result Date: 7/24/2024  Impression: CT perfusion demonstrates no evidence of core infarct or significant territorial ischemic tissue at risk. CT angiogram demonstrates no evidence of flow-limiting stenosis, large vessel occlusion or aneurysm. Electronically Signed: Vik Kennedy MD  7/24/2024 1:41 PM EDT  Workstation ID: [id number]    CT Angiogram Neck    Result Date: 7/24/2024  Impression: CT perfusion demonstrates no evidence of core infarct or significant territorial ischemic tissue at risk. CT angiogram demonstrates no evidence of flow-limiting stenosis, large vessel occlusion or aneurysm. Electronically Signed: Vik Kennedy MD  7/24/2024 1:41 PM EDT  Workstation ID: [id number]    CT CEREBRAL PERFUSION WITH & WITHOUT CONTRAST    Result Date: 7/24/2024  Impression: CT perfusion demonstrates no evidence of core infarct or significant territorial ischemic tissue at risk. CT angiogram demonstrates no evidence of flow-limiting stenosis, large vessel occlusion or aneurysm. Electronically Signed: Vik Kennedy MD  7/24/2024 1:41 PM EDT  Workstation ID: [id number]    CT Head Without Contrast Stroke Protocol    Result Date: 7/24/2024  Impression: No evidence of acute intracranial disease. Electronically Signed: Dashawn Morgan MD  7/24/2024 1:33 PM EDT  Workstation ID: [id number]     Results for orders placed during the hospital encounter of 03/25/22    Adult Transthoracic Echo Complete W/ Cont if Necessary Per Protocol    Interpretation Summary  · Estimated left ventricular EF = 60%  · The cardiac valves are anatomically and functionally normal.           Assessment/Plan:  63-year-old, , right-handed male with known diagnosis of HTN, headaches, history of ureteral stents (s/p removal), HLD, and depression who presents with dizziness, ataxic gait and nausea.  Last known well 2 AM before going to bed.  Patient awoke at 1030 with extreme dizziness and difficulty walking accompanied by severe nausea and vomiting.   CT head was negative for acute abnormality.  CT perfusion negative.  CTA head and neck negative for flow-limiting stenosis, no LVO or aneurysm.  NIHS initially 0 though given his significant posterior circulation stroke symptoms he underwent a hyperacute MRI which showed a small stroke in the right vinay not evident on FLAIR imaging.  Ultimately patient received tPA (wake-up protocol).  Just prior to receiving tPA he had developed a left facial droop NIH of 1.    PTA antiplatelet: None  PTA anticoagulant: None      Suspected posterior circulation stroke s/p tPA  -Admit to ICU  -Ischemic stroke order set with thrombolytic therapy  -24-hour post tPA CT head tomorrow at 1430  -Post tPA precautions  -Aspirin 325 mg tomorrow if 24-hour CT head is negative for hemorrhage  -Atorvastatin 80 mg daily  -BP control; SBP <180, DBP <105; titrate Cardene as needed  -TTE with bubble  -PT/OT/SLP evals  -N.p.o. until cleared by speech  -Stroke neurology will continue to follow      Plan of care was discussed with patient, family, nursing, Dr. Hinds, Dr. Morgan, and Dr. Schaefer.  Thank you for including us in the care of this patient.      JAYLEN Orosco  July 24, 2024  13:38 EDT              Electronically signed by Dipti Weller APRN at 07/24/24 [id number]

## 2024-07-25 NOTE — PROGRESS NOTES
"INTENSIVIST NOTE     Hospital Day: 1    Mr. Kennedy Griffith, 63 y.o. male is followed for:   Acute CVA       SUBJECTIVE     Interval history:    NIH scale now 1.  Still complaining of dizziness.  Afebrile.  Awake alert and oriented.  Normal sinus rhythm.    The patient's relevant past medical, surgical and social history were reviewed and updated in Epic as appropriate.       OBJECTIVE     Vital Sign Min/Max for last 24 hours  Temp  Min: 97.9 °F (36.6 °C)  Max: 98.6 °F (37 °C)   BP  Min: 105/95  Max: 170/89   Pulse  Min: 58  Max: 80   Resp  Min: 16  Max: 18   SpO2  Min: 89 %  Max: 97 %   No data recorded   Weight  Min: 103 kg (227 lb 1.2 oz)  Max: 103 kg (227 lb 1.2 oz)      Intake/Output Summary (Last 24 hours) at 7/25/2024 1510  Last data filed at 7/25/2024 1400  Gross per 24 hour   Intake 1640 ml   Output 1250 ml   Net 390 ml      Flowsheet Rows      Flowsheet Row First Filed Value   Admission Height 180.3 cm (71\") Documented at 07/24/2024 1251   Admission Weight 103 kg (228 lb) Documented at 07/24/2024 1251               07/24/24  1251 07/25/24  1022   Weight: 103 kg (228 lb) 103 kg (227 lb 1.2 oz)            Objective:  General Appearance:  In no acute distress.    Vital signs: (most recent): Blood pressure 123/63, pulse 62, temperature 98.6 °F (37 °C), temperature source Oral, resp. rate 16, height 180.3 cm (70.98\"), weight 103 kg (227 lb 1.2 oz), SpO2 94%.    HEENT: Normal HEENT exam.    Lungs:  Normal effort and normal respiratory rate.  Breath sounds clear to auscultation.  He is not in respiratory distress.  No rales, wheezes or rhonchi.    Heart: Normal rate.  Regular rhythm.  S1 normal and S2 normal.  No murmur, gallop or friction rub.   Chest: Symmetric chest wall expansion.   Abdomen: Abdomen is soft and non-distended.  Bowel sounds are normal.   There is no abdominal tenderness.   There is no mass. There is no splenomegaly. There is no hepatomegaly.   Extremities: There is no deformity or " dependent edema.    Neurological: Patient is alert and oriented to person, place and time.    Pupils:  Pupils are equal, round, and reactive to light.    Skin:  Warm and dry.                I reviewed the patient's new clinical results.  I reviewed the patient's new imaging results/reports including actual images and agree with reports.    CT Head Without Contrast Stroke Protocol    Result Date: 7/24/2024  Impression: 1. No intracranial hemorrhage 2. No CT changes of vascular territory brain ischemia at this time Electronically Signed: Chandan Green  7/24/2024 10:39 PM EDT  Workstation ID: [id number]    XR Chest 1 View    Result Date: 7/24/2024  Impression: No new chest disease. Electronically Signed: Dashawn Morgan MD  7/24/2024 2:29 PM EDT  Workstation ID: [id number]    MRI Brain Without Contrast    Result Date: 7/24/2024  Impression: No acute infarct. Electronically Signed: Raphael Cam MD  7/24/2024 2:14 PM EDT  Workstation ID: [id number]    CT Angiogram Head w AI Analysis of LVO    Result Date: 7/24/2024  Impression: CT perfusion demonstrates no evidence of core infarct or significant territorial ischemic tissue at risk. CT angiogram demonstrates no evidence of flow-limiting stenosis, large vessel occlusion or aneurysm. Electronically Signed: Vik Kennedy MD  7/24/2024 1:41 PM EDT  Workstation ID: [id number]    CT Angiogram Neck    Result Date: 7/24/2024  Impression: CT perfusion demonstrates no evidence of core infarct or significant territorial ischemic tissue at risk. CT angiogram demonstrates no evidence of flow-limiting stenosis, large vessel occlusion or aneurysm. Electronically Signed: Vik Kennedy MD  7/24/2024 1:41 PM EDT  Workstation ID: [id number]    CT CEREBRAL PERFUSION WITH & WITHOUT CONTRAST    Result Date: 7/24/2024  Impression: CT perfusion demonstrates no evidence of core infarct or significant territorial ischemic tissue at risk. CT angiogram demonstrates no evidence of flow-limiting stenosis, large  vessel occlusion or aneurysm. Electronically Signed: Vik Kennedy MD  7/24/2024 1:41 PM EDT  Workstation ID: [id number]    CT Head Without Contrast Stroke Protocol    Result Date: 7/24/2024  Impression: No evidence of acute intracranial disease. Electronically Signed: Dashawn Morgan MD  7/24/2024 1:33 PM EDT  Workstation ID: [id number]      INFUSIONS  niCARdipine, 5-15 mg/hr        Results from last 7 days   Lab Units 07/24/24  1305   WBC 10*3/mm3 7.98   HEMOGLOBIN g/dL 15.6   HEMOGLOBIN, POC g/dL 15.3   HEMATOCRIT % 46.5   HEMATOCRIT POC % 45   PLATELETS 10*3/mm3 247     Results from last 7 days   Lab Units 07/24/24  1305   SODIUM mmol/L 139   POTASSIUM mmol/L 4.1   CHLORIDE mmol/L 106   CO2 mmol/L 25.0   BUN mg/dL 16   GLUCOSE mg/dL 133*   CREATININE mg/dL 1.00  1.03   CALCIUM mg/dL 8.8   ALBUMIN g/dL 4.4               Patient isn't on Tube Feeding   /h  Patient doesn't have any tube feeding modular orders    Mechanical Ventilator:   Settings: Observed:                                                I reviewed the patient's medications.    Assessment & Plan   ASSESSMENT/PLAN     Active Hospital Problems    Diagnosis     **Stroke     Mixed hyperlipidemia        63-year-old male with a past medical history significant for hypertension, chronic tension headaches, dyslipidemia, and mood disorder.  He awoke on 7/24 with dizziness, nausea, and unsteady gait.  He presented to the ER later that day and CT of the head was negative for acute intracranial abnormality.  CT perfusion negative.  CTA head neck negative for flow-limiting stenosis.  Given his symptoms concerning for a stroke he underwent a hyperacute MRI which revealed a small stroke in the right vinay.  He ultimately underwent TNK.    This morning has ongoing dizziness though NIH scale now 1.    Plan:    Continue neurochecks  24-hour head CT after TNK this afternoon  Aspirin if appropriate after the above  Statin  PT/OT  Echocardiogram  Blood pressure within standard  parameters       I discussed the patient's findings and my recommendations with patient and nursing staff     Plan of care and goals reviewed with multidisciplinary team at daily rounds.    .    Tim Lugo MD  Pulmonary and Critical Care Medicine  07/25/24 15:10 EDT

## 2024-07-25 NOTE — PLAN OF CARE
Goal Outcome Evaluation:            Neuro: alert and oriented  Cardiac: NSR sbp <180, dsp <105  Resp: on RA.   /GI: voids spontaneously  No skin issues noted.     Slight left droop noted at rest when pt is not smiling.

## 2024-07-25 NOTE — PLAN OF CARE
Goal Outcome Evaluation:         Mr Griffith has had stable vitals that are within ordered parameters throughout the night without PRN medications to maintain, patient has not complained of a headache tonight but has been complaining of severe dizziness and nausea and was medicated as ordered. Mr Griffith had one episode of vomiting 300mL's due to this dizziness and nausea, see charting for MD notification of events. UOP adequate. Otherwise uneventful shift.

## 2024-07-25 NOTE — THERAPY EVALUATION
Patient Name: Kennedy Griffith  : 1960    MRN: [id number]                              Today's Date: 2024       Admit Date: 2024    Visit Dx:     ICD-10-CM ICD-9-CM   1. Dizziness  R42 780.4   2. Acute stroke due to ischemia  I63.9 434.91   3. Thrombolytic medication administered within last 5 days  Z78.9 V49.89   4. Dysphagia, unspecified type  R13.10 787.20     Patient Active Problem List   Diagnosis    Anxiety    PTSD (post-traumatic stress disorder)    Mixed hyperlipidemia    Vitamin D deficiency    Nephrolithiasis    Gross hematuria    Stroke     Past Medical History:   Diagnosis Date    Depression     Fatigue     chronic    History of kidney stones     Hyperlipidemia     Sleepiness     Tension headache      Past Surgical History:   Procedure Laterality Date    LIVER BIOPSY      benign    URETEROSCOPY LASER LITHOTRIPSY WITH STENT INSERTION Right 2022    Procedure: CYSTOSCOPY, RIGHT URETEROSCOPY RETROGRADE WITH STENT INSERTION;  Surgeon: Tej Christine MD;  Location: [facility] OR;  Service: Urology;  Laterality: Right;    URETEROSCOPY LASER LITHOTRIPSY WITH STENT INSERTION Right 2022    Procedure: URETEROSCOPY LASER LITHOTRIPSY WITH STENT INSERTION RIGHT;  Surgeon: Tej Christine MD;  Location: [facility] OR;  Service: Urology;  Laterality: Right;      General Information       Row Name 24 1109          Physical Therapy Time and Intention    Document Type evaluation  -AC     Mode of Treatment physical therapy  -AC       Row Name 24 1109          General Information    Patient Profile Reviewed yes  -AC     Prior Level of Function independent:;community mobility;gait;transfer;bed mobility;ADL's  -AC     Existing Precautions/Restrictions fall;other (see comments)  Post thrombolytic precautions, bleeding precautions  -AC     Barriers to Rehab medically complex  -AC       Row Name 24 1109          Living Environment    People in Home spouse  -AC       Row Name 24  1109          Home Main Entrance    Number of Stairs, Main Entrance one  -AC     Stair Railings, Main Entrance none  -AC       Row Name 07/25/24 1109          Stairs Within Home, Primary    Number of Stairs, Within Home, Primary seven  -AC     Stair Railings, Within Home, Primary railing on left side (ascending)  -       Row Name 07/25/24 1109          Cognition    Orientation Status (Cognition) oriented x 4  -AC       Row Name 07/25/24 1109          Safety Issues, Functional Mobility    Safety Issues Affecting Function (Mobility) awareness of need for assistance;insight into deficits/self-awareness  -     Impairments Affecting Function (Mobility) balance;coordination;endurance/activity tolerance  -               User Key  (r) = Recorded By, (t) = Taken By, (c) = Cosigned By      Initials Name Provider Type    AC Ashley Oropeza, PT Physical Therapist                   Mobility       Row Name 07/25/24 1112          Bed Mobility    Bed Mobility supine-sit  -AC     Supine-Sit [place] (Bed Mobility) standby assist  -     Comment, (Bed Mobility) Pt was able to transition to sitting EOB w/o physical assist however reported significant dizziness/ nausea upon sitting  -[facility] Name 07/25/24 1112          Bed-Chair Transfer    Bed-Chair [place] (Transfers) moderate assist (50% patient effort);2 person assist  -       Row Name 07/25/24 1112          Sit-Stand Transfer    Sit-Stand [place] (Transfers) minimum assist (75% patient effort);2 person assist  -       Row Name 07/25/24 1112          Gait/Stairs (Locomotion)    [place] Level (Gait) moderate assist (50% patient effort);2 person assist  -     Patient was able to Ambulate yes  -AC     Distance in Feet (Gait) 8  -AC     Deviations/Abnormal Patterns (Gait) gait speed decreased;base of support, narrow;weight shifting decreased  -AC     Comment, (Gait/Stairs) Pt ambulated with modAx2 forwards and backwards demonstrating poor dynamic  balance and significant dizziness.  -               User Key  (r) = Recorded By, (t) = Taken By, (c) = Cosigned By      Initials Name Provider Type     Ashley Oropeza PT Physical Therapist                   Obj/Interventions       Row Name 07/25/24 1116          Range of Motion Comprehensive    General Range of Motion bilateral lower extremity ROM WFL  -[facility] Name 07/25/24 1116          Strength Comprehensive (MMT)    General Manual Muscle Testing (MMT) Assessment lower extremity strength deficits identified  -     Comment, General Manual Muscle Testing (MMT) Assessment BLE's grossly 4/5  -       Row Name 07/25/24 1116          Balance    Balance Assessment sitting static balance;sitting dynamic balance;sit to stand dynamic balance;standing static balance;standing dynamic balance  -     Static Sitting Balance standby assist  -     Dynamic Sitting Balance contact guard  -     Position, Sitting Balance sitting edge of bed;unsupported  -     Sit to Stand Dynamic Balance minimal assist;2-person assist  -     Static Standing Balance minimal assist;2-person assist  -AC     Dynamic Standing Balance moderate assist;2-person assist  -AC     Position/Device Used, Standing Balance supported  -     Balance Interventions sitting;standing;sit to stand;supported;static;dynamic;occupation based/functional task  -       Row Name 07/25/24 1116          Sensory Assessment (Somatosensory)    Sensory Assessment (Somatosensory) LE sensation intact  -               User Key  (r) = Recorded By, (t) = Taken By, (c) = Cosigned By      Initials Name Provider Type     Ashley Oropeza PT Physical Therapist                   Goals/Plan       Row Name 07/25/24 1125          Transfer Goal 1 (PT)    Activity/Assistive Device (Transfer Goal 1, PT) bed-to-chair/chair-to-bed  -     [place] Level/Cues Needed (Transfer Goal 1, PT) standby assist  -     Time Frame (Transfer Goal 1, PT) short term goal (STG);5 days   -AC       Row Name 07/25/24 1125          Gait Training Goal 1 (PT)    Activity/Assistive Device (Gait Training Goal 1, PT) gait (walking locomotion)  -AC     [place] Level (Gait Training Goal 1, PT) contact guard required  -AC     Distance (Gait Training Goal 1, PT) 150  -AC     Time Frame (Gait Training Goal 1, PT) long term goal (LTG);10 days  -       Row Name 07/25/24 1125          Therapy Assessment/Plan (PT)    Planned Therapy Interventions (PT) balance training;bed mobility training;gait training;home exercise program;neuromuscular re-education;patient/family education;transfer training;strengthening  -               User Key  (r) = Recorded By, (t) = Taken By, (c) = Cosigned By      Initials Name Provider Type    AC Ashley Oropeza, PT Physical Therapist                   Clinical Impression       Row Name 07/25/24 1120          Pain    Pretreatment Pain Rating 0/10 - no pain  -AC     Posttreatment Pain Rating 0/10 - no pain  -AC     Pain Intervention(s) Repositioned;Ambulation/increased activity  -       Row Name 07/25/24 1120          Plan of Care Review    Plan of Care Reviewed With patient  -     Progress improving  -     Outcome Evaluation Pt presents with decreased dynamic balance and endurance resulting in decreased functional mobility from baseline. Pt would benefit from IRF upon d/c to address functional deficits prior to returning home  -       Row Name 07/25/24 1120          Therapy Assessment/Plan (PT)    Rehab Potential (PT) good, to achieve stated therapy goals  -     Criteria for Skilled Interventions Met (PT) yes  -AC     Therapy Frequency (PT) daily  -     Predicted Duration of Therapy Intervention (PT) 10 days  -       Row Name 07/25/24 1120          Vital Signs    Pre Systolic BP Rehab 125  -AC     Pre Treatment Diastolic BP 66  -AC     Post Systolic BP Rehab 173  -AC     Post Treatment Diastolic BP 93  -AC     Pretreatment Heart Rate (beats/min) 63  -AC      Posttreatment Heart Rate (beats/min) 67  -AC     Pre SpO2 (%) 98  -AC     O2 Delivery Pre Treatment room air  -AC     Post SpO2 (%) 97  -AC     O2 Delivery Post Treatment room air  -AC     Pre Patient Position Supine  -AC     Intra Patient Position Standing  -AC     Post Patient Position Sitting  -AC       Row Name 07/25/24 1120          Positioning and Restraints    Pre-Treatment Position in bed  -AC     Post Treatment Position chair  -AC     In Chair notified nsg;sitting;call light within reach;encouraged to call for assist;exit alarm on;LUE elevated;waffle cushion  -AC               User Key  (r) = Recorded By, (t) = Taken By, (c) = Cosigned By      Initials Name Provider Type    AC Ashley Oropeza, PT Physical Therapist                   Outcome Measures       Row Name 07/25/24 1127 07/25/24 1000       How much help from another person do you currently need...    Turning from your back to your side while in flat bed without using bedrails? 3  -AC 3  -MT    Moving from lying on back to sitting on the side of a flat bed without bedrails? 3  -AC 3  -MT    Moving to and from a bed to a chair (including a wheelchair)? 2  -AC 3  -MT    Standing up from a chair using your arms (e.g., wheelchair, bedside chair)? 3  -AC 3  -MT    Climbing 3-5 steps with a railing? 2  -AC 3  -MT    To walk in hospital room? 2  -AC 3  -MT    AM-PAC 6 Clicks Score (PT) 15  -AC 18  -MT    Highest Level of Mobility Goal 4 --> Transfer to chair/commode  -AC 6 --> Walk 10 steps or more  -MT      Row Name 07/25/24 0001          How much help from another person do you currently need...    Turning from your back to your side while in flat bed without using bedrails? 4  -JV     Moving from lying on back to sitting on the side of a flat bed without bedrails? 4  -JV     Moving to and from a bed to a chair (including a wheelchair)? 4  -JV     Standing up from a chair using your arms (e.g., wheelchair, bedside chair)? 4  -JV     Climbing 3-5 steps with a  railing? 4  -JV     To walk in hospital room? 4  -JV     AM-PAC 6 Clicks Score (PT) 24  -JV     Highest Level of Mobility Goal 8 --> Walked 250 feet or more  -JV       Row Name 07/25/24 1127 07/25/24 1112       Modified [place] Scale    Pre-Stroke Modified Barb Scale 6 - Unable to determine (UTD) from the medical record documentation  -AC 6 - Unable to determine (UTD) from the medical record documentation  -KL    Modified [place] Scale 4 - Moderately severe disability.  Unable to walk without assistance, and unable to attend to own bodily needs without assistance.  -AC 4 - Moderately severe disability.  Unable to walk without assistance, and unable to attend to own bodily needs without assistance.  -KL      Row Name 07/25/24 1127 07/25/24 1112       Functional Assessment    Outcome Measure Options AM-PAC 6 Clicks Basic Mobility (PT);Modified [place]  -AC AM-PAC 6 Clicks Daily Activity (OT);Modified Barb  -KL              User Key  (r) = Recorded By, (t) = Taken By, (c) = Cosigned By      Initials Name Provider Type    Aylin Webb RN Registered Nurse    Dina Soriano, RN Registered Nurse    eLlia Juarez, OT Occupational Therapist    Ashley Hirsch, PT Physical Therapist                                 Physical Therapy Education       Title: PT OT SLP Therapies (In Progress)       Topic: Physical Therapy (Done)       Point: Mobility training (Done)       Learning Progress Summary             Patient Acceptance, E, VU by  at 7/25/2024 1128                         Point: Home exercise program (Done)       Learning Progress Summary             Patient Acceptance, E, VU by  at 7/25/2024 1128                         Point: Body mechanics (Done)       Learning Progress Summary             Patient Acceptance, E, VU by  at 7/25/2024 1128                         Point: Precautions (Done)       Learning Progress Summary             Patient Acceptance, E, VU by  at 7/25/2024 1128                                          User Key       Initials Effective Dates Name Provider Type Discipline    AC 07/11/24 -  Aslhey Oropeza, PT Physical Therapist PT                  PT Recommendation and Plan  Planned Therapy Interventions (PT): balance training, bed mobility training, gait training, home exercise program, neuromuscular re-education, patient/family education, transfer training, strengthening  Plan of Care Reviewed With: patient  Progress: improving  Outcome Evaluation: Pt presents with decreased dynamic balance and endurance resulting in decreased functional mobility from baseline. Pt would benefit from IRF upon d/c to address functional deficits prior to returning home     Time Calculation:   PT Evaluation Complexity  History, PT Evaluation Complexity: 1-2 personal factors and/or comorbidities  Examination of Body Systems (PT Eval Complexity): total of 3 or more elements  Clinical Presentation (PT Evaluation Complexity): evolving  Clinical Decision Making (PT Evaluation Complexity): moderate complexity  Overall Complexity (PT Evaluation Complexity): moderate complexity     PT Charges       Row Name 07/25/24 1130             Time Calculation    Start Time 1020  -AC      PT Received On 07/25/24  -AC      PT Goal Re-Cert Due Date 08/04/24  -AC         Time Calculation- PT    Total Timed Code Minutes- PT 8 minute(s)  -AC         Timed Charges    28579 - PT Therapeutic Activity Minutes 8  -AC         Untimed Charges    PT Eval/Re-eval Minutes 31  -AC         Total Minutes    Timed Charges Total Minutes 8  -AC      Untimed Charges Total Minutes 31  -AC       Total Minutes 39  -AC                User Key  (r) = Recorded By, (t) = Taken By, (c) = Cosigned By      Initials Name Provider Type    AC Ashley Oropeza, PT Physical Therapist                  Therapy Charges for Today       Code Description Service Date Service Provider Modifiers Qty    27343221183 HC PT THERAPEUTIC ACT EA 15 MIN 7/25/2024 Ashley Oropeza, PT GP 1     [id number]  PT EVAL MOD COMPLEXITY 3 7/25/2024 Ashley Oropeza, PT GP 1            PT G-Codes  Outcome Measure Options: AM-PAC 6 Clicks Basic Mobility (PT), Modified [place]  AM-PAC 6 Clicks Score (PT): 15  AM-PAC 6 Clicks Score (OT): 17  Modified [place] Scale: 4 - Moderately severe disability.  Unable to walk without assistance, and unable to attend to own bodily needs without assistance.  PT Discharge Summary  Anticipated Discharge Disposition (PT): inpatient rehabilitation facility    Ashley Oropeza PT  7/25/2024

## 2024-07-25 NOTE — CONSULTS
Diabetes Education    Patient Name:  Kennedy Griffith  YOB: 1960  MRN: [id number]  Admit Date:  7/24/2024        Order criteria not met for diabetes education consult. Current A1c is 5.7, noted during chart review. Pt has no history of DM and no home meds for DM. Thank you.      Electronically signed by:  Arlen Myrick RN  07/25/24 10:01 EDT

## 2024-07-25 NOTE — PLAN OF CARE
Goal Outcome Evaluation:  Plan of Care Reviewed With: patient        Progress: no change  Outcome Evaluation: Pt presents to OT evaluation w/ deficits in functional balance, activity tolerance, coordination and self-care. Pt would benefit from IPOT services to address deficits in order to progress towards PLOF. d/c rec is IPR.      Anticipated Discharge Disposition (OT): inpatient rehabilitation facility

## 2024-07-25 NOTE — THERAPY RE-EVALUATION
Acute Care - Speech Language Pathology   Swallow Re-Evaluation [facility]  Clinical Swallow Re-Evaluation  Cognitive-Communication Evaluation     Patient Name: Kennedy Griffith  : 1960  MRN: [id number]  Today's Date: 2024               Admit Date: 2024    Visit Dx:     ICD-10-CM ICD-9-CM   1. Dizziness  R42 780.4   2. Acute stroke due to ischemia  I63.9 434.91   3. Thrombolytic medication administered within last 5 days  Z78.9 V49.89   4. Dysphagia, unspecified type  R13.10 787.20     Patient Active Problem List   Diagnosis    Anxiety    PTSD (post-traumatic stress disorder)    Mixed hyperlipidemia    Vitamin D deficiency    Nephrolithiasis    Gross hematuria    Stroke     Past Medical History:   Diagnosis Date    Depression     Fatigue     chronic    History of kidney stones     Hyperlipidemia     Sleepiness     Tension headache      Past Surgical History:   Procedure Laterality Date    LIVER BIOPSY      benign    URETEROSCOPY LASER LITHOTRIPSY WITH STENT INSERTION Right 2022    Procedure: CYSTOSCOPY, RIGHT URETEROSCOPY RETROGRADE WITH STENT INSERTION;  Surgeon: Tej Christine MD;  Location: [facility] OR;  Service: Urology;  Laterality: Right;    URETEROSCOPY LASER LITHOTRIPSY WITH STENT INSERTION Right 2022    Procedure: URETEROSCOPY LASER LITHOTRIPSY WITH STENT INSERTION RIGHT;  Surgeon: Tej Christine MD;  Location: [facility] OR;  Service: Urology;  Laterality: Right;       SLP Recommendation and Plan  SLP Swallowing Diagnosis: swallow WFL/no suspected pharyngeal impairment (24 1530)  SLP Diet Recommendation: regular textures, thin liquids (24 1530)  Recommended Precautions and Strategies: general aspiration precautions (24 1530)  SLP Rec. for Method of Medication Administration: as tolerated (24 1530)     Monitor for Signs of Aspiration: notify SLP if any concerns (24 1530)  Recommended Diagnostics: reassess via clinical swallow evaluation (24  "1615)  Swallow Criteria for Skilled Therapeutic Interventions Met: no problems identified which require skilled intervention, baseline status (07/25/24 1530)  Anticipated Discharge Disposition (SLP): home (07/25/24 1530)     Therapy Frequency (Swallow): evaluation only (07/25/24 1530)  Predicted Duration Therapy Intervention (Days): 1 week (07/25/24 1530)  Oral Care Recommendations: Oral Care BID/PRN, Toothbrush (07/24/24 1615)                                        Plan of Care Reviewed With: patient, significant other      SWALLOW EVALUATION (Last 72 Hours)       SLP Adult Swallow Evaluation       Row Name 07/25/24 1530 07/24/24 1615                Rehab Evaluation    Document Type re-evaluation;evaluation  -SM evaluation  -SM       Subjective Information no complaints  -SM no complaints  -SM       Patient Observations alert;cooperative  -SM alert;cooperative  -SM       Patient Effort good  -SM --          General Information    Patient Profile Reviewed yes  -SM yes  -SM       Pertinent History Of Current Problem -- Admit dizziness and ataxia, s/p IV tPA. R vinay infarct. Pt also reports hx uvulaplasty with intermittent dysphagia \"if not careful\". RN stopped SLP, has completed dysphagia screen. To document shortly.  -SM       Current Method of Nutrition regular textures;thin liquids  -SM NPO  -SM       Plans/Goals Discussed with -- patient;spouse/S.O.;agreed upon  -SM       Barriers to Rehab -- none identified  -SM       Patient's Goals for Discharge -- return to PO diet  -SM       Family Goals for Discharge -- patient able to return to PO diet  -SM          Pain    Additional Documentation -- Pain Scale: Numbers Pre/Post-Treatment (Group)  -SM          Pain Scale: Numbers Pre/Post-Treatment    Pretreatment Pain Rating 3/10  -SM 0/10 - no pain  -SM       Posttreatment Pain Rating 3/10  -SM 0/10 - no pain  -SM       Pain Location - head  -SM --       Pre/Posttreatment Pain Comment RN aware and managing  -SM --       "    Oral Musculature and Cranial Nerve Assessment    Oral Labial or Buccal Impairment, Detail, Cranial Nerve VII (Facial): -- left labial droop  -          Clinical Swallow Eval    Oral Prep Phase WFL  -SM WFL  -SM       Oral Transit WFL  -SM WFL  -SM       Oral Residue WFL  -SM WFL  -SM       Pharyngeal Phase no overt signs/symptoms of pharyngeal impairment  - suspected pharyngeal impairment  -SM       Esophageal Phase unremarkable  - unremarkable  -       Clinical Swallow Evaluation Summary Utilizing baseline precautions. No signs or reports of difficulty.  -SM Cough when took liquid mix with regular solid. Pt reports it slipped away from him and velum surgery has prevented his ability to prevent from falling into his throat. Repeat trials and no s/s aspiration when pt swallowed solid before taking sips. Pt with good awareness and insight and this does appear to be chronic in nature. Will recheck tomorrow to ensure no acute change  -          SLP Evaluation Clinical Impression    SLP Swallowing Diagnosis swallow WFL/no suspected pharyngeal impairment  - R/O pharyngeal dysphagia  -       Swallow Criteria for Skilled Therapeutic Interventions Met no problems identified which require skilled intervention;baseline status  - --          Recommendations    Therapy Frequency (Swallow) evaluation only  - --       SLP Diet Recommendation regular textures;thin liquids  - regular textures;thin liquids  -       Recommended Diagnostics -- reassess via clinical swallow evaluation  -       Recommended Precautions and Strategies general aspiration precautions  - general aspiration precautions  -       Oral Care Recommendations -- Oral Care BID/PRN;Toothbrush  -       SLP Rec. for Method of Medication Administration as tolerated  - as tolerated  -       Monitor for Signs of Aspiration notify SLP if any concerns  - yes;notify SLP if any concerns;other (see comments)  any concerns, make NPO x meds  and ice chips and notify SLP  -SM                 User Key  (r) = Recorded By, (t) = Taken By, (c) = Cosigned By      Initials Name Effective Dates    Jennifer Ochoa MS CCC-SLP 02/03/23 -                     EDUCATION  The patient has been educated in the following areas:   Dysphagia (Swallowing Impairment) Modified Diet Instruction.        SLP GOALS       Row Name 07/25/24 1530             Patient will demonstrate functional cognitive-linguistic skills for return to discharge environment    [place] Independently  -SM      Time frame 1 week  -         SLP Diagnostic Treatment     Patient will participate in further assessment in the following areas reading comprehension;graphic expression;higher-level cognitive-linguistic  -SM      Time Frame (Diagnostic) 1 week  -                User Key  (r) = Recorded By, (t) = Taken By, (c) = Cosigned By      Initials Name Provider Type    Jennifer Ochoa MS CCC-SLP Speech and Language Pathologist                         Time Calculation:    Time Calculation- SLP       Row Name 07/25/24 1606             Time Calculation- SLP    SLP Start Time 1530  -SM      SLP Received On 07/25/24  -         Untimed Charges    28425-JF Eval Speech and Production w/ Language Minutes 23  -SM      03193-SR Eval Oral Pharyng Swallow Minutes 23  -SM         Total Minutes    Untimed Charges Total Minutes 46  -SM       Total Minutes 46  -SM                User Key  (r) = Recorded By, (t) = Taken By, (c) = Cosigned By      Initials Name Provider Type    Jennifer Ochoa MS CCC-SLP Speech and Language Pathologist                    Therapy Charges for Today       Code Description Service Date Service Provider Modifiers Qty    25637624581 HC ST EVAL ORAL PHARYNG SWALLOW 2 7/24/2024 Jennifer Velazco MS CCC-SLP GN 1    20230089448 HC ST EVAL ORAL PHARYNG SWALLOW 2 7/25/2024 Jennifer Velazco MS CCC-SLP GN 1    54291841179 HC ST EVAL SPEECH AND PROD W LANG  2  2024 Jennifer Velazco MS CCC-SLP GN 1                 Jennifer Velazco MS CCC-SLP  2024   and Acute Care - Speech Language Pathology Initial Evaluation   Mike     Patient Name: Kennedy Griffith  : 1960  MRN: [id number]  Today's Date: 2024               Admit Date: 2024     Visit Dx:    ICD-10-CM ICD-9-CM   1. Dizziness  R42 780.4   2. Acute stroke due to ischemia  I63.9 434.91   3. Thrombolytic medication administered within last 5 days  Z78.9 V49.89   4. Dysphagia, unspecified type  R13.10 787.20     Patient Active Problem List   Diagnosis    Anxiety    PTSD (post-traumatic stress disorder)    Mixed hyperlipidemia    Vitamin D deficiency    Nephrolithiasis    Gross hematuria    Stroke     Past Medical History:   Diagnosis Date    Depression     Fatigue     chronic    History of kidney stones     Hyperlipidemia     Sleepiness     Tension headache      Past Surgical History:   Procedure Laterality Date    LIVER BIOPSY      benign    URETEROSCOPY LASER LITHOTRIPSY WITH STENT INSERTION Right 2022    Procedure: CYSTOSCOPY, RIGHT URETEROSCOPY RETROGRADE WITH STENT INSERTION;  Surgeon: Tej Christine MD;  Location: [facility];  Service: Urology;  Laterality: Right;    URETEROSCOPY LASER LITHOTRIPSY WITH STENT INSERTION Right 2022    Procedure: URETEROSCOPY LASER LITHOTRIPSY WITH STENT INSERTION RIGHT;  Surgeon: Tej Christine MD;  Location: [facility] OR;  Service: Urology;  Laterality: Right;       SLP Recommendation and Plan  SLP Diagnosis: functional cognitive-linguistic skills (24)  SLP Diagnosis Comments: at simple level. High level dx tx to follow next session (24)     Monitor for Signs of Aspiration: notify SLP if any concerns (24)  Swallow Criteria for Skilled Therapeutic Interventions Met: no problems identified which require skilled intervention, baseline status (24)  SLC Criteria for Skilled Therapy  Interventions Met: yes (07/25/24 1530)  Anticipated Discharge Disposition (SLP): home (07/25/24 1530)     Therapy Frequency (Swallow): evaluation only (07/25/24 1530)  Therapy Frequency (SLP SLC): PRN, 5 days per week (07/25/24 1530)  Predicted Duration Therapy Intervention (Days): 1 week (07/25/24 1530)  Oral Care Recommendations: Oral Care BID/PRN, Toothbrush (07/24/24 0752)                          Plan of Care Reviewed With: patient, significant other (07/25/24 1606)      SLP EVALUATION (Last 72 Hours)       SLP SLC Evaluation       Row Name 07/25/24 1530                   General Information    Prior Level of Function-Communication [facility]  -        Plans/Goals Discussed with patient;spouse/S.O.;agreed upon  -        Barriers to Rehab none identified  -        Patient's Goals for Discharge patient did not state  -        Family Goals for Discharge family did not state  -           Comprehension Assessment/Intervention    Comprehension Assessment/Intervention Auditory Comprehension  -           Auditory Comprehension Assessment/Intervention    Answers Questions (Communication) yes/no;wh questions;personal;simple;L  -        Able to Follow Commands (Communication) 1-step;L  -        Narrative Discourse conversational level;L  -           Expression Assessment/Intervention    Expression Assessment/Intervention verbal expression  -           Verbal Expression Assessment/Intervention    Conversational Discourse/Fluency L  -           Oral Musculature and Cranial Nerve Assessment    Oral Labial or Buccal Impairment, Detail, Cranial Nerve VII (Facial): left labial droop  -           Motor Speech Assessment/Intervention    Motor Speech Function L  -           Cursory Voice Assessment/Intervention    Quality and Resonance (Voice) L  -           Cognitive Assessment Intervention- SLP    Orientation Status (Cognition) L  -        Attention (Cognitive) selective;sustained;L  -SM         Problem Solving (Cognitive) simple;WFL  -        Cognition, Comment Pt not feeling well s/p CT head. Will hold higher level assessment until next seession. Pt reports has not noticed any difficulties. Agrees for SLP to return.  -           SLP Evaluation Clinical Impressions    SLP Diagnosis functional cognitive-linguistic skills  -        SLP Diagnosis Comments at simple level. High level dx tx to follow next session  -        Rehab Potential/Prognosis good  -[facility] Criteria for Skilled Therapy Interventions Met yes  -           Recommendations    Therapy Frequency (SLP SLC) PRN;5 days per week  -        Predicted Duration Therapy Intervention (Days) 1 week  -        Anticipated Discharge Disposition (SLP) home  -                  User Key  (r) = Recorded By, (t) = Taken By, (c) = Cosigned By      Initials Name Effective Dates    Jennifer Ochoa MS CCC-SLP 02/03/23 -                        EDUCATION  The patient has been educated in the following areas:     Cognitive Impairment Communication Impairment.           SLP GOALS       Row Name 07/25/24 1530             Patient will demonstrate functional cognitive-linguistic skills for return to discharge environment    [place] Independently  -      Time frame 1 week  -         SLP Diagnostic Treatment     Patient will participate in further assessment in the following areas reading comprehension;graphic expression;higher-level cognitive-linguistic  -      Time Frame (Diagnostic) 1 week  -                User Key  (r) = Recorded By, (t) = Taken By, (c) = Cosigned By      Initials Name Provider Type    Jennifer Ochoa MS CCC-SLP Speech and Language Pathologist                              Time Calculation:      Time Calculation- SLP       Row Name 07/25/24 1606             Time Calculation- SLP    SLP Start Time 1530  -      SLP Received On 07/25/24  -         Untimed Charges    21422-WB [facility] Speech and  Production w/ Language Minutes 23  -SM      59100-HH Eval Oral Pharyng Swallow Minutes 23  -SM         Total Minutes    Untimed Charges Total Minutes 46  -SM       Total Minutes 46  -SM                User Key  (r) = Recorded By, (t) = Taken By, (c) = Cosigned By      Initials Name Provider Type    Jennifer Ochoa, MS CCC-SLP Speech and Language Pathologist                    Therapy Charges for Today       Code Description Service Date Service Provider Modifiers Qty    10569781306 HC ST EVAL ORAL PHARYNG SWALLOW 2 7/24/2024 Jennifer Velazco, MS CCC-SLP GN 1    [id number] HC ST EVAL ORAL PHARYNG SWALLOW 2 7/25/2024 Jennifer Velazco, MS CCC-SLP GN 1    [id number] HC ST EVAL SPEECH AND PROD W LANG  2 7/25/2024 Jennifer Velazco, MS CCC-SLP GN 1                       Jennifer Velazco MS CCC-SLP  7/25/2024

## 2024-07-25 NOTE — THERAPY EVALUATION
Patient Name: Kennedy Griffith  : 1960    MRN: [id number]                              Today's Date: 2024       Admit Date: 2024    Visit Dx:     ICD-10-CM ICD-9-CM   1. Dizziness  R42 780.4   2. Acute stroke due to ischemia  I63.9 434.91   3. Thrombolytic medication administered within last 5 days  Z78.9 V49.89   4. Dysphagia, unspecified type  R13.10 787.20     Patient Active Problem List   Diagnosis    Anxiety    PTSD (post-traumatic stress disorder)    Mixed hyperlipidemia    Vitamin D deficiency    Nephrolithiasis    Gross hematuria    Stroke     Past Medical History:   Diagnosis Date    Depression     Fatigue     chronic    History of kidney stones     Hyperlipidemia     Sleepiness     Tension headache      Past Surgical History:   Procedure Laterality Date    LIVER BIOPSY      benign    URETEROSCOPY LASER LITHOTRIPSY WITH STENT INSERTION Right 2022    Procedure: CYSTOSCOPY, RIGHT URETEROSCOPY RETROGRADE WITH STENT INSERTION;  Surgeon: Tej Christine MD;  Location: [facility] OR;  Service: Urology;  Laterality: Right;    URETEROSCOPY LASER LITHOTRIPSY WITH STENT INSERTION Right 2022    Procedure: URETEROSCOPY LASER LITHOTRIPSY WITH STENT INSERTION RIGHT;  Surgeon: Tej Christine MD;  Location: [facility] OR;  Service: Urology;  Laterality: Right;      General Information       Row Name 24 1105          OT Time and Intention    Document Type evaluation  -     Mode of Treatment occupational therapy  -       Row Name 24 1105          General Information    Patient Profile Reviewed yes  -KL     Prior Level of Function independent:;all household mobility;community mobility;gait;transfer;bed mobility;ADL's  -KL     Existing Precautions/Restrictions fall  poor dynamic balance; dizziness w/ position change  -     Barriers to Rehab medically complex  -       Row Name 24 1105          Living Environment    People in Home spouse  -       Row Name 24 1105           Home Main Entrance    Number of Stairs, Main Entrance one  -       Row Name 07/25/24 1105          Stairs Within Home, Primary    Number of Stairs, Within Home, Primary seven  -     Stair Railings, Within Home, Primary railing on left side (ascending)  -       Row Name 07/25/24 1105          Cognition    Orientation Status (Cognition) oriented x 3  -       Row Name 07/25/24 1105          Safety Issues, Functional Mobility    Safety Issues Affecting Function (Mobility) awareness of need for assistance;insight into deficits/self-awareness;safety precaution awareness;safety precautions follow-through/compliance  -     Impairments Affecting Function (Mobility) balance;coordination;endurance/activity tolerance;motor planning  -               User Key  (r) = Recorded By, (t) = Taken By, (c) = Cosigned By      Initials Name Provider Type    Lelia Juarez OT Occupational Therapist                     Mobility/ADL's       Row Name 07/25/24 1106          Bed Mobility    Bed Mobility supine-sit  -     Supine-Sit Cloud (Bed Mobility) supervision  -     Assistive Device (Bed Mobility) head of bed elevated;bed rails  -[facility] Name 07/25/24 1106          Transfers    Transfers bed-chair transfer;sit-stand transfer;stand-sit transfer  -[facility] Name 07/25/24 1106          Bed-Chair Transfer    Bed-Chair Cloud (Transfers) moderate assist (50% patient effort);2 person assist;verbal cues  -     Assistive Device (Bed-Chair Transfers) other (see comments)  [facility] support  -       Row Name 07/25/24 1106          Sit-Stand Transfer    Sit-Stand Cloud (Transfers) verbal cues;2 person assist;minimum assist (75% patient effort)  -     Assistive Device (Sit-Stand Transfers) other (see comments)  [facility] support  -       Row Name 07/25/24 1106          Functional Mobility    Functional Mobility- Ind. Level moderate assist (50% patient effort);2 person assist required;verbal cues required  -      Functional Mobility- Device other (see comments)  [facility] support  -     Functional Mobility-Distance (Feet) --  < HH distances  -KL       Row Name 07/25/24 1106          Activities of Daily Living    BADL Assessment/Intervention lower body dressing;feeding  -KL       Row Name 07/25/24 1106          Lower Body Dressing Assessment/Training    [place] Level (Lower Body Dressing) don;socks;contact guard assist  -     Position (Lower Body Dressing) edge of bed sitting  -KL       Row Name 07/25/24 1106          Self-Feeding Assessment/Training    [place] Level (Feeding) liquids to mouth;set up  -     Position (Self-Feeding) supported sitting  -               User Key  (r) = Recorded By, (t) = Taken By, (c) = Cosigned By      Initials Name Provider Type    Lelia Juarez OT Occupational Therapist                   Obj/Interventions       Row Name 07/25/24 1107          Sensory Assessment (Somatosensory)    Sensory Assessment (Somatosensory) UE sensation intact  -KL       Row Name 07/25/24 1107          Vision Assessment/Intervention    Visual Impairment/Limitations corrective lenses full-time;WFL  -KL       Row Name 07/25/24 1107          Range of Motion Comprehensive    General Range of Motion bilateral upper extremity ROM WFL  -KL       Row Name 07/25/24 1107          Strength Comprehensive (MMT)    Comment, General Manual Muscle Testing (MMT) Assessment [facility] MMT grossly 4+/5  -KL       Row Name 07/25/24 1107          Motor Skills    Motor Skills coordination  -     Coordination bilateral;upper extremity;WFL  -KL       Row Name 07/25/24 1107          Balance    Balance Assessment sitting static balance;sitting dynamic balance;standing static balance;standing dynamic balance  -     Static Sitting Balance supervision  -     Dynamic Sitting Balance contact guard  -     Position, Sitting Balance unsupported;sitting edge of bed  -     Static Standing Balance 2-person assist;verbal cues;minimal  assist  -KL     Dynamic Standing Balance moderate assist;2-person assist;verbal cues  -     Position/Device Used, Standing Balance supported  -     Balance Interventions standing;sitting;sit to stand;supported;static;dynamic;occupation based/functional task  -               User Key  (r) = Recorded By, (t) = Taken By, (c) = Cosigned By      Initials Name Provider Type    Lelia Juarez OT Occupational Therapist                   Goals/Plan       Row Name 07/25/24 1111          Transfer Goal 1 (OT)    Activity/Assistive Device (Transfer Goal 1, OT) sit-to-stand/stand-to-sit;toilet  -     [place] Level/Cues Needed (Transfer Goal 1, OT) minimum assist (75% or more patient effort)  -     Time Frame (Transfer Goal 1, OT) long term goal (LTG);10 days  -KL     Progress/Outcome (Transfer Goal 1, OT) new goal  -       Row Name 07/25/24 1111          Toileting Goal 1 (OT)    Activity/Device (Toileting Goal 1, OT) adjust/manage clothing;perform perineal hygiene  -     [place] Level/Cues Needed (Toileting Goal 1, OT) contact guard required  -KL     Time Frame (Toileting Goal 1, OT) long term goal (LTG);10 days  -KL     Progress/Outcome (Toileting Goal 1, OT) new goal  -       Row Name 07/25/24 1111          Grooming Goal 1 (OT)    Activity/Device (Grooming Goal 1, OT) hair care;oral care;wash face, hands  -KL     [place] (Grooming Goal 1, OT) contact guard required  -KL     Time Frame (Grooming Goal 1, OT) short term goal (STG);5 days  -     Strategies/Barriers (Grooming Goal 1, OT) sink side  -KL     Progress/Outcome (Grooming Goal 1, OT) new goal  -       Row Name 07/25/24 1111          Therapy Assessment/Plan (OT)    Planned Therapy Interventions (OT) activity tolerance training;functional balance retraining;occupation/activity based interventions;patient/caregiver education/training;ROM/therapeutic exercise;strengthening exercise;transfer/mobility retraining  -               User Key   (r) = Recorded By, (t) = Taken By, (c) = Cosigned By      Initials Name Provider Type    Lelia Juarez, KENNEDY Occupational Therapist                   Clinical Impression       Row Name 07/25/24 1108          Pain Assessment    Pretreatment Pain Rating 0/10 - no pain  -     Posttreatment Pain Rating 0/10 - no pain  -     Pre/Posttreatment Pain Comment No c/o pain; c/o dizziness and nausea w/ position changes  -     Pain Intervention(s) Repositioned;Ambulation/increased activity  -[facility] Name 07/25/24 1108          Plan of Care Review    Plan of Care Reviewed With patient  -     Progress no change  -     Outcome Evaluation Pt presents to OT evaluation w/ deficits in functional balance, activity tolerance, coordination and self-care. Pt would benefit from IPOT services to address deficits in order to progress towards PLOF. d/c rec is IPR.  -[facility] Name 07/25/24 1108          Therapy Assessment/Plan (OT)    Patient/Family Therapy Goal Statement (OT) Return to PLOF  -     Rehab Potential (OT) good, to achieve stated therapy goals  -     Criteria for Skilled Therapeutic Interventions Met (OT) yes  -     Therapy Frequency (OT) daily  -     Predicted Duration of Therapy Intervention (OT) 10 days  -[facility] Name 07/25/24 1108          Therapy Plan Review/Discharge Plan (OT)    Anticipated Discharge Disposition (OT) inpatient rehabilitation facility  -[facility] Name 07/25/24 1108          Vital Signs    Pre Systolic BP Rehab 125  -KL     Pre Treatment Diastolic BP 66  -KL     Post Systolic BP Rehab 173  -KL     Post Treatment Diastolic BP 93  -KL     Pretreatment Heart Rate (beats/min) 70  -KL     Posttreatment Heart Rate (beats/min) 65  -KL     Pre SpO2 (%) 93  -KL     O2 Delivery Pre Treatment room air  -     Post SpO2 (%) 96  -KL     O2 Delivery Post Treatment room air  -KL     Pre Patient Position Supine  -KL     Intra Patient Position Standing  -KL     Post Patient Position Sitting   -       Row Name 07/25/24 1108          Positioning and Restraints    Pre-Treatment Position in bed  -KL     Post Treatment Position chair  -KL     In Chair notified nsg;reclined;sitting;call light within reach;encouraged to call for assist;exit alarm on;waffle cushion;legs elevated  -KL               User Key  (r) = Recorded By, (t) = Taken By, (c) = Cosigned By      Initials Name Provider Type    Lelia uJarez OT Occupational Therapist                   Outcome Measures       Row Name 07/25/24 1112          How much help from another is currently needed...    Putting on and taking off regular lower body clothing? 2  -KL     Bathing (including washing, rinsing, and drying) 3  -KL     Toileting (which includes using toilet bed pan or urinal) 2  -KL     Putting on and taking off regular upper body clothing 3  -KL     Taking care of personal grooming (such as brushing teeth) 3  -KL     Eating meals 4  -KL     AM-PAC 6 Clicks Score (OT) 17  -KL       Row Name 07/25/24 1000 07/25/24 0001       How much help from another person do you currently need...    Turning from your back to your side while in flat bed without using bedrails? 3  -MT 4  -JV    Moving from lying on back to sitting on the side of a flat bed without bedrails? 3  -MT 4  -JV    Moving to and from a bed to a chair (including a wheelchair)? 3  -MT 4  -JV    Standing up from a chair using your arms (e.g., wheelchair, bedside chair)? 3  -MT 4  -JV    Climbing 3-5 steps with a railing? 3  -MT 4  -JV    To walk in hospital room? 3  -MT 4  -JV    AM-PAC 6 Clicks Score (PT) 18  -MT 24  -JV    Highest Level of Mobility Goal 6 --> Walk 10 steps or more  -MT 8 --> Walked 250 feet or more  -JV      Row Name 07/25/24 1112          Modified Barb Scale    Pre-Stroke Modified Apache Scale 6 - Unable to determine (UTD) from the medical record documentation  -KL     Modified [place] Scale 4 - Moderately severe disability.  Unable to walk without assistance, and  unable to attend to own bodily needs without assistance.  -       Row Name 07/25/24 1112          Functional Assessment    Outcome Measure Options AM-PAC 6 Clicks Daily Activity (OT);Modified Dewey  -               User Key  (r) = Recorded By, (t) = Taken By, (c) = Cosigned By      Initials Name Provider Type    MT Aylin Mcdonough RN Registered Nurse    Dina Soriano RN Registered Nurse    Lelia Juarez OT Occupational Therapist                    Occupational Therapy Education       Title: PT OT SLP Therapies (In Progress)       Topic: Occupational Therapy (In Progress)       Point: ADL training (In Progress)       Description:   Instruct learner(s) on proper safety adaptation and remediation techniques during self care or transfers.   Instruct in proper use of assistive devices.                  Learning Progress Summary             Patient Acceptance, E, NR by  at 7/25/2024 1113                         Point: Home exercise program (Not Started)       Description:   Instruct learner(s) on appropriate technique for monitoring, assisting and/or progressing therapeutic exercises/activities.                  Learner Progress:  Not documented in this visit.              Point: Precautions (In Progress)       Description:   Instruct learner(s) on prescribed precautions during self-care and functional transfers.                  Learning Progress Summary             Patient Acceptance, E, NR by  at 7/25/2024 1113                         Point: Body mechanics (In Progress)       Description:   Instruct learner(s) on proper positioning and spine alignment during self-care, functional mobility activities and/or exercises.                  Learning Progress Summary             Patient Acceptance, E, NR by  at 7/25/2024 1113                                         User Key       Initials Effective Dates Name Provider Type [facility] 02/05/24 -  Lelia Perez OT Occupational Therapist OT                   OT Recommendation and Plan  Planned Therapy Interventions (OT): activity tolerance training, functional balance retraining, occupation/activity based interventions, patient/caregiver education/training, ROM/therapeutic exercise, strengthening exercise, transfer/mobility retraining  Therapy Frequency (OT): daily  Plan of Care Review  Plan of Care Reviewed With: patient  Progress: no change  Outcome Evaluation: Pt presents to OT evaluation w/ deficits in functional balance, activity tolerance, coordination and self-care. Pt would benefit from IPOT services to address deficits in order to progress towards PLOF. d/c rec is IPR.     Time Calculation:   Evaluation Complexity (OT)  Review Occupational Profile/Medical/Therapy History Complexity: brief/low complexity  Assessment, Occupational Performance/Identification of Deficit Complexity: 1-3 performance deficits  Clinical Decision Making Complexity (OT): problem focused assessment/low complexity  Overall Complexity of Evaluation (OT): low complexity     Time Calculation- OT       Row Name 07/25/24 1114             Time Calculation- OT    OT Start Time 1015  -KL      OT Received On 07/25/24  -KL      OT Goal Re-Cert Due Date 08/04/24  -KL         Timed Charges    70670 - OT Therapeutic Activity Minutes 10  -KL         Untimed Charges    OT Eval/Re-eval Minutes 35  -KL         Total Minutes    Timed Charges Total Minutes 10  -KL      Untimed Charges Total Minutes 35  -KL       Total Minutes 45  -KL                User Key  (r) = Recorded By, (t) = Taken By, (c) = Cosigned By      Initials Name Provider Type    Lelia Juarez OT Occupational Therapist                  Therapy Charges for Today       Code Description Service Date Service Provider Modifiers Qty    16154262492  OT THERAPEUTIC ACT EA 15 MIN 7/25/2024 Lelia Perez OT GO 1    10344124492 HC OT EVAL LOW COMPLEXITY 3 7/25/2024 Lelia Perez OT GO 1                 Lelia Perez OT  7/25/2024

## 2024-07-25 NOTE — PLAN OF CARE
Goal Outcome Evaluation:  Plan of Care Reviewed With: patient, significant other                  Anticipated Discharge Disposition (SLP): home    SLP Diagnosis: functional cognitive-linguistic skills (07/25/24 1530)  SLP Diagnosis Comments: at simple level. High level dx tx to follow next session (07/25/24 1530)  SLP Swallowing Diagnosis: swallow WFL/no suspected pharyngeal impairment (07/25/24 1530)

## 2024-07-25 NOTE — PROGRESS NOTES
"Stroke Neurology Progress Note     Subjective     This patient was seen in follow-up for: small right pontine infarct s/p TNK  Present for the encounter were: self, patient     Subjective:  My first encounter with the patient.  No acute events overnight.  Patient reports persistent dizziness with his eyes closed which he describes as \"room spinning\".  Patient reports 2/10 headache and denies throat swelling.  Discussed results of MRI.  He received some meclizine with uncertain effect.      Objective      Temp:  [97.7 °F (36.5 °C)-97.9 °F (36.6 °C)] 97.9 °F (36.6 °C)  Heart Rate:  [53-80] 66  Resp:  [16-18] 18  BP: (105-170)/(57-95) 122/69            Objective    Physical Exam:  General Appearance: Alert  HEENT: anicteric sclera, no scleral injection  Lungs: respirations appear comfortable, no obvious increased work of breathing  Extremities: No cyanosis or fingernail clubbing   Skin: No rashes in exposed skin areas     Neurological Examination:   Mental status: Alert and oriented. No dysarthria. Able to name and repeat.  Cranial Nerves: Visual fields intact. Extraocular movements intact with no nystagmus. Midline gaze. Face symmetric.    Sensory: Normal sensory exam to light touch.  Motor: Normal tone. Absent pronator drift.  Strength:  LUE: 5/5 biceps, triceps,   LLE: 5/5 hip flexion/extension  RUE: 5/5 biceps, triceps,   RLE:  5/5 hip flexion/extension  Cerebellar: Finger-to-nose intact. Heel-to-shin intact. Rapid alternating movements are intact.   Gait: Not assessed.     Labs:    No results found for: \"HGBA1C\"   Lab Results   Component Value Date    CHOL 238 (H) 03/06/2024    TRIG 169 (H) 03/06/2024    HDL 39 (L) 03/06/2024     (H) 03/06/2024       Lab Results   Component Value Date    WBC 7.98 07/24/2024    HGB 15.6 07/24/2024    HGB 15.3 07/24/2024    HCT 46.5 07/24/2024    HCT 45 07/24/2024    MCV 86.9 07/24/2024     07/24/2024     Lab Results   Component Value Date    GLUCOSE 133 (H) " "07/24/2024    BUN 16 07/24/2024    CREATININE 1.03 07/24/2024    CREATININE 1.00 07/24/2024    EGFRIFNONA 71 01/14/2022    EGFRIFAFRI 86 01/14/2022    BCR 15.5 07/24/2024    CO2 25.0 07/24/2024    CALCIUM 8.8 07/24/2024    ALBUMIN 4.4 07/24/2024    AST 38 07/24/2024    ALT 51 (H) 07/24/2024       Results from last 7 days   Lab Units 07/24/24  1305   SODIUM mmol/L 139   POTASSIUM mmol/L 4.1   CHLORIDE mmol/L 106   CO2 mmol/L 25.0   BUN mg/dL 16   CREATININE mg/dL 1.00  1.03   CALCIUM mg/dL 8.8   BILIRUBIN mg/dL 0.4   ALK PHOS U/L 77   ALT (SGPT) U/L 51*   AST (SGOT) U/L 38   GLUCOSE mg/dL 133*       No results found for: \"BLOODCX\"  UA          7/24/2024    14:54   Urinalysis   Specific [place], UA 1.066    Ketones, UA Negative    Blood, UA Negative    Leukocytes, UA Negative    Nitrite, UA Negative      Lab Results   Component Value Date    URINECX <10,000 CFU/mL Escherichia coli (A) 08/04/2022       Results Review:      All brain images and reports were personally reviewed and I agree with the interpretations except as noted below.    CT Head Without Contrast 7/24/2024  Impression: 1. No intracranial hemorrhage 2. No CT changes of vascular territory brain ischemia at this time     MRI Brain Without Contrast: 7/24/2024  Impression: No acute infarct.   I personally reviewed the images and I find a small right pontine infarct.  Discussed with Dr. Morgan (Radiology) prior to administering TNK who agreed small right pontine restricted diffusion.        CT Angiogram Head and Neck 7/24/2024  Impression: CT perfusion demonstrates no evidence of core infarct or significant territorial ischemic tissue at risk. CT angiogram demonstrates no evidence of flow-limiting stenosis, large vessel occlusion or aneurysm.     CT Perfusion 7/24/2024  Impression: CT perfusion demonstrates no evidence of core infarct or significant territorial ischemic tissue at risk. CT angiogram demonstrates no evidence of flow-limiting stenosis, large " vessel occlusion or aneurysm.     CT Head Without Contrast Stroke Protocol 7/24/2024  Impression: No evidence of acute intracranial disease.          Assessment/Plan     Assessment:    # Acute right pontine infarct s/p TNK (wake-up protocol)  Small vessel ischemic disease.  Patient risk factors include HTN, HLD.    -If 24-hour CT head clear, initiate aspirin 300 mg NC  -Atorvastatin 80 mg daily  -SBP <180  -TTE pending   -If symptoms persist or worsen, will consider repeat MRI to further evaluate  -PT OT when able  -Neuro-checks per unit protocol while inpatient  -DVT prophylaxis: SCD  -Follow-up in stroke clinic       Patient education: call 911 or present to emergency department with any stroke symptom, including unilateral face, arm, or leg weakness, numbness, or paresthesias, unilateral facial droop, speech deficits, dizziness with nausea, vomiting, nystagmus, and incoordination, visual deficits, or severe onset headache.    Stroke neurology will follow results of above workup.  Please call with questions.     Carline Hinds MD  [facility] STROKE NEURO  07/25/24  06:36 EDT

## 2024-07-25 NOTE — PLAN OF CARE
Goal Outcome Evaluation:  Plan of Care Reviewed With: patient        Progress: improving  Outcome Evaluation: Pt presents with decreased dynamic balance and endurance resulting in decreased functional mobility from baseline. Pt would benefit from IRF upon d/c to address functional deficits prior to returning home      Anticipated Discharge Disposition (PT): inpatient rehabilitation facility

## 2024-07-26 ENCOUNTER — READMISSION MANAGEMENT (OUTPATIENT)
Dept: CALL CENTER | Facility: HOSPITAL | Age: 64
End: 2024-07-26
Payer: COMMERCIAL

## 2024-07-26 VITALS
RESPIRATION RATE: 18 BRPM | DIASTOLIC BLOOD PRESSURE: 82 MMHG | OXYGEN SATURATION: 96 % | HEART RATE: 58 BPM | BODY MASS INDEX: 31.79 KG/M2 | TEMPERATURE: 98 F | WEIGHT: 227.07 LBS | HEIGHT: 71 IN | SYSTOLIC BLOOD PRESSURE: 141 MMHG

## 2024-07-26 LAB
ANION GAP SERPL CALCULATED.3IONS-SCNC: 10 MMOL/L (ref 5–15)
BASOPHILS # BLD AUTO: 0.05 10*3/MM3 (ref 0–0.2)
BASOPHILS NFR BLD AUTO: 0.6 % (ref 0–1.5)
BUN SERPL-MCNC: 15 MG/DL (ref 8–23)
BUN/CREAT SERPL: 16.3 (ref 7–25)
CALCIUM SPEC-SCNC: 9 MG/DL (ref 8.6–10.5)
CHLORIDE SERPL-SCNC: 105 MMOL/L (ref 98–107)
CO2 SERPL-SCNC: 26 MMOL/L (ref 22–29)
CREAT SERPL-MCNC: 0.92 MG/DL (ref 0.76–1.27)
DEPRECATED RDW RBC AUTO: 39.1 FL (ref 37–54)
EGFRCR SERPLBLD CKD-EPI 2021: 92.3 ML/MIN/1.73
EOSINOPHIL # BLD AUTO: 0.15 10*3/MM3 (ref 0–0.4)
EOSINOPHIL NFR BLD AUTO: 1.8 % (ref 0.3–6.2)
ERYTHROCYTE [DISTWIDTH] IN BLOOD BY AUTOMATED COUNT: 12.7 % (ref 12.3–15.4)
GLUCOSE SERPL-MCNC: 91 MG/DL (ref 65–99)
HCT VFR BLD AUTO: 43.9 % (ref 37.5–51)
HGB BLD-MCNC: 15.2 G/DL (ref 13–17.7)
IMM GRANULOCYTES # BLD AUTO: 0.02 10*3/MM3 (ref 0–0.05)
IMM GRANULOCYTES NFR BLD AUTO: 0.2 % (ref 0–0.5)
LYMPHOCYTES # BLD AUTO: 3.33 10*3/MM3 (ref 0.7–3.1)
LYMPHOCYTES NFR BLD AUTO: 40.2 % (ref 19.6–45.3)
MCH RBC QN AUTO: 29.2 PG (ref 26.6–33)
MCHC RBC AUTO-ENTMCNC: 34.6 G/DL (ref 31.5–35.7)
MCV RBC AUTO: 84.4 FL (ref 79–97)
MONOCYTES # BLD AUTO: 0.58 10*3/MM3 (ref 0.1–0.9)
MONOCYTES NFR BLD AUTO: 7 % (ref 5–12)
NEUTROPHILS NFR BLD AUTO: 4.16 10*3/MM3 (ref 1.7–7)
NEUTROPHILS NFR BLD AUTO: 50.2 % (ref 42.7–76)
NRBC BLD AUTO-RTO: 0 /100 WBC (ref 0–0.2)
PLATELET # BLD AUTO: 239 10*3/MM3 (ref 140–450)
PMV BLD AUTO: 9.1 FL (ref 6–12)
POTASSIUM SERPL-SCNC: 3.9 MMOL/L (ref 3.5–5.2)
RBC # BLD AUTO: 5.2 10*6/MM3 (ref 4.14–5.8)
SODIUM SERPL-SCNC: 141 MMOL/L (ref 136–145)
WBC NRBC COR # BLD AUTO: 8.29 10*3/MM3 (ref 3.4–10.8)

## 2024-07-26 PROCEDURE — 99232 SBSQ HOSP IP/OBS MODERATE 35: CPT | Performed by: NURSE PRACTITIONER

## 2024-07-26 PROCEDURE — 85025 COMPLETE CBC W/AUTO DIFF WBC: CPT | Performed by: INTERNAL MEDICINE

## 2024-07-26 PROCEDURE — 97530 THERAPEUTIC ACTIVITIES: CPT

## 2024-07-26 PROCEDURE — 99239 HOSP IP/OBS DSCHRG MGMT >30: CPT | Performed by: NURSE PRACTITIONER

## 2024-07-26 PROCEDURE — 80048 BASIC METABOLIC PNL TOTAL CA: CPT | Performed by: INTERNAL MEDICINE

## 2024-07-26 RX ORDER — ASPIRIN 325 MG
325 TABLET, DELAYED RELEASE (ENTERIC COATED) ORAL EVERY 6 HOURS PRN
Qty: 30 TABLET | Refills: 1 | Status: SHIPPED | OUTPATIENT
Start: 2024-08-17

## 2024-07-26 RX ORDER — MECLIZINE HYDROCHLORIDE 25 MG/1
25 TABLET ORAL 3 TIMES DAILY PRN
Status: DISCONTINUED | OUTPATIENT
Start: 2024-07-26 | End: 2024-07-26 | Stop reason: SDUPTHER

## 2024-07-26 RX ORDER — CLOPIDOGREL BISULFATE 75 MG/1
75 TABLET ORAL DAILY
Qty: 20 TABLET | Refills: 0 | Status: SHIPPED | OUTPATIENT
Start: 2024-07-27 | End: 2024-08-16

## 2024-07-26 RX ORDER — ATORVASTATIN CALCIUM 80 MG/1
80 TABLET, FILM COATED ORAL NIGHTLY
Qty: 90 TABLET | Refills: 2 | Status: SHIPPED | OUTPATIENT
Start: 2024-07-26

## 2024-07-26 RX ORDER — CLOPIDOGREL BISULFATE 75 MG/1
75 TABLET ORAL DAILY
Status: DISCONTINUED | OUTPATIENT
Start: 2024-07-26 | End: 2024-07-26 | Stop reason: HOSPADM

## 2024-07-26 RX ORDER — ASPIRIN 81 MG/1
81 TABLET, CHEWABLE ORAL DAILY
Status: DISCONTINUED | OUTPATIENT
Start: 2024-07-26 | End: 2024-07-26 | Stop reason: HOSPADM

## 2024-07-26 RX ORDER — MECLIZINE HYDROCHLORIDE 25 MG/1
25 TABLET ORAL 3 TIMES DAILY PRN
Qty: 6 TABLET | Refills: 0 | Status: SHIPPED | OUTPATIENT
Start: 2024-07-26

## 2024-07-26 RX ORDER — ASPIRIN 81 MG/1
81 TABLET, CHEWABLE ORAL DAILY
Qty: 20 TABLET | Refills: 0 | Status: SHIPPED | OUTPATIENT
Start: 2024-07-27 | End: 2024-08-16

## 2024-07-26 RX ADMIN — CLOPIDOGREL BISULFATE 75 MG: 75 TABLET ORAL at 08:33

## 2024-07-26 RX ADMIN — ASPIRIN 81 MG: 81 TABLET, CHEWABLE ORAL at 08:32

## 2024-07-26 NOTE — DISCHARGE INSTRUCTIONS
-Continue aspirin 81 mg daily.  STOP on 8/16/2024  -Continue Plavix 75 mg daily STOP on 8/16/2024  -Start aspirin 325 mg daily on 8/17/2024  -Continue atorvastatin 80 mg daily.  LDL cholesterol goal less than 70 for stroke prevention.   when admitted to the hospital.  -Heart healthy diet  -Blood pressure goals less than 130/80.  Please check your blood pressure twice daily and keep a log for your primary care provider.  -Return to the ED with any additional stroke symptoms

## 2024-07-26 NOTE — PLAN OF CARE
Goal Outcome Evaluation:  Plan of Care Reviewed With: patient        Progress: improving  Outcome Evaluation: Pt demo improved independence by performing t/fs w/ CGA and LBD tasks w/ SBA. Pt conts to be limited d/t deficits in dynamic standing balance warranting cont skilled IPOT POC to promote return to PLOF. Pt educated on home safety and EC/WS techniques to improve functional independence upon DC. Recommend pt DC home w/ assist, a RW, and OP PT services.      Anticipated Discharge Disposition (OT): home with assist, home with outpatient therapy services

## 2024-07-26 NOTE — PLAN OF CARE
Goal Outcome Evaluation:  Plan of Care Reviewed With: patient        Progress: improving  Outcome Evaluation: Pt increased ambulation distance to 120ft with Debbie and UE support on tele monitor. VC's for upright posture with forward gaze and increased stride length. Frequent periods of mild instability, but no LOB. Pt would benefit from use of RW for increased stability. Recommend D/C home with assistance and OP PT services.      Anticipated Discharge Disposition (PT): home with assist, home with outpatient therapy services

## 2024-07-26 NOTE — CASE MANAGEMENT/SOCIAL WORK
Case Management Discharge Note      Final Note: Spoke with patient at bedside and talked with therapy regarding discharge recommendations.  Patient has improved and states he wishes to go home and do OP PT.  OP PT order in [facility] and given to patient, along with list of OP therapy facilities near his home.  Patient will self-schedule at the facility of his choosing.  Also ordered RW per therapy recommendations.  Hema with [facility] accepted referral and [facility] will deliver the RW to bedside prior to discharge.  Bedside RN updated on d/c plan.  No other needs voiced.         Selected Continued Care - Admitted Since 7/24/2024       Destination    No services have been selected for the patient.                Durable Medical Equipment       Service Provider Selected Services Address Phone Fax Patient Preferred    [facility] [address], [place] [id number] [phone] --              Dialysis/Infusion    No services have been selected for the patient.                Home Medical Care    No services have been selected for the patient.                Therapy    No services have been selected for the patient.                Community Resources    No services have been selected for the patient.                Community & DME    No services have been selected for the patient.                         Final Discharge Disposition Code: 01 - home or self-care

## 2024-07-26 NOTE — DISCHARGE SUMMARY
Discharge Summary    Patient name: Kennedy Griffith  CSN: [id number]  MRN: [id number]  : 1960  Today's date: 2024     Date of Admission: 2024  Date of Discharge:  2024    Admitting Physician:  Rico Martin MD   Primary Care Provider: Carline Schmitt APRN  Consultations:   Dipti Weller APRN  Stroke neurology     Admission Diagnosis: Stroke s/p TNK      Discharge Diagnoses:   Active Hospital Problems    Diagnosis     **Stroke     Mixed hyperlipidemia      Allergies:  Patient has no known allergies.    Code Status and Medical Interventions: CPR (Attempt to Resuscitate); Full Support   Ordered at: 24 1511     Code Status (Patient has no pulse and is not breathing):    CPR (Attempt to Resuscitate)     Medical Interventions (Patient has pulse or is breathing):    Full Support     Procedures/Testing:  CT head   CTA head/neck   CTP  MRI   Echo        History of Present Illness:  Kennedy Griffith is a 63-year-old male with past medical history of hypertension, chronic tension headaches, dyslipidemia, mood disorder who woke up the morning of 24 with dizziness, nausea and unsteady gait who presented to the ED for ongoing symptoms concerning for a stroke.     CT head was negative for acute intracranial abnormality. CT perfusion negative. CTA head and neck negative for flow-limiting stenosis, no LVO or aneurysm. NIHS initially 0 though given his significant posterior circulation stroke symptoms he underwent a hyperacute MRI which showed a small stroke in the right vinay not evident on FLAIR imaging. Patient ultimately underwent TNK and subsequently admitted to the ICU for ongoing management observation     Hospital Course:  The patient was admitted for reasons mentioned above in the HPI and placed on the post-hemorrhagic stroke order set followed closely by stroke neurology. 24 hours post-TNK CT head negative for hemorrhagic conversion and he was implemented on DAPT as well as high  "dose statin therapy. TTE unrevealing for cardioembolic source and etiology of the stroke was felt to be secondary to small vessel ischemic disease. NIH remains a 1 for a left facial drop.     Hospital course was unrevealing and he was followed by PT/OT who recommended discharge home with home health. He was implemented on a diet without concern for aspiration.     At this time it is felt that Mr. Griffith has achieved maximum benefit from hospital therapy and is appropriate for discharge home with the medications and recommendations listed below. Per neurology, he will remain on DAPT for 21 days then go to 325 mg ASA monotherapy.     Vitals:  /82 (BP Location: Left arm, Patient Position: Sitting)   Pulse 58   Temp 98 °F (36.7 °C) (Oral)   Resp 18   Ht 180.3 cm (70.98\")   Wt 103 kg (227 lb 1.2 oz)   SpO2 96%   BMI 31.68 kg/m²     Physical Exam:  Constitutional:  Appears well-developed and well-nourished. No distress.   HEENT:  Normocephalic and atraumatic. PERRL  Neck:  Neck supple. No JVD present.   CV: Normal rate, regular rhythm,  intact distal pulses.  No gallop, murmur or rub.  Pulmonary/Chest: Effort normal and breath sounds normal. No respiratory distress. No wheezes, rhonchi or rales.   Abdominal: Soft. +BS. No distension and no mass. There is no tenderness.   Musculoskeletal: Normal muscle tone and strength  Neurological: Alert and oriented to person, place, and time.  No focal deficits  Skin: Skin is warm and dry. No rash noted.   Extremities:  No clubbing, edema or cyanosis  Psychiatric: Normal mood and affect. Behavior is normal.     Interval:  (shift change)  1a. Level of Consciousness: 0-->Alert, keenly responsive  1b. LOC Questions: 0-->Answers both questions correctly  1c. LOC Commands: 0-->Performs both tasks correctly  2. Best Gaze: 0-->Normal  3. Visual: 0-->No visual loss  4. Facial Palsy: 1-->Minor paralysis (flattened nasolabial fold, asymmetry on smiling)  5a. Motor Arm, Left: 0-->No " "drift, limb holds 90 (or 45) degrees for full 10 secs  5b. Motor Arm, Right: 0-->No drift, limb holds 90 (or 45) degrees for full 10 secs  6a. Motor Leg, Left: 0-->No drift, leg holds 30 degree position for full 5 secs  6b. Motor Leg, Right: 0-->No drift, leg holds 30 degree position for full 5 secs  7. Limb Ataxia: 0-->Absent  8. Sensory: 0-->Normal, no sensory loss  9. Best Language: 0-->No aphasia, normal  10. Dysarthria: 0-->Normal  11. Extinction and Inattention (formerly Neglect): 0-->No abnormality    Total (NIH Stroke Scale): 1    Labs:  Results from last 7 days   Lab Units 07/26/24  0624   WBC 10*3/mm3 8.29   HEMOGLOBIN g/dL 15.2   HEMATOCRIT % 43.9   PLATELETS 10*3/mm3 239     Results from last 7 days   Lab Units 07/26/24  0624 07/24/24  1305   SODIUM mmol/L 141 139   POTASSIUM mmol/L 3.9 4.1   CHLORIDE mmol/L 105 106   CO2 mmol/L 26.0 25.0   BUN mg/dL 15 16   CREATININE mg/dL 0.92 1.00  1.03   CALCIUM mg/dL 9.0 8.8   BILIRUBIN mg/dL  --  0.4   ALK PHOS U/L  --  77   ALT (SGPT) U/L  --  51*   AST (SGOT) U/L  --  38   GLUCOSE mg/dL 91 133*         No results found for: \"MG\", \"PHOS\"                 Discharge Medications        New Medications        Instructions Start Date   aspirin 81 MG chewable tablet   81 mg, Oral, Daily   Start Date: July 27, 2024     aspirin 325 MG EC tablet   325 mg, Oral, Every 6 Hours PRN   Start Date: August 17, 2024     atorvastatin 80 MG tablet  Commonly known as: LIPITOR   80 mg, Oral, Nightly      clopidogrel 75 MG tablet  Commonly known as: PLAVIX   75 mg, Oral, Daily   Start Date: July 27, 2024            Continue These Medications        Instructions Start Date   buPROPion 75 MG tablet  Commonly known as: WELLBUTRIN   1 tablet, Oral, Daily, NEEDS REFILL      busPIRone 15 MG tablet  Commonly known as: BUSPAR   15 mg, Oral, 3 Times Daily      cyclobenzaprine 10 MG tablet  Commonly known as: FLEXERIL   10 mg, Oral, Nightly PRN      DULoxetine 60 MG capsule  Commonly known as: " CYMBALTA   TAKE 1 CAPSULE BY MOUTH EVERY DAY      fexofenadine 180 MG tablet  Commonly known as: Allegra Allergy   180 mg, Oral, Daily      propranolol 20 MG tablet  Commonly known as: INDERAL   20 mg, Oral, 2 Times Daily             Stop These Medications      amphetamine-dextroamphetamine 20 MG tablet  Commonly known as: ADDERALL     rosuvastatin 20 MG tablet  Commonly known as: Crestor              Diet Instructions       Diet: Regular/House Diet, Cardiac Diets; Healthy Heart (2-3 Na+); Regular (IDDSI 7); Thin (IDDSI 0)      Discharge Diet:  Regular/House Diet  Cardiac Diets       Cardiac Diet: Healthy Heart (2-3 Na+)    Texture: Regular (IDDSI 7)    Fluid Consistency: Thin (IDDSI 0)            Activity Instructions       Activity as Tolerated              Follow-up Appointments  No future appointments.  Additional Instructions for the Follow-ups that You Need to Schedule       Ambulatory Referral to Physical Therapy   As directed      Specialty needed: Evaluate and treat Neuro   Weight Bearing Status: Full weight bearing   Follow-up needed: Yes        Discharge Follow-up with PCP   As directed       Currently Documented PCP:    Carline Schmitt APRN    PCP Phone Number:    [phone]     Follow Up Details: IN 2-4 weeks per patient convenience                Discharge Instructions:  Discharge home today   Follow-ups as above  PCP 2-4 weeks  Stroke neurology in 6 weeks   Medications as above  DAPT X 21 days then  mg monotherapy starting 8/17/24   Out-patient PT ordered per case management  Patient provided with OP facilities near their home and will self-schedule the facility of his choosing  Rolling walker ordered via [employer] and delivered to bedside prior to discharge   If symptoms worsen or recur, seek medical attention or call 911     Electronically signed by JAYLEN Ruiz, 07/26/24, 12:58 PM EDT.    Time: I spent  33  minutes on this discharge activity which included: face-to-face encounter  with the patient, reviewing the data in the system, coordination of the care with the nursing staff as well as consultants, documentation, and entering orders.      CC: Carline Schmitt APRN

## 2024-07-26 NOTE — PROGRESS NOTES
Stroke Neurology Progress Note     Subjective     This patient was seen in follow-up for: small right pontine infarct s/p TPA  Present for the encounter were: self, patient     Subjective:  Sitting up in bed eating breakfast.  No adverse events overnight.  24-hour CT negative for hemorrhage.  Exam remains stable.  NIH 1 for LFD.    Objective      Temp:  [98.1 °F (36.7 °C)-98.6 °F (37 °C)] 98.5 °F (36.9 °C)  Heart Rate:  [55-71] 60  Resp:  [15-16] 16  BP: (113-165)/() 142/79            Objective      Physical Exam  Constitutional:       General: He is not in acute distress.  HENT:      Head: Normocephalic and atraumatic.   Eyes:      Extraocular Movements: Extraocular movements intact.      Pupils: Pupils are equal, round, and reactive to light.   Cardiovascular:      Rate and Rhythm: Normal rate and regular rhythm.   Pulmonary:      Effort: Pulmonary effort is normal. No respiratory distress.   Abdominal:      General: There is no distension.      Palpations: Abdomen is soft.   Musculoskeletal:      Cervical back: Normal range of motion and neck supple.   Skin:     General: Skin is warm and dry.      Capillary Refill: Capillary refill takes less than 2 seconds.   Neurological:      Mental Status: He is alert and oriented to person, place, and time.      Cranial Nerves: Cranial nerve deficit present.      Sensory: No sensory deficit.      Motor: No weakness.      Coordination: Coordination normal.      Gait: Gait normal.      Comments: LFD, no limb drift, VFF, sensation intact, speech fluent with no aphasia             Labs:    Lab Results   Component Value Date    HGBA1C 5.70 (H) 07/25/2024      Lab Results   Component Value Date    CHOL 231 (H) 07/25/2024    TRIG 120 07/25/2024    HDL 44 07/25/2024     (H) 07/25/2024       Lab Results   Component Value Date    WBC 8.29 07/26/2024    HGB 15.2 07/26/2024    HCT 43.9 07/26/2024    MCV 84.4 07/26/2024     07/26/2024     Lab Results   Component Value  "Date    GLUCOSE 133 (H) 07/24/2024    BUN 16 07/24/2024    CREATININE 1.03 07/24/2024    CREATININE 1.00 07/24/2024    EGFRIFNONA 71 01/14/2022    EGFRIFAFRI 86 01/14/2022    BCR 15.5 07/24/2024    CO2 25.0 07/24/2024    CALCIUM 8.8 07/24/2024    ALBUMIN 4.4 07/24/2024    AST 38 07/24/2024    ALT 51 (H) 07/24/2024       Results from last 7 days   Lab Units 07/24/24  1305   SODIUM mmol/L 139   POTASSIUM mmol/L 4.1   CHLORIDE mmol/L 106   CO2 mmol/L 25.0   BUN mg/dL 16   CREATININE mg/dL 1.00  1.03   CALCIUM mg/dL 8.8   BILIRUBIN mg/dL 0.4   ALK PHOS U/L 77   ALT (SGPT) U/L 51*   AST (SGOT) U/L 38   GLUCOSE mg/dL 133*       No results found for: \"BLOODCX\"  UA          7/24/2024    14:54   Urinalysis   Specific [place], UA 1.066    Ketones, UA Negative    Blood, UA Negative    Leukocytes, UA Negative    Nitrite, UA Negative      Lab Results   Component Value Date    URINECX <10,000 CFU/mL Escherichia coli (A) 08/04/2022       Results Review:      All brain images and reports were personally reviewed and I agree with the interpretations except as noted below.    CT Head Without Contrast 7/24/2024  Impression: 1. No intracranial hemorrhage 2. No CT changes of vascular territory brain ischemia at this time     MRI Brain Without Contrast: 7/24/2024  Impression: No acute infarct.   I personally reviewed the images and I find a small right pontine infarct.  Discussed with Dr. Morgan (Radiology) prior to administering TNK who agreed small right pontine restricted diffusion.        CT Angiogram Head and Neck 7/24/2024  Impression: CT perfusion demonstrates no evidence of core infarct or significant territorial ischemic tissue at risk. CT angiogram demonstrates no evidence of flow-limiting stenosis, large vessel occlusion or aneurysm.     CT Perfusion 7/24/2024  Impression: CT perfusion demonstrates no evidence of core infarct or significant territorial ischemic tissue at risk. CT angiogram demonstrates no evidence of " flow-limiting stenosis, large vessel occlusion or aneurysm.     CT Head Without Contrast Stroke Protocol 7/24/2024  Impression: No evidence of acute intracranial disease.          Assessment/Plan     Assessment:    # Acute right pontine infarct s/p TPA (wake-up protocol)  -Likely etiology small vessel ischemic disease.  Patient risk factors include HTN, HLD ().  -24-hour CT s/p tPA negative for hemorrhage  -Received aspirin 325 mg yesterday.  DC aspirin 325 mg  -Start aspirin 81 mg daily, Plavix 75 mg daily for 21 days (8/16/2024), followed by aspirin 325 mg (8/17/2024) monotherapy  -Atorvastatin 80 mg daily  -Heart healthy diet  -Ok to normalize BP goals. Management per primary team  -TTE with negative saline tests, No LAE  -If symptoms persist or worsen, will consider repeat MRI to further evaluate  -PT OT IPR recommended at d/c  -Neuro-checks per unit protocol while inpatient  -DVT prophylaxis: SCD  -Ok for telemetry from a neurology perspective.  Okay for discharge from a neurology perspective once DC disposition is established.  -Follow-up in stroke clinic in 6 weeks  -Plan discussed with Dr. Hinds, the patient, and primary team.  Stroke Neurology will follow peripherally and see as needed while admitted. Please call with any questions or concerns.     -Discussed the importance of medication compliance Plavix 75mg daily, Aspirin 81mg daily, and Atorvastatin 80mg nightly and lifestyle modifications adequate control of blood pressure, adequate control of cholesterol (goal LDL <70), adequate control of glucose (<140, A1c goal <7), increased physical activity, and implementation of healthy diet to help reduce the risk of future cerebrovascular events.  Also discussed the signs symptoms that would warrant the patient return back to the emergency department including unilateral weakness, unilateral numbness, visual disturbances, loss of balance, speech difficulties, and/or a sudden severe headache.  Patient  verbalized understanding.    JAYLEN Bey, AGACNP-[facility] STROKE NEURO  07/26/24  07:31 EDT

## 2024-07-26 NOTE — OUTREACH NOTE
Prep Survey      Flowsheet Row Responses   [facility] patient discharged from? [place]   Is LACE score < 7 ? No   Eligibility [facility]   Date of Admission 07/24/24   Date of Discharge 07/26/24   Discharge Disposition Home or Self Care   Discharge diagnosis Stroke   Does the patient have one of the following disease processes/diagnoses(primary or secondary)? Stroke   Does the patient have Home health ordered? No   Is there a DME ordered? Yes   What DME was ordered? Walker--[employer]   Comments regarding appointments Ambulatory Referral to Neurology,  Ambulatory Referral to Physical Therapy   Medication alerts for this patient see AVS   Prep survey completed? Yes            Sandra ANDERSON - Registered Nurse

## 2024-07-26 NOTE — THERAPY TREATMENT NOTE
Patient Name: Kennedy Griffith  : 1960    MRN: [id number]                              Today's Date: 2024       Admit Date: 2024    Visit Dx:     ICD-10-CM ICD-9-CM   1. Dizziness  R42 780.4   2. Acute stroke due to ischemia  I63.9 434.91   3. Thrombolytic medication administered within last 5 days  Z78.9 V49.89   4. Dysphagia, unspecified type  R13.10 787.20   5. Cerebrovascular accident (CVA), unspecified mechanism  I63.9 434.91     Patient Active Problem List   Diagnosis    Anxiety    PTSD (post-traumatic stress disorder)    Mixed hyperlipidemia    Vitamin D deficiency    Nephrolithiasis    Gross hematuria    Stroke     Past Medical History:   Diagnosis Date    Depression     Fatigue     chronic    History of kidney stones     Hyperlipidemia     Sleepiness     Tension headache      Past Surgical History:   Procedure Laterality Date    LIVER BIOPSY      benign    URETEROSCOPY LASER LITHOTRIPSY WITH STENT INSERTION Right 2022    Procedure: CYSTOSCOPY, RIGHT URETEROSCOPY RETROGRADE WITH STENT INSERTION;  Surgeon: Tej Christine MD;  Location: [facility];  Service: Urology;  Laterality: Right;    URETEROSCOPY LASER LITHOTRIPSY WITH STENT INSERTION Right 2022    Procedure: URETEROSCOPY LASER LITHOTRIPSY WITH STENT INSERTION RIGHT;  Surgeon: Tej Christine MD;  Location: [facility] OR;  Service: Urology;  Laterality: Right;      General Information       Row Name 24 1310          OT Time and Intention    Document Type therapy note (daily note)  -CS     Mode of Treatment occupational therapy  -CS       Row Name 24 1310          General Information    Existing Precautions/Restrictions fall  -CS     Barriers to Rehab medically complex  -CS       Row Name 24 1310          Cognition    Orientation Status (Cognition) oriented x 4  -CS       Row Name 24 1310          Safety Issues, Functional Mobility    Impairments Affecting Function (Mobility) balance;coordination  -CS                User Key  (r) = Recorded By, (t) = Taken By, (c) = Cosigned By      Initials Name Provider Type    CS Radha Mejía OT Occupational Therapist                     Mobility/ADL's       Row Name 07/26/24 1310          Bed Mobility    Bed Mobility supine-sit  -CS     Supine-Sit [place] (Bed Mobility) supervision;verbal cues  -CS     Assistive Device (Bed Mobility) bed rails;head of bed elevated  -       Row Name 07/26/24 1310          Transfers    Transfers sit-stand transfer;stand-sit transfer  -CS       Row Name 07/26/24 1310          Bed-Chair Transfer    Bed-Chair [place] (Transfers) contact guard;verbal cues  -CS       Row Name 07/26/24 1310          Sit-Stand Transfer    Sit-Stand [place] (Transfers) contact guard;verbal cues  -CS       Row Name 07/26/24 1310          Stand-Sit Transfer    Stand-Sit [place] (Transfers) contact guard;verbal cues  -CS       Row Name 07/26/24 1310          Activities of Daily Living    BADL Assessment/Intervention feeding;grooming;lower body dressing  -       Row Name 07/26/24 1310          Lower Body Dressing Assessment/Training    [place] Level (Lower Body Dressing) don;socks;standby assist  -CS     Position (Lower Body Dressing) sitting up in bed  -       Row Name 07/26/24 1310          Self-Feeding Assessment/Training    [place] Level (Feeding) liquids to mouth;set up  -CS     Position (Self-Feeding) supported sitting  -               User Key  (r) = Recorded By, (t) = Taken By, (c) = Cosigned By      Initials Name Provider Type    Radha Conway OT Occupational Therapist                   Obj/Interventions       Row Name 07/26/24 1311          Sensory Assessment (Somatosensory)    Sensory Assessment (Somatosensory) UE sensation intact  -       Row Name 07/26/24 1311          Vision Assessment/Intervention    Visual Impairment/Limitations corrective lenses full-time;WFL  -       Row Name 07/26/24 1311          Range of  Motion Comprehensive    General Range of Motion bilateral upper extremity ROM WFL  -       Row Name 07/26/24 1311          Strength Comprehensive (MMT)    Comment, General Manual Muscle Testing (MMT) Assessment BUE grossly L  -       Row Name 07/26/24 1311          Motor Skills    Motor Skills coordination  -     Coordination bilateral;upper extremity;finger to nose;L  -       Row Name 07/26/24 1311          Balance    Balance Assessment sitting static balance;sitting dynamic balance;standing static balance;standing dynamic balance  -     Static Sitting Balance supervision  -     Dynamic Sitting Balance supervision  -     Position, Sitting Balance sitting edge of bed  -     Static Standing Balance standby assist  -     Dynamic Standing Balance contact guard  -     Position/Device Used, Standing Balance supported;unsupported  -     Balance Interventions sitting;standing;static;dynamic;dynamic reaching;occupation based/functional task;weight shifting activity  -               User Key  (r) = Recorded By, (t) = Taken By, (c) = Cosigned By      Initials Name Provider Type    CS Radha Mejía OT Occupational Therapist                   Goals/Plan    No documentation.                  Clinical Impression       Row Name 07/26/24 1312          Pain Assessment    Pretreatment Pain Rating 3/10  -CS     Posttreatment Pain Rating 3/10  -CS     Pain Location - head  -CS     Pre/Posttreatment Pain Comment tolerated  -     Pain Intervention(s) Repositioned;Ambulation/increased activity  -       Row Name 07/26/24 1312          Plan of Care Review    Plan of Care Reviewed With patient  -     Progress improving  -     Outcome Evaluation Pt demo improved independence by performing t/fs w/ CGA and LBD tasks w/ SBA. Pt conts to be limited d/t deficits in dynamic standing balance warranting cont skilled IPOT POC to promote return to PLOF. Pt educated on home safety and EC/WS techniques to improve  functional independence upon DC. Recommend pt DC home w/ assist, a RW, and OP PT services.  -CS       Row Name 07/26/24 1312          Therapy Plan Review/Discharge Plan (OT)    Anticipated Discharge Disposition (OT) home with assist;home with outpatient therapy services  -CS       Row Name 07/26/24 1312          Vital Signs    Pre Systolic BP Rehab 129  -CS     Pre Treatment Diastolic BP 71  -CS     Post Systolic BP Rehab 143  -CS     Post Treatment Diastolic BP 76  -CS     Pretreatment Heart Rate (beats/min) 58  -CS     Posttreatment Heart Rate (beats/min) 80  -CS     Pre SpO2 (%) 96  -CS     O2 Delivery Pre Treatment room air  -CS     Post SpO2 (%) 97  -CS     O2 Delivery Post Treatment room air  -CS     Pre Patient Position Supine  -CS     Intra Patient Position Standing  -CS     Post Patient Position Sitting  -CS       Row Name 07/26/24 1312          Positioning and Restraints    Pre-Treatment Position in bed  -CS     Post Treatment Position other  -CS     Other Position with PT  -CS               User Key  (r) = Recorded By, (t) = Taken By, (c) = Cosigned By      Initials Name Provider Type    CS Radha Mejía, OT Occupational Therapist                   Outcome Measures       Row Name 07/26/24 1314          How much help from another is currently needed...    Putting on and taking off regular lower body clothing? 3  -CS     Bathing (including washing, rinsing, and drying) 3  -CS     Toileting (which includes using toilet bed pan or urinal) 3  -CS     Putting on and taking off regular upper body clothing 3  -CS     Taking care of personal grooming (such as brushing teeth) 4  -CS     Eating meals 4  -CS     AM-PAC 6 Clicks Score (OT) 20  -CS       Row Name 07/26/24 0800          How much help from another person do you currently need...    Turning from your back to your side while in flat bed without using bedrails? 4  -TB     Moving from lying on back to sitting on the side of a flat bed without bedrails? 4   -TB     Moving to and from a bed to a chair (including a wheelchair)? 3  -TB     Standing up from a chair using your arms (e.g., wheelchair, bedside chair)? 3  -TB     Climbing 3-5 steps with a railing? 2  -TB     To walk in hospital room? 2  -TB     AM-PAC 6 Clicks Score (PT) 18  -TB     Highest Level of Mobility Goal 6 --> Walk 10 steps or more  -TB       Row Name 07/26/24 1314          Modified [place] Scale    Modified [place] Scale 2 - Slight disability.  Unable to carry out all previous activities but able to look after own affairs without assistance.  -CS       Row Name 07/26/24 1314          Functional Assessment    Outcome Measure Options AM-PAC 6 Clicks Daily Activity (OT)  -               User Key  (r) = Recorded By, (t) = Taken By, (c) = Cosigned By      Initials Name Provider Type    CS Radha Mejía, OT Occupational Therapist    TB Ashli Green, RN Registered Nurse                    Occupational Therapy Education       Title: PT OT SLP Therapies (In Progress)       Topic: Occupational Therapy (In Progress)       Point: ADL training (Done)       Description:   Instruct learner(s) on proper safety adaptation and remediation techniques during self care or transfers.   Instruct in proper use of assistive devices.                  Learning Progress Summary             Patient Acceptance, E, VU by CS at 7/26/2024 1315    Acceptance, E, NR by  at 7/25/2024 1113   Family Acceptance, E, VU by CS at 7/26/2024 1315                         Point: Home exercise program (Not Started)       Description:   Instruct learner(s) on appropriate technique for monitoring, assisting and/or progressing therapeutic exercises/activities.                  Learner Progress:  Not documented in this visit.              Point: Precautions (Done)       Description:   Instruct learner(s) on prescribed precautions during self-care and functional transfers.                  Learning Progress Summary             Patient  Acceptance, E, VU by  at 7/26/2024 1315    Acceptance, E, NR by  at 7/25/2024 1113   Family Acceptance, E, VU by  at 7/26/2024 1315                         Point: Body mechanics (Done)       Description:   Instruct learner(s) on proper positioning and spine alignment during self-care, functional mobility activities and/or exercises.                  Learning Progress Summary             Patient Acceptance, E, VU by  at 7/26/2024 1315    Acceptance, E, NR by  at 7/25/2024 1113   Family Acceptance, E, VU by  at 7/26/2024 1315                                         User Key       Initials Effective Dates Name Provider Type Discipline     09/02/21 -  Radha Mejía OT Occupational Therapist OT    KATELYNN 02/05/24 -  Lelia Perez OT Occupational Therapist OT                  OT Recommendation and Plan     Plan of Care Review  Plan of Care Reviewed With: patient  Progress: improving  Outcome Evaluation: Pt demo improved independence by performing t/fs w/ CGA and LBD tasks w/ SBA. Pt conts to be limited d/t deficits in dynamic standing balance warranting cont skilled IPOT POC to promote return to PLOF. Pt educated on home safety and EC/WS techniques to improve functional independence upon DC. Recommend pt DC home w/ assist, a RW, and OP PT services.     Time Calculation:         Time Calculation- OT       Row Name 07/26/24 1315             Time Calculation- OT    OT Start Time 1037  -CS      OT Received On 07/26/24  -CS      OT Goal Re-Cert Due Date 08/04/24  -CS         Timed Charges    75446 - OT Therapeutic Activity Minutes 5  -CS      16716 - OT Self Care/Mgmt Minutes 5  -CS         Total Minutes    Timed Charges Total Minutes 10  -CS       Total Minutes 10  -CS                User Key  (r) = Recorded By, (t) = Taken By, (c) = Cosigned By      Initials Name Provider Type    CS Radha Mejía OT Occupational Therapist                  Therapy Charges for Today       Code Description Service Date Service  Provider Modifiers Qty    [id number]  OT THERAPEUTIC ACT EA 15 MIN 7/26/2024 Radha Mejía OT GO 1                 Radha Mejía OT  7/26/2024

## 2024-07-26 NOTE — THERAPY TREATMENT NOTE
Patient Name: Kennedy Griffith  : 1960    MRN: [id number]                              Today's Date: 2024       Admit Date: 2024    Visit Dx:     ICD-10-CM ICD-9-CM   1. Dizziness  R42 780.4   2. Acute stroke due to ischemia  I63.9 434.91   3. Thrombolytic medication administered within last 5 days  Z78.9 V49.89   4. Dysphagia, unspecified type  R13.10 787.20   5. Cerebrovascular accident (CVA), unspecified mechanism  I63.9 434.91     Patient Active Problem List   Diagnosis    Anxiety    PTSD (post-traumatic stress disorder)    Mixed hyperlipidemia    Vitamin D deficiency    Nephrolithiasis    Gross hematuria    Stroke     Past Medical History:   Diagnosis Date    Depression     Fatigue     chronic    History of kidney stones     Hyperlipidemia     Sleepiness     Tension headache      Past Surgical History:   Procedure Laterality Date    LIVER BIOPSY      benign    URETEROSCOPY LASER LITHOTRIPSY WITH STENT INSERTION Right 2022    Procedure: CYSTOSCOPY, RIGHT URETEROSCOPY RETROGRADE WITH STENT INSERTION;  Surgeon: Tej Christine MD;  Location: [facility];  Service: Urology;  Laterality: Right;    URETEROSCOPY LASER LITHOTRIPSY WITH STENT INSERTION Right 2022    Procedure: URETEROSCOPY LASER LITHOTRIPSY WITH STENT INSERTION RIGHT;  Surgeon: Tej Christine MD;  Location: [facility] OR;  Service: Urology;  Laterality: Right;      General Information       Row Name 24 1503          Physical Therapy Time and Intention    Document Type therapy note (daily note)  -KG     Mode of Treatment physical therapy  -KG       Row Name 24 1503          General Information    Existing Precautions/Restrictions fall  -KG     Barriers to Rehab medically complex  -KG       Row Name 24 1503          Cognition    Orientation Status (Cognition) oriented x 4  -KG       Row Name 24 1503          Safety Issues, Functional Mobility    Safety Issues Affecting Function (Mobility) awareness of  need for assistance;insight into deficits/self-awareness;safety precaution awareness;safety precautions follow-through/compliance  -KG     Impairments Affecting Function (Mobility) balance;coordination;endurance/activity tolerance;postural/trunk control  -KG               User Key  (r) = Recorded By, (t) = Taken By, (c) = Cosigned By      Initials Name Provider Type    Rosy Gamble PT Physical Therapist                   Mobility       Row Name 07/26/24 1504          Bed Mobility    Comment, (Bed Mobility) UIC  -KG       Row Name 07/26/24 1504          Transfers    Comment, (Transfers) VC's for sequencing.  -KG       Row Name 07/26/24 1504          Sit-Stand Transfer    Sit-Stand [place] (Transfers) contact guard;verbal cues  -KG       Row Name 07/26/24 1504          Gait/Stairs (Locomotion)    [place] Level (Gait) minimum assist (75% patient effort);verbal cues  -KG     Assistive Device (Gait) other (see comments)  UE support on tele monitor  -KG     Distance in Feet (Gait) 120  -KG     Deviations/Abnormal Patterns (Gait) base of support, narrow;kyaw decreased;stride length decreased  -KG     Bilateral Gait Deviations heel strike decreased  -KG     Comment, (Gait/Stairs) Pt demonstrated step through gait pattern with slow kyaw and decreased step length. Pt with narrow DELORES; tendency to point LEs toward the L with intermittent lateral lean to the R. Pt with periods of mild instability, but no LOB. Recommend use of RW for increased stability.  -KG               User Key  (r) = Recorded By, (t) = Taken By, (c) = Cosigned By      Initials Name Provider Type    Rosy Gamble PT Physical Therapist                   Obj/Interventions       Row Name 07/26/24 1506          Balance    Balance Assessment sitting static balance;standing static balance;standing dynamic balance  -KG     Static Sitting Balance supervision  -KG     Position, Sitting Balance unsupported;sitting edge of bed   -KG     Static Standing Balance standby assist  -KG     Dynamic Standing Balance contact guard  -KG     Position/Device Used, Standing Balance supported  -KG               User Key  (r) = Recorded By, (t) = Taken By, (c) = Cosigned By      Initials Name Provider Type    KG Rosy Terrell N, PT Physical Therapist                   Goals/Plan    No documentation.                  Clinical Impression       Row Name 07/26/24 1506          Pain    Pretreatment Pain Rating 0/10 - no pain  -KG     Posttreatment Pain Rating 0/10 - no pain  -KG       Row Name 07/26/24 1506          Plan of Care Review    Plan of Care Reviewed With patient  -KG     Progress improving  -KG     Outcome Evaluation Pt increased ambulation distance to 120ft with Debbie and UE support on tele monitor. VC's for upright posture with forward gaze and increased stride length. Frequent periods of mild instability, but no LOB. Pt would benefit from use of RW for increased stability. Recommend D/C home with assistance and OP PT services.  -KG       Row Name 07/26/24 1506          Vital Signs    Pre Systolic BP Rehab 129  -KG     Pre Treatment Diastolic BP 71  -KG     Post Systolic BP Rehab 150  -KG     Post Treatment Diastolic BP 83  -KG     Pretreatment Heart Rate (beats/min) 64  -KG     Posttreatment Heart Rate (beats/min) 62  -KG     Pre SpO2 (%) 95  -KG     O2 Delivery Pre Treatment room air  -KG     Post SpO2 (%) 96  -KG     O2 Delivery Post Treatment room air  -KG     Pre Patient Position Sitting  -KG     Intra Patient Position Standing  -KG     Post Patient Position Sitting  -KG       Row Name 07/26/24 1506          Positioning and Restraints    Pre-Treatment Position in bed  -KG     Post Treatment Position chair  -KG     In Chair notified nsg;reclined;call light within reach;encouraged to call for assist;exit alarm on;legs elevated  -KG               User Key  (r) = Recorded By, (t) = Taken By, (c) = Cosigned By      Initials Name Provider Type     Rosy Gamble, PT Physical Therapist                   Outcome Measures       Row Name 07/26/24 1508 07/26/24 0800       How much help from another person do you currently need...    Turning from your back to your side while in flat bed without using bedrails? 4  -KG 4  -TB    Moving from lying on back to sitting on the side of a flat bed without bedrails? 3  -KG 4  -TB    Moving to and from a bed to a chair (including a wheelchair)? 3  -KG 3  -TB    Standing up from a chair using your arms (e.g., wheelchair, bedside chair)? 3  -KG 3  -TB    Climbing 3-5 steps with a railing? 2  -KG 2  -TB    To walk in hospital room? 3  -KG 2  -TB    AM-PAC 6 Clicks Score (PT) 18  -KG 18  -TB    Highest Level of Mobility Goal 6 --> Walk 10 steps or more  -KG 6 --> Walk 10 steps or more  -TB      Row Name 07/26/24 1314          Modified [place] Scale    Modified [place] Scale 2 - Slight disability.  Unable to carry out all previous activities but able to look after own affairs without assistance.  -CS       Row Name 07/26/24 1508 07/26/24 1314       Functional Assessment    Outcome Measure Options AM-PAC 6 Clicks Basic Mobility (PT)  -KG AM-PAC 6 Clicks Daily Activity (OT)  -CS              User Key  (r) = Recorded By, (t) = Taken By, (c) = Cosigned By      Initials Name Provider Type    Radha Conway OT Occupational Therapist    Ashli Meraz, RN Registered Nurse    Rosy Gamble, PT Physical Therapist                                 Physical Therapy Education       Title: PT OT SLP Therapies (Done)       Topic: Physical Therapy (Done)       Point: Mobility training (Done)       Learning Progress Summary             Patient Acceptance, E, VU,DU by KG at 7/26/2024 1050    Acceptance, E, VU by AC at 7/25/2024 1128                         Point: Home exercise program (Done)       Learning Progress Summary             Patient Acceptance, E, VU,DU by KG at 7/26/2024 1050    Acceptance, E, VU by AC at  7/25/2024 1128                         Point: Body mechanics (Done)       Learning Progress Summary             Patient Acceptance, E, VU,DU by KG at 7/26/2024 1050    Acceptance, E, VU by AC at 7/25/2024 1128                         Point: Precautions (Done)       Learning Progress Summary             Patient Acceptance, E, VU,DU by KG at 7/26/2024 1050    Acceptance, E, VU by AC at 7/25/2024 1128                                         User Key       Initials Effective Dates Name Provider Type Discipline    KG 05/22/20 -  Rosy Terrell PT Physical Therapist PT    AC 07/11/24 -  Ashley Oropeza PT Physical Therapist PT                  PT Recommendation and Plan     Plan of Care Reviewed With: patient  Progress: improving  Outcome Evaluation: Pt increased ambulation distance to 120ft with Debbie and UE support on tele monitor. VC's for upright posture with forward gaze and increased stride length. Frequent periods of mild instability, but no LOB. Pt would benefit from use of RW for increased stability. Recommend D/C home with assistance and OP PT services.     Time Calculation:         PT Charges       Row Name 07/26/24 1050             Time Calculation    Start Time 1050  -KG      PT Received On 07/26/24  -KG      PT Goal Re-Cert Due Date 08/04/24  -KG         Time Calculation- PT    Total Timed Code Minutes- PT 10 minute(s)  -KG         Timed Charges    80104 - PT Therapeutic Activity Minutes 10  -KG         Total Minutes    Timed Charges Total Minutes 10  -KG       Total Minutes 10  -KG                User Key  (r) = Recorded By, (t) = Taken By, (c) = Cosigned By      Initials Name Provider Type    KG Rosy Terrell, PT Physical Therapist                  Therapy Charges for Today       Code Description Service Date Service Provider Modifiers Qty    98940276436  PT THERAPEUTIC ACT EA 15 MIN 7/26/2024 Rosy Terrell, PT GP 1            PT G-Codes  Outcome Measure Options: AM-PAC 6 Clicks Basic  Mobility (PT)  AM-PAC 6 Clicks Score (PT): 18  AM-PAC 6 Clicks Score (OT): 20  Modified [place] Scale: 2 - Slight disability.  Unable to carry out all previous activities but able to look after own affairs without assistance.  PT Discharge Summary  Anticipated Discharge Disposition (PT): home with assist, home with outpatient therapy services    Gissell Terrell, PT  7/26/2024

## 2024-07-29 ENCOUNTER — TRANSITIONAL CARE MANAGEMENT TELEPHONE ENCOUNTER (OUTPATIENT)
Dept: CALL CENTER | Facility: HOSPITAL | Age: 64
End: 2024-07-29
Payer: COMMERCIAL

## 2024-07-29 NOTE — OUTREACH NOTE
Call Center TCM Note      Flowsheet Row Responses   [facility] patient discharged from? [place]   Does the patient have one of the following disease processes/diagnoses(primary or secondary)? Stroke   TCM attempt successful? No   Unsuccessful attempts Attempt 2  [Attempted to reach patient and wife, Paulina, listed on PCP verbal release. No answer]            Kimmy Yanes RN    7/29/2024, 15:57 EDT

## 2024-07-29 NOTE — PAYOR COMM NOTE
"Sherry Griffith (63 y.o. Male)       Date of Birth   1960    Social Security Number       Address   [address] [place], [place] [address]    Home Phone   [phone]    MRN   [id number]       Anabaptism   None    Marital Status                               Admission Date   24    Admission Type   Emergency    Admitting Provider   Rico Martin MD    Attending Provider       Department, Room/Bed   [facility] 2B ICU, N229/1       Discharge Date   2024    Discharge Disposition   Home or Self Care    Discharge Destination                                 Attending Provider: (none)   Allergies: No Known Allergies    Isolation: None   Infection: None   Code Status: Prior    Ht: 180.3 cm (70.98\")   Wt: 103 kg (227 lb 1.2 oz)    Admission Cmt: None   Principal Problem: Stroke [I63.9]                   Active Insurance as of 2024       Primary Coverage       Payor Plan Insurance Group Employer/Plan Group    [employer]IG[employer] CIGNA [id number]       Payor Plan Address Payor Plan Phone Number Payor Plan Fax Number Effective Dates    [address] [phone]  2021 - None Entered    [facility] [id number]         Subscriber Name Subscriber Birth Date Member ID       SHERRY GRIFFITH 1960 [id number]                     Emergency Contacts        (Rel.) Home Phone Work Phone Mobile Phone    BERTIN GRIFFITH (Spouse) [phone] -- [phone]                 Discharge Summary        Brynn Clark APRN at 24 1431       Attestation signed by Tim Lugo MD at 24 1529    I have reviewed this documentation and agree.                  Discharge Summary    Patient name: Sherry Griffith  CSN: [id number]  MRN: [id number]  : 1960  Today's date: 2024     Date of Admission: 2024  Date of Discharge:  2024    Admitting Physician:  Rico Martin MD   Primary Care Provider: Carline Schmitt APRN  Consultations: "   Dipti Weller APRN  Stroke neurology     Admission Diagnosis: Stroke s/p TNK      Discharge Diagnoses:   Active Hospital Problems    Diagnosis     **Stroke     Mixed hyperlipidemia      Allergies:  Patient has no known allergies.    Code Status and Medical Interventions: CPR (Attempt to Resuscitate); Full Support   Ordered at: 07/25/24 1511     Code Status (Patient has no pulse and is not breathing):    CPR (Attempt to Resuscitate)     Medical Interventions (Patient has pulse or is breathing):    Full Support     Procedures/Testing:  CT head   CTA head/neck   CTP  MRI   Echo        History of Present Illness:  Kennedy Griffith is a 63-year-old male with past medical history of hypertension, chronic tension headaches, dyslipidemia, mood disorder who woke up the morning of 7/26/24 with dizziness, nausea and unsteady gait who presented to the ED for ongoing symptoms concerning for a stroke.     CT head was negative for acute intracranial abnormality. CT perfusion negative. CTA head and neck negative for flow-limiting stenosis, no LVO or aneurysm. NIHS initially 0 though given his significant posterior circulation stroke symptoms he underwent a hyperacute MRI which showed a small stroke in the right vinay not evident on FLAIR imaging. Patient ultimately underwent TNK and subsequently admitted to the ICU for ongoing management observation     Hospital Course:  The patient was admitted for reasons mentioned above in the HPI and placed on the post-hemorrhagic stroke order set followed closely by stroke neurology. 24 hours post-TNK CT head negative for hemorrhagic conversion and he was implemented on DAPT as well as high dose statin therapy. TTE unrevealing for cardioembolic source and etiology of the stroke was felt to be secondary to small vessel ischemic disease. NIH remains a 1 for a left facial drop.     Hospital course was unrevealing and he was followed by PT/OT who recommended discharge home with home health. He  "was implemented on a diet without concern for aspiration.     At this time it is felt that Mr. Griffith has achieved maximum benefit from hospital therapy and is appropriate for discharge home with the medications and recommendations listed below. Per neurology, he will remain on DAPT for 21 days then go to 325 mg ASA monotherapy.     Vitals:  /82 (BP Location: Left arm, Patient Position: Sitting)   Pulse 58   Temp 98 °F (36.7 °C) (Oral)   Resp 18   Ht 180.3 cm (70.98\")   Wt 103 kg (227 lb 1.2 oz)   SpO2 96%   BMI 31.68 kg/m²     Physical Exam:  Constitutional:  Appears well-developed and well-nourished. No distress.   HEENT:  Normocephalic and atraumatic. PERRL  Neck:  Neck supple. No JVD present.   CV: Normal rate, regular rhythm,  intact distal pulses.  No gallop, murmur or rub.  Pulmonary/Chest: Effort normal and breath sounds normal. No respiratory distress. No wheezes, rhonchi or rales.   Abdominal: Soft. +BS. No distension and no mass. There is no tenderness.   Musculoskeletal: Normal muscle tone and strength  Neurological: Alert and oriented to person, place, and time.  No focal deficits  Skin: Skin is warm and dry. No rash noted.   Extremities:  No clubbing, edema or cyanosis  Psychiatric: Normal mood and affect. Behavior is normal.     Interval:  (shift change)  1a. Level of Consciousness: 0-->Alert, keenly responsive  1b. LOC Questions: 0-->Answers both questions correctly  1c. LOC Commands: 0-->Performs both tasks correctly  2. Best Gaze: 0-->Normal  3. Visual: 0-->No visual loss  4. Facial Palsy: 1-->Minor paralysis (flattened nasolabial fold, asymmetry on smiling)  5a. Motor Arm, Left: 0-->No drift, limb holds 90 (or 45) degrees for full 10 secs  5b. Motor Arm, Right: 0-->No drift, limb holds 90 (or 45) degrees for full 10 secs  6a. Motor Leg, Left: 0-->No drift, leg holds 30 degree position for full 5 secs  6b. Motor Leg, Right: 0-->No drift, leg holds 30 degree position for full 5 secs  7. " "Limb Ataxia: 0-->Absent  8. Sensory: 0-->Normal, no sensory loss  9. Best Language: 0-->No aphasia, normal  10. Dysarthria: 0-->Normal  11. Extinction and Inattention (formerly Neglect): 0-->No abnormality    Total (NIH Stroke Scale): 1    Labs:  Results from last 7 days   Lab Units 07/26/24  0624   WBC 10*3/mm3 8.29   HEMOGLOBIN g/dL 15.2   HEMATOCRIT % 43.9   PLATELETS 10*3/mm3 239     Results from last 7 days   Lab Units 07/26/24  0624 07/24/24  1305   SODIUM mmol/L 141 139   POTASSIUM mmol/L 3.9 4.1   CHLORIDE mmol/L 105 106   CO2 mmol/L 26.0 25.0   BUN mg/dL 15 16   CREATININE mg/dL 0.92 1.00  1.03   CALCIUM mg/dL 9.0 8.8   BILIRUBIN mg/dL  --  0.4   ALK PHOS U/L  --  77   ALT (SGPT) U/L  --  51*   AST (SGOT) U/L  --  38   GLUCOSE mg/dL 91 133*         No results found for: \"MG\", \"PHOS\"                 Discharge Medications        New Medications        Instructions Start Date   aspirin 81 MG chewable tablet   81 mg, Oral, Daily   Start Date: July 27, 2024     aspirin 325 MG EC tablet   325 mg, Oral, Every 6 Hours PRN   Start Date: August 17, 2024     atorvastatin 80 MG tablet  Commonly known as: LIPITOR   80 mg, Oral, Nightly      clopidogrel 75 MG tablet  Commonly known as: PLAVIX   75 mg, Oral, Daily   Start Date: July 27, 2024            Continue These Medications        Instructions Start Date   buPROPion 75 MG tablet  Commonly known as: WELLBUTRIN   1 tablet, Oral, Daily, NEEDS REFILL      busPIRone 15 MG tablet  Commonly known as: BUSPAR   15 mg, Oral, 3 Times Daily      cyclobenzaprine 10 MG tablet  Commonly known as: FLEXERIL   10 mg, Oral, Nightly PRN      DULoxetine 60 MG capsule  Commonly known as: CYMBALTA   TAKE 1 CAPSULE BY MOUTH EVERY DAY      fexofenadine 180 MG tablet  Commonly known as: Allegra Allergy   180 mg, Oral, Daily      propranolol 20 MG tablet  Commonly known as: INDERAL   20 mg, Oral, 2 Times Daily             Stop These Medications      amphetamine-dextroamphetamine 20 MG " tablet  Commonly known as: ADDERALL     rosuvastatin 20 MG tablet  Commonly known as: Crestor              Diet Instructions       Diet: Regular/House Diet, Cardiac Diets; Healthy Heart (2-3 Na+); Regular (IDDSI 7); Thin (IDDSI 0)      Discharge Diet:  Regular/House Diet  Cardiac Diets       Cardiac Diet: Healthy Heart (2-3 Na+)    Texture: Regular (IDDSI 7)    Fluid Consistency: Thin (IDDSI 0)            Activity Instructions       Activity as Tolerated              Follow-up Appointments  No future appointments.  Additional Instructions for the Follow-ups that You Need to Schedule       Ambulatory Referral to Physical Therapy   As directed      Specialty needed: Evaluate and treat Neuro   Weight Bearing Status: Full weight bearing   Follow-up needed: Yes        Discharge Follow-up with PCP   As directed       Currently Documented PCP:    Carline Schmitt APRN    PCP Phone Number:    [phone]     Follow Up Details: IN 2-4 weeks per patient convenience                Discharge Instructions:  Discharge home today   Follow-ups as above  PCP 2-4 weeks  Stroke neurology in 6 weeks   Medications as above  DAPT X 21 days then  mg monotherapy starting 8/17/24   Out-patient PT ordered per case management  Patient provided with OP facilities near their home and will self-schedule the facility of his choosing  Rolling walker ordered via [employer] and delivered to bedside prior to discharge   If symptoms worsen or recur, seek medical attention or call 911     Electronically signed by JAYLEN Ruiz, 07/26/24, 12:58 PM EDT.    Time: I spent  33  minutes on this discharge activity which included: face-to-face encounter with the patient, reviewing the data in the system, coordination of the care with the nursing staff as well as consultants, documentation, and entering orders.      CC: Carline Schmitt APRN        Electronically signed by Tim Lugo MD at 07/26/24 8611

## 2024-07-29 NOTE — OUTREACH NOTE
Call Center TCM Note      Flowsheet Row Responses   [facility] patient discharged from? [place]   Does the patient have one of the following disease processes/diagnoses(primary or secondary)? Stroke   TCM attempt successful? No   Unsuccessful attempts Attempt 1  [Attempted to reach patient and wife, Paulina, listed on PCP verbal release. No answer]            Kimmy Yanes RN    7/29/2024, 14:29 EDT

## 2024-07-30 ENCOUNTER — TRANSITIONAL CARE MANAGEMENT TELEPHONE ENCOUNTER (OUTPATIENT)
Dept: CALL CENTER | Facility: HOSPITAL | Age: 64
End: 2024-07-30
Payer: COMMERCIAL

## 2024-07-30 NOTE — OUTREACH NOTE
Call Center TCM Note      Flowsheet Row Responses   [facility] patient discharged from? [facility]   Does the patient have one of the following disease processes/diagnoses(primary or secondary)? Stroke   TCM attempt successful? No   Unsuccessful attempts Attempt 3            Larry Pantoja RN    7/30/2024, 12:50 EDT

## 2024-08-02 ENCOUNTER — OFFICE VISIT (OUTPATIENT)
Dept: INTERNAL MEDICINE | Facility: CLINIC | Age: 64
End: 2024-08-02
Payer: COMMERCIAL

## 2024-08-02 VITALS
WEIGHT: 227.2 LBS | HEIGHT: 71 IN | OXYGEN SATURATION: 95 % | SYSTOLIC BLOOD PRESSURE: 124 MMHG | DIASTOLIC BLOOD PRESSURE: 72 MMHG | RESPIRATION RATE: 18 BRPM | HEART RATE: 72 BPM | TEMPERATURE: 97.8 F | BODY MASS INDEX: 31.81 KG/M2

## 2024-08-02 DIAGNOSIS — F41.9 ANXIETY: ICD-10-CM

## 2024-08-02 DIAGNOSIS — R07.9 CHEST PAIN, UNSPECIFIED TYPE: ICD-10-CM

## 2024-08-02 DIAGNOSIS — Z09 HOSPITAL DISCHARGE FOLLOW-UP: Primary | ICD-10-CM

## 2024-08-02 DIAGNOSIS — Z86.73 HISTORY OF CVA (CEREBROVASCULAR ACCIDENT): ICD-10-CM

## 2024-08-02 RX ORDER — BUPROPION HYDROCHLORIDE 75 MG/1
75 TABLET ORAL DAILY
Qty: 90 TABLET | Refills: 0 | Status: SHIPPED | OUTPATIENT
Start: 2024-08-02

## 2024-08-02 NOTE — PROGRESS NOTES
Transitional Care Follow Up Visit  Subjective     Kennedy Jerry Griffith is a 63 y.o. male who presents for a transitional care management visit.    Within 48 business hours after discharge our office contacted him via telephone to coordinate his care and needs.      I reviewed and discussed the details of that call along with the discharge summary, hospital problems, inpatient lab results, inpatient diagnostic studies, and consultation reports with Kennedy.     Current outpatient and discharge medications have been reconciled for the patient.  Reviewed by: Wayne Neville MD          7/26/2024     4:13 PM   Date of TCM Phone Call   [facility]   Date of Admission 7/24/2024   Date of Discharge 7/26/2024   Discharge Disposition Home or Self Care     Risk for Readmission (LACE) No data recorded      History of Present Illness   Course During Hospital Stay:      Kennedy Griffith is a 63-year-old male with past medical history of hypertension, chronic tension headaches, dyslipidemia, mood disorder who woke up the morning of 7/26/24 with dizziness, nausea and unsteady gait who presented to the ED for ongoing symptoms concerning for a stroke.     He had stroke workup with MRI imaging showing a small stroke in the right vinay.  Patient did undergo a TNK and then was admitted to the ICU for observation.  While in the hospital he was started on dual antiplatelet therapy along with a statin therapy  He had TTE with bubble which was unremarkable.  He underwent physical therapy and OT.  He will continue with the dual antiplatelet therapy for 21 days and then he will start his high-dose aspirin therapy starting on August 17.    Since discharge patient has been doing well he does still have some weakness and unsteady gait.  He also been dealing with chronic chest pains.  He had stress test done back in February which was unremarkable however the patient would like to see a cardiologist still, as he continues to have  some chest discomfort with exertion.  He has follow-up with neurology, he will continue his physical therapy at home.     The following portions of the patient's history were reviewed and updated as appropriate: allergies, current medications, past family history, past medical history, past social history, past surgical history, and problem list.    Review of Systems   All other systems reviewed and are negative.      Objective   Physical Exam  Constitutional:       Appearance: Normal appearance.   Cardiovascular:      Rate and Rhythm: Normal rate and regular rhythm.      Pulses: Normal pulses.   Pulmonary:      Effort: Pulmonary effort is normal.      Breath sounds: Normal breath sounds.   Neurological:      Mental Status: He is alert.          :     Common labs          7/24/2024    13:05 7/25/2024    06:27 7/26/2024    06:24   Common Labs   Glucose 133   91    BUN 16   15    Creatinine 1.03     1.00   0.92    Sodium 139   141    Potassium 4.1   3.9    Chloride 106   105    Calcium 8.8   9.0    Albumin 4.4      Total Bilirubin 0.4      Alkaline Phosphatase 77      AST (SGOT) 38      ALT (SGPT) 51      WBC 7.98   8.29    Hemoglobin 15.6     15.3   15.2    Hematocrit 46.5     45   43.9    Platelets 247   239    Total Cholesterol  231     Triglycerides  120     HDL Cholesterol  44     LDL Cholesterol   165     Hemoglobin A1C  5.70       Data reviewed : Recent hospitalization notes reviewed [facility] notes from July 24 to July 26, 2024            Assessment & Plan   Diagnoses and all orders for this visit:    1. Hospital discharge follow-up (Primary)    2. History of CVA (cerebrovascular accident)  Continue with dual antiplatelet therapy until August 17 the transition to high-dose aspirin therapy and statin.,   Follow-up with physical therapy follow-up with neurology      3. Anxiety    Medication refilled stable  -     buPROPion (WELLBUTRIN) 75 MG tablet; Take 1 tablet by mouth Daily.  Dispense: 90 tablet;  Refill: 0    4. Chest pain, unspecified type  -     Ambulatory Referral to Cardiology                 MD JERRY Velasquez [facility] [address]  [facility]  [address]  [address] [address] [phone]

## 2024-08-11 LAB
QT INTERVAL: 436 MS
QTC INTERVAL: 457 MS

## 2024-08-15 ENCOUNTER — TELEPHONE (OUTPATIENT)
Dept: INTERNAL MEDICINE | Facility: CLINIC | Age: 64
End: 2024-08-15
Payer: COMMERCIAL

## 2024-08-15 DIAGNOSIS — R53.1 WEAKNESS: ICD-10-CM

## 2024-08-15 DIAGNOSIS — R42 DIZZINESS: ICD-10-CM

## 2024-08-15 DIAGNOSIS — Z86.73 HISTORY OF STROKE: Primary | ICD-10-CM

## 2024-08-15 NOTE — TELEPHONE ENCOUNTER
Pt in office with wife.  Had recent CVA and stay a BHLEX.  Still having dizziness weakness.  Will refer to PT.  Has appt with Neuro next month.

## 2024-08-26 ENCOUNTER — TREATMENT (OUTPATIENT)
Dept: PHYSICAL THERAPY | Facility: CLINIC | Age: 64
End: 2024-08-26
Payer: COMMERCIAL

## 2024-08-26 DIAGNOSIS — R53.1 WEAKNESS: ICD-10-CM

## 2024-08-26 DIAGNOSIS — I63.9 CEREBROVASCULAR ACCIDENT (CVA), UNSPECIFIED MECHANISM: Primary | ICD-10-CM

## 2024-08-26 NOTE — PROGRESS NOTES
"Physical Therapy Initial Evaluation and Plan of Care  [place]    Patient: Kennedy Griffith   : 1960  Diagnosis/ICD-10 Code:  Cerebrovascular accident (CVA), unspecified mechanism [I63.9]  Referring practitioner: JAYLEN Panda  Date of Initial Visit: 2024  Today's Date: 2024  Patient seen for 1 session         Visit Diagnoses:    ICD-10-CM ICD-9-CM   1. Cerebrovascular accident (CVA), unspecified mechanism  I63.9 434.91   2. Weakness  R53.1 780.79         Subjective Questionnaire: I:       Subjective Evaluation    History of Present Illness  Mechanism of injury: Had dizziness and went to ER; was told he was having a CVA, had \"clot buster\" and had to watch for bleeds     Spent 3 days in hospital;     Feels like he has plateaud with his dizziness and balance    Spins sometimes when he lays down, had not happened for awhile ; this week it has happened a couple of times    During the stroke he had a lot of imbalance and dizziness     Works at [employer] as an   Usually sits in the office  Has to be fit for duty, has not talked to medical review officer yet, so not sure what he is going to be allowed to do     Has mowed the lawn- didn't fatigue  Feels pretty good walking around now    Used a walker for two days, not using a cane     Needs help with balance   Drove himself to therapy today    \"No issues with dizziness while seated\"  No double vision anymore     No pain; legs don't feel that weak    Hx of neck stiffness, tension headaches        Patient Occupation: [employer]  Quality of life: good    Pain  Current pain ratin    Patient Goals  Patient goals for therapy: improved balance, increased strength, independence with ADLs/IADLs, return to work and return to sport/leisure activities           Objective          Neurological Testing     Reflexes   Left   Patellar (L4): normal (2+)  Achilles (S1): trace (1+)    Right   Patellar (L4): " "normal (2+)  Achilles (S1): trace (1+)    Additional Neurological Details  VOR and smooth pursuit WNL      Active Range of Motion   Left Hip   Normal active range of motion    Right Hip   Normal active range of motion    Strength/Myotome Testing     Left Hip   Planes of Motion   Flexion: 4+  Extension: 4  Abduction: 4  External rotation: 4+  Internal rotation: 5    Right Hip   Planes of Motion   Flexion: 4+  Extension: 4  Abduction: 4  External rotation: 4+  Internal rotation: 5    Left Knee   Flexion: 5  Extension: 5  Quadriceps contraction: good    Right Knee   Flexion: 5  Extension: 5  Quadriceps contraction: good    Ambulation   Weight-Bearing Status   Assistive device used: none    Ambulation: Level Surfaces     Additional Level Surfaces Ambulation Details  Mild wobble at times especially with turning while walking    One leg balance= able to balance 1 leg ~ 15\" each     5 x STS test = 16.4\"     3/4 MCTSIB- difficulty on air ex with eyes closed       Ambulation: Stairs     Additional Stairs Ambulation Details  Has stairs in his home, able to do, holds onto railing with one hand for support     Observational Gait   Decreased walking speed and stride length.           Assessment & Plan       Assessment  Impairments: abnormal gait, activity intolerance, impaired balance, impaired physical strength and lacks appropriate home exercise program   Functional limitations: carrying objects, lifting, walking, pulling, pushing, standing and stooping   Assessment details: Very pleasant 62 yo presents to PT s/p CVA ~ 1 month ago; his chief complaint is unsteadiness with gait and occasional dizziness; he has good (-) strength in his LE's and has minimal difficulty with static balance; will address strength, balance, endurance and dizziness and progress to more dynamic balance tasks and work simulated tasks as he progresses; he is an excellent candidate for PT to restore full function and RTW  Barriers to therapy: post CVA " "effects, dizziness    Goals  Plan Goals: 2 weeks:  1. Patient to display IND with HEP  2. Patient to improve MCTSIB to 4/4  3. Patient to have no c/o dizziness with changing positions    4 weeks:  4 Patient to improve 5x STS test to <10\"  5. Patient to single leg balance > 30\" for decreased fall risk  6. Patient to improve DHI score by 1 MCID  7. Patient to display ability to safely perform work simulated balance/gait tasks without LOB or unsteadiness    Plan  Therapy options: will be seen for skilled therapy services  Other planned modality interventions: prn  Planned therapy interventions: manual therapy, neuromuscular re-education, postural training, soft tissue mobilization, spinal/joint mobilization, strengthening, stretching, therapeutic activities, joint mobilization, home exercise program, gait training, functional ROM exercises, balance/weight-bearing training and body mechanics training  Frequency: 2x week  Duration in weeks: 8  Treatment plan discussed with: patient        Timed:         Manual Therapy:         mins  42161;     Therapeutic Exercise:    15     mins  93628;     Neuromuscular Paul:    10    mins  07705;    Therapeutic Activity:          mins  61042;     Gait Training:           mins  04636;     Ultrasound:          mins  05106;    Ionto                                   mins   86375  Self Care                            mins   42162  Canalith Repos         mins 09255      Un-Timed:  Electrical Stimulation:         mins  87442 ( );  Dry Needling          mins self-pay  Traction          mins 48807    Low Eval     30     Mins  22065  Mod Eval          Mins  45260  High Eval                            Mins  85711        Timed Treatment:   25   mins   Total Treatment:     55   mins          PT: MELANIA Lennon PT     License Number: [id number]  Electronically signed by MELANIA Lennon PT, 08/26/24, 10:34 AM EDT    Certification Period: 8/28/2024 thru 11/25/2024  I certify that the therapy " services are furnished while this patient is under my care.  The services outlined above are required by this patient, and will be reviewed every 90 days.         Physician Signature:__________________________________________________    PHYSICIAN: Carline Schmitt APRN  NPI: [id number]                                      DATE:      Please sign and return via fax to [email or web address] . Thank you, [facility].

## 2024-08-29 ENCOUNTER — TREATMENT (OUTPATIENT)
Dept: PHYSICAL THERAPY | Facility: CLINIC | Age: 64
End: 2024-08-29
Payer: COMMERCIAL

## 2024-08-29 DIAGNOSIS — R53.1 WEAKNESS: ICD-10-CM

## 2024-08-29 DIAGNOSIS — I63.9 CEREBROVASCULAR ACCIDENT (CVA), UNSPECIFIED MECHANISM: Primary | ICD-10-CM

## 2024-08-29 NOTE — PROGRESS NOTES
Physical Therapy Daily Progress Note    Subjective   Kennedy Griffith reports: he still gets dizzy when leaning his head down. He feels like his strength is pretty good, but he is concerned about the dizziness.      Objective   See Exercise, Manual, and Modality Logs for complete treatment.       Assessment/Plan  Pt tolerated treatment well. He had increased difficulty with balancing with his eyes closed. He was educated on an updated HEP. He is doing very well at this point. Discussed that the dizziness should dissipate over time but an exact time frame cannot be determined. Encouraged him to reach out to his neurologist to discuss this further.     Progress per Plan of Care           Manual Therapy:         mins  47569;  Therapeutic Exercise:    18     mins  34607;     Neuromuscular Paul:    26    mins  86206;    Therapeutic Activity:     10     mins  95675;     Gait Training:           mins  96125;     Ultrasound:          mins  91038;    Electrical Stimulation:         mins  82342 ( );  E-Stim Attended:         mins  69259  Iontophoresis          mins 68669   Traction          mins  09886  Fluidotherapy          mins  38768  Dry Needling          mins self-pay - No Charge  Paraffin          mins  29832    Timed Treatment:   54   mins   Total Treatment:     54   mins    Yessenia Shoemaker, PT, DPT  Physical Therapist

## 2024-09-04 ENCOUNTER — OFFICE VISIT (OUTPATIENT)
Dept: CARDIOLOGY | Facility: CLINIC | Age: 64
End: 2024-09-04
Payer: COMMERCIAL

## 2024-09-04 VITALS
BODY MASS INDEX: 31.56 KG/M2 | HEART RATE: 85 BPM | HEIGHT: 71 IN | WEIGHT: 225.4 LBS | OXYGEN SATURATION: 96 % | DIASTOLIC BLOOD PRESSURE: 78 MMHG | SYSTOLIC BLOOD PRESSURE: 116 MMHG

## 2024-09-04 DIAGNOSIS — E78.2 MIXED HYPERLIPIDEMIA: Primary | ICD-10-CM

## 2024-09-04 DIAGNOSIS — I20.89 ANGINA OF EFFORT: ICD-10-CM

## 2024-09-04 RX ORDER — AMLODIPINE BESYLATE 2.5 MG/1
2.5 TABLET ORAL NIGHTLY
Qty: 90 TABLET | Refills: 3 | Status: SHIPPED | OUTPATIENT
Start: 2024-09-04

## 2024-09-04 NOTE — PROGRESS NOTES
[facility] Cardiology  Consultation H&P  Kennedy Griffith  1960  [phone]  There is no work phone number on file..    VISIT DATE:  09/04/2024    PCP: Carline Schmitt, APRN  [address]  [facility] [address]    CC:  Chief Complaint   Patient presents with    Establish Care     Chest pain       Previous cardiac studies and procedures:  February 2024 exercise myocardial perfusion imaging    Exercise cardiolite with normal perfusion.    Left ventricular ejection fraction is hyperdynamic (Calculated EF > 70%).    Patient complained of chest tightness, rated 4/10, during exercise that resolved in recovery. Pt also c/o SOA (SP02 WNL on RA) that also resolved in recovery.    THR of 131 achieved at 8:20.    Expected exercise duration = 8:00. Actual exercise duration = 9:30. BERRY (-18).    SB-ST with PVCs and bigeminy noted during exercise.    T wave abnormalites in Anterior leads. T wave inversion at baseline in lead III that returned near baseline in recovery.    No significant ST-T wave changes noted.    No coronary calcifications.    July 2024 TTE    Left ventricular systolic function is normal. Estimated left ventricular EF = 60%    Left ventricular wall thickness is consistent with mild concentric hypertrophy.    Saline test results are negative.    The cardiac valves are anatomically and functionally normal.       ASSESSMENT:   Diagnosis Plan   1. Mixed hyperlipidemia  Lipid Panel    Comprehensive Metabolic Panel      2. Angina of effort              PLAN:  Stable angina, class II: No significant at risk myocardium on recent perfusion imaging.  Continue aspirin and high intensity statin therapy.  Trial of low-dose amlodipine for symptom relief.  Not recommending further cardiac testing at this time.    Hyperlipidemia: Goal LDL less than 70.  Lipid profile pending.  Continue high intensity statin therapy.  May need to add PCSK9 inhibitor depending on lipid  "profile.    History of Present Illness   63-year-old prediabetic gentleman with a history of hyperlipidemia and recent CVA treated with TNK presenting with longstanding history of exertional chest discomfort.  Has had exertional lower precordial chest discomfort and a class II pattern with such activities as walking fast or going up a flight of stairs over the past 2 to 3 years.  Relieved with rest.  Has undergone a low risk cardiac evaluation.  No other triggers to his symptoms.  Currently has persistent positional vertigo following his CNS event in July.  Undergoing physical therapy.  Compliant with medical therapy.  Blood pressures running less than 125/80 mmHg.    PHYSICAL EXAMINATION:  Vitals:    09/04/24 1249   BP: 116/78   BP Location: Right arm   Patient Position: Sitting   Cuff Size: Adult   Pulse: 85   SpO2: 96%   Weight: 102 kg (225 lb 6.4 oz)   Height: 180.3 cm (71\")     General Appearance:    Alert, cooperative, no distress, appears stated age   Head:    Normocephalic, without obvious abnormality, atraumatic   Eyes:    conjunctiva/corneas clear, EOM's intact, fundi     benign, both eyes   Ears:    Normal TM's and external ear canals, both ears   Nose:   Nares normal, septum midline, mucosa normal, no drainage    or sinus tenderness   Throat:   Lips, mucosa, and tongue normal; teeth and gums normal   Neck:   Supple, symmetrical, trachea midline, no adenopathy;     thyroid:  no enlargement/tenderness/nodules; no carotid    bruit or JVD   Back:     Symmetric, no curvature, ROM normal, no CVA tenderness   Lungs:     Clear to auscultation bilaterally, respirations unlabored   Chest Wall:    No tenderness or deformity    Heart:    Regular rate and rhythm, S1 and S2 normal, no murmur, rub   or gallop, normal carotid impulse bilaterally without bruit.   Abdomen:     Soft, non-tender, bowel sounds active all four quadrants,     no masses, no organomegaly   Extremities:   Extremities normal, atraumatic, no " cyanosis or edema   Pulses:   2+ and symmetric all extremities   Skin:   Skin color, texture, turgor normal, no rashes or lesions   Lymph nodes:   Cervical, supraclavicular, and axillary nodes normal   Neurologic:   normal strength, sensation intact     throughout       Diagnostic Data:  Procedures  Lab Results   Component Value Date    TRIG 120 07/25/2024    HDL 44 07/25/2024     Lab Results   Component Value Date    GLUCOSE 91 07/26/2024    BUN 15 07/26/2024    CREATININE 0.92 07/26/2024     07/26/2024    K 3.9 07/26/2024     07/26/2024    CO2 26.0 07/26/2024     Lab Results   Component Value Date    HGBA1C 5.70 (H) 07/25/2024     Lab Results   Component Value Date    WBC 8.29 07/26/2024    HGB 15.2 07/26/2024    HCT 43.9 07/26/2024     07/26/2024       PROBLEM LIST:  Patient Active Problem List   Diagnosis    Anxiety    PTSD (post-traumatic stress disorder)    Mixed hyperlipidemia    Vitamin D deficiency    Nephrolithiasis    Gross hematuria    Stroke    Angina of effort       PAST MEDICAL HX  Past Medical History:   Diagnosis Date    Depression     Fatigue     chronic    History of kidney stones     Hyperlipidemia     Sleepiness     Stroke     Tension headache        Allergies  No Known Allergies    Current Medications    Current Outpatient Medications:     aspirin 325 MG EC tablet, Take 1 tablet by mouth Every 6 (Six) Hours As Needed for Mild Pain., Disp: 30 tablet, Rfl: 1    atorvastatin (LIPITOR) 80 MG tablet, Take 1 tablet by mouth Every Night., Disp: 90 tablet, Rfl: 2    buPROPion (WELLBUTRIN) 75 MG tablet, Take 1 tablet by mouth Daily., Disp: 90 tablet, Rfl: 0    busPIRone (BUSPAR) 15 MG tablet, Take 1 tablet by mouth 3 (Three) Times a Day., Disp: 270 tablet, Rfl: 1    cyclobenzaprine (FLEXERIL) 10 MG tablet, Take 1 tablet by mouth At Night As Needed for Muscle Spasms (muscle spasm, tension)., Disp: 30 tablet, Rfl: 2    DULoxetine (CYMBALTA) 60 MG capsule, TAKE 1 CAPSULE BY MOUTH EVERY  DAY, Disp: 90 capsule, Rfl: 0    fexofenadine (Allegra Allergy) 180 MG tablet, Take 1 tablet by mouth Daily., Disp: 90 tablet, Rfl: 3    meclizine (ANTIVERT) 25 MG tablet, Take 1 tablet by mouth 3 (Three) Times a Day As Needed for Dizziness for up to 6 doses. Indications: Dizzy, Disp: 6 tablet, Rfl: 0    propranolol (INDERAL) 20 MG tablet, Take 1 tablet by mouth 2 (Two) Times a Day., Disp: , Rfl:     amLODIPine (NORVASC) 2.5 MG tablet, Take 1 tablet by mouth Every Night., Disp: 90 tablet, Rfl: 3         ROS  ROS      SOCIAL HX  Social History     Socioeconomic History    Marital status:    Tobacco Use    Smoking status: Never     Passive exposure: Never    Smokeless tobacco: Never   Vaping Use    Vaping status: Never Used   Substance and Sexual Activity    Alcohol use: Yes     Comment: on rare occasion    Drug use: Never    Sexual activity: Defer     Comment:        FAMILY HX  Family History   Problem Relation Age of Onset    Other Mother         cardiac arrhythmia/pacer    Arthritis Father     No Known Problems Sister     No Known Problems Brother     Colon cancer Maternal Grandmother     No Known Problems Maternal Grandfather     Heart disease Paternal Grandmother     Coronary artery disease Paternal Grandfather     Lung cancer Paternal Grandfather     Other Daughter         PTSD/suicide    No Known Problems Son     ADD / ADHD Son              Matthew Trevino III, MD, FACC

## 2024-09-06 ENCOUNTER — TREATMENT (OUTPATIENT)
Dept: PHYSICAL THERAPY | Facility: CLINIC | Age: 64
End: 2024-09-06
Payer: COMMERCIAL

## 2024-09-06 DIAGNOSIS — I63.9 CEREBROVASCULAR ACCIDENT (CVA), UNSPECIFIED MECHANISM: Primary | ICD-10-CM

## 2024-09-06 NOTE — PROGRESS NOTES
Physical Therapy Daily Treatment Note  [place]     Patient: Kennedy Griffith   : 1960  Referring practitioner: JAYLEN Panda  Date of Initial Visit: Type: THERAPY  Noted: 2024  Today's Date: 2024  Patient seen for 3 sessions       Visit Diagnoses:    ICD-10-CM ICD-9-CM   1. Cerebrovascular accident (CVA), unspecified mechanism  I63.9 434.91       Visit #: 3    Subjective   Dizziness is a little better; patient states he got dizzy when bending over to change cat litter and lost his balance into the wall-did not fall to the ground    Objective   See Exercise, Manual, and Modality Logs for complete treatment    Added: standing tband hip abd and ext; standing MIP; lunges to a step; air ex balance with head turns; 1 leg balance; static balance with EO/EC and EO with head turns; 1 leg RDL with hand assist on Free Motion bar    Assessment/Plan  Performed well with new balance and vestibular exercises; will advance bending over/leaning exercises to improve functional activities such as cleaning litter box    Treatment considerations for next visit:  Advance as tolerated     Timed:   Manual Therapy:         mins  36601;     Therapeutic Exercise:         mins  41695;     Neuromuscular Paul:    15    mins  98667;    Therapeutic Activity:     15     mins  95686;     Gait Training:           mins  49548;     Ultrasound:          mins  53494;    Ionto                                   mins   30830  Self Care                            mins   29953  Canalith Repos         mins   08287    Un-Timed:  Electrical Stimulation:         mins  62525 ( );  Dry Needling          mins self-pay  Traction          mins 28462      Timed Treatment:   30   mins   Total Treatment:     30   mins    MELANIA Lennon, PT  [place] License: [id number]

## 2024-09-16 ENCOUNTER — TREATMENT (OUTPATIENT)
Dept: PHYSICAL THERAPY | Facility: CLINIC | Age: 64
End: 2024-09-16
Payer: COMMERCIAL

## 2024-09-16 DIAGNOSIS — R53.1 WEAKNESS: ICD-10-CM

## 2024-09-16 DIAGNOSIS — I63.9 CEREBROVASCULAR ACCIDENT (CVA), UNSPECIFIED MECHANISM: Primary | ICD-10-CM

## 2024-09-20 ENCOUNTER — TREATMENT (OUTPATIENT)
Dept: PHYSICAL THERAPY | Facility: CLINIC | Age: 64
End: 2024-09-20
Payer: COMMERCIAL

## 2024-09-20 DIAGNOSIS — R53.1 WEAKNESS: ICD-10-CM

## 2024-09-20 DIAGNOSIS — I63.9 CEREBROVASCULAR ACCIDENT (CVA), UNSPECIFIED MECHANISM: Primary | ICD-10-CM

## 2024-09-25 ENCOUNTER — OFFICE VISIT (OUTPATIENT)
Dept: NEUROLOGY | Facility: CLINIC | Age: 64
End: 2024-09-25
Payer: COMMERCIAL

## 2024-09-25 VITALS
TEMPERATURE: 98.6 F | HEIGHT: 71 IN | OXYGEN SATURATION: 96 % | DIASTOLIC BLOOD PRESSURE: 72 MMHG | BODY MASS INDEX: 31.71 KG/M2 | HEART RATE: 74 BPM | WEIGHT: 226.5 LBS | SYSTOLIC BLOOD PRESSURE: 106 MMHG

## 2024-09-25 DIAGNOSIS — E78.2 MIXED HYPERLIPIDEMIA: ICD-10-CM

## 2024-09-25 DIAGNOSIS — G43.E19 INTRACTABLE CHRONIC MIGRAINE WITH AURA AND WITHOUT STATUS MIGRAINOSUS: ICD-10-CM

## 2024-09-25 DIAGNOSIS — G47.33 OSA (OBSTRUCTIVE SLEEP APNEA): ICD-10-CM

## 2024-09-25 DIAGNOSIS — I63.331 CEREBROVASCULAR ACCIDENT (CVA) DUE TO THROMBOSIS OF RIGHT POSTERIOR CEREBRAL ARTERY: Primary | ICD-10-CM

## 2024-09-25 DIAGNOSIS — F41.9 ANXIETY: ICD-10-CM

## 2024-10-07 ENCOUNTER — TELEPHONE (OUTPATIENT)
Dept: PHYSICAL THERAPY | Facility: CLINIC | Age: 64
End: 2024-10-07

## 2024-10-07 NOTE — TELEPHONE ENCOUNTER
Caller: Kennedy Griffith    Relationship: Self       What was the call regarding:  RELEASED HIM

## 2024-10-10 ENCOUNTER — CALL CENTER PROGRAMS (OUTPATIENT)
Dept: CALL CENTER | Facility: HOSPITAL | Age: 64
End: 2024-10-10
Payer: COMMERCIAL

## 2024-10-10 NOTE — OUTREACH NOTE
Stroke Barb Survey      Flowsheet Row Responses   Facility patient discharged from? [facility]   Attempt successful No   Unsuccessful attempts Attempt 1            HANNAH CID - Registered Nurse

## 2024-10-15 ENCOUNTER — CALL CENTER PROGRAMS (OUTPATIENT)
Dept: CALL CENTER | Facility: HOSPITAL | Age: 64
End: 2024-10-15
Payer: COMMERCIAL

## 2024-10-15 NOTE — OUTREACH NOTE
Stroke Barb Survey      Flowsheet Row Responses   Facility patient discharged from? [place]   Attempt successful No   Unsuccessful attempts Attempt 2            HANNAH CID - Registered Nurse

## 2024-10-24 ENCOUNTER — CALL CENTER PROGRAMS (OUTPATIENT)
Dept: CALL CENTER | Facility: HOSPITAL | Age: 64
End: 2024-10-24
Payer: COMMERCIAL

## 2024-10-24 NOTE — OUTREACH NOTE
Stroke Barb Survey      Flowsheet Row Responses   Facility patient discharged from? [place] [facility]   Attempt successful No   Unsuccessful attempts Attempt 3   Call Center [place] Score 7            HANNAH CID - Registered Nurse

## 2024-10-29 DIAGNOSIS — F41.9 ANXIETY: ICD-10-CM

## 2024-10-29 RX ORDER — BUPROPION HYDROCHLORIDE 75 MG/1
75 TABLET ORAL DAILY
Qty: 90 TABLET | Refills: 0 | Status: SHIPPED | OUTPATIENT
Start: 2024-10-29

## 2024-10-29 NOTE — TELEPHONE ENCOUNTER
Rx Refill Note  Requested Prescriptions     Pending Prescriptions Disp Refills    buPROPion (WELLBUTRIN) 75 MG tablet [Pharmacy Med Name: BUPROPION HCL 75 MG TABLET] 90 tablet 0     Sig: TAKE 1 TABLET BY MOUTH EVERY DAY      Last office visit with prescribing clinician: 8/2/2024   Last telemedicine visit with prescribing clinician: Visit date not found   Next office visit with prescribing clinician: Visit date not found     Ashli Guzman MA  10/29/24, 07:54 EDT

## 2024-12-09 RX ORDER — PROPRANOLOL HCL 20 MG
20 TABLET ORAL 2 TIMES DAILY
Qty: 60 TABLET | Refills: 0 | Status: SHIPPED | OUTPATIENT
Start: 2024-12-09

## 2024-12-09 NOTE — TELEPHONE ENCOUNTER
Rx Refill Note  Requested Prescriptions     Pending Prescriptions Disp Refills    propranolol (INDERAL) 20 MG tablet       Sig: Take 1 tablet by mouth 2 (Two) Times a Day.      Last office visit with prescribing clinician: 3/4/2024   Last telemedicine visit with prescribing clinician: Visit date not found   Next office visit with prescribing clinician: 1/3/2025       Ashli Guzman MA  12/09/24, 14:35 EST

## 2024-12-09 NOTE — TELEPHONE ENCOUNTER
Caller: GladisKennedy    Relationship: Self    Best call back number: [phone]     Requested Prescriptions:   Requested Prescriptions     Pending Prescriptions Disp Refills    propranolol (INDERAL) 20 MG tablet       Sig: Take 1 tablet by mouth 2 (Two) Times a Day.        Pharmacy where request should be sent: [facility] #[id number] - [place], [place] - [address] [phone] [facility] [phone] FX     Last office visit with prescribing clinician: 3/4/2024   Last telemedicine visit with prescribing clinician: Visit date not found   Next office visit with prescribing clinician: 1/3/2025     Additional details provided by patient: OUT OF MEDICATION     Does the patient have less than a 3 day supply:  [x] Yes  [] No    Would you like a call back once the refill request has been completed: [] Yes [] No    If the office needs to give you a call back, can they leave a voicemail: [] Yes [] No    Ephraim Blunt Rep   12/09/24 13:34 EST

## 2024-12-18 ENCOUNTER — LAB (OUTPATIENT)
Dept: LAB | Facility: HOSPITAL | Age: 64
End: 2024-12-18
Payer: COMMERCIAL

## 2024-12-18 ENCOUNTER — OFFICE VISIT (OUTPATIENT)
Dept: CARDIOLOGY | Facility: CLINIC | Age: 64
End: 2024-12-18
Payer: COMMERCIAL

## 2024-12-18 VITALS
BODY MASS INDEX: 32.03 KG/M2 | HEIGHT: 71 IN | WEIGHT: 228.8 LBS | DIASTOLIC BLOOD PRESSURE: 70 MMHG | HEART RATE: 79 BPM | SYSTOLIC BLOOD PRESSURE: 124 MMHG | OXYGEN SATURATION: 95 %

## 2024-12-18 DIAGNOSIS — E78.2 MIXED HYPERLIPIDEMIA: Primary | ICD-10-CM

## 2024-12-18 PROCEDURE — 80053 COMPREHEN METABOLIC PANEL: CPT | Performed by: INTERNAL MEDICINE

## 2024-12-18 PROCEDURE — 80061 LIPID PANEL: CPT | Performed by: INTERNAL MEDICINE

## 2024-12-18 PROCEDURE — 99214 OFFICE O/P EST MOD 30 MIN: CPT | Performed by: INTERNAL MEDICINE

## 2024-12-18 NOTE — PROGRESS NOTES
[facility] Cardiology  Office visit  Kennedy Griffith  1960  [phone]  There is no work phone number on file.    VISIT DATE:  12/18/2024    PCP: Carline Schmitt, APRN  [address]  [facility] [address]    CC:  Chief Complaint   Patient presents with    Mixed hyperlipidemia       Previous cardiac studies and procedures:  February 2024 exercise myocardial perfusion imaging    Exercise cardiolite with normal perfusion.    Left ventricular ejection fraction is hyperdynamic (Calculated EF > 70%).    Patient complained of chest tightness, rated 4/10, during exercise that resolved in recovery. Pt also c/o SOA (SP02 WNL on RA) that also resolved in recovery.    THR of 131 achieved at 8:20.    Expected exercise duration = 8:00. Actual exercise duration = 9:30. BERRY (-18).    SB-ST with PVCs and bigeminy noted during exercise.    T wave abnormalites in Anterior leads. T wave inversion at baseline in lead III that returned near baseline in recovery.    No significant ST-T wave changes noted.    No coronary calcifications.    July 2024 TTE    Left ventricular systolic function is normal. Estimated left ventricular EF = 60%    Left ventricular wall thickness is consistent with mild concentric hypertrophy.    Saline test results are negative.    The cardiac valves are anatomically and functionally normal.    ASSESSMENT:   Diagnosis Plan   1. Mixed hyperlipidemia            PLAN:  History of stable angina, class II: Currently asymptomatic.  No significant at risk myocardium on recent perfusion imaging.  Continue aspirin and high intensity statin therapy.  Continue low-dose amlodipine for symptom relief.  Not recommending further cardiac testing at this time.     Hyperlipidemia: Goal LDL less than 70.  History of CVA requiring tenecteplase, no residual deficit noted on MRI.  Lipid profile pending.  Continue high intensity statin therapy.  Recommending initiation of PCSK9  "inhibitor.    Subjective  Interval assessment: No change in baseline functional capacity.  Blood pressures running less than 130/80 mmHg.  He is compliant with medical therapy.    Initial evaluation: 63-year-old prediabetic gentleman with a history of hyperlipidemia and recent CVA treated with TNK presenting with longstanding history of exertional chest discomfort. Has had exertional lower precordial chest discomfort and a class II pattern with such activities as walking fast or going up a flight of stairs over the past 2 to 3 years. Relieved with rest. Has undergone a low risk cardiac evaluation. No other triggers to his symptoms. Currently has persistent positional vertigo following his CNS event in July. Undergoing physical therapy. Compliant with medical therapy. Blood pressures running less than 125/80 mmHg.     PHYSICAL EXAMINATION:  Vitals:    12/18/24 1329   BP: 124/70   BP Location: Left arm   Patient Position: Sitting   Cuff Size: Adult   Pulse: 79   SpO2: 95%   Weight: 104 kg (228 lb 12.8 oz)   Height: 180.3 cm (70.98\")     General Appearance:    Alert, cooperative, no distress, appears stated age   Head:    Normocephalic, without obvious abnormality, atraumatic   Eyes:    conjunctiva/corneas clear   Nose:   Nares normal, septum midline, mucosa normal, no drainage   Throat:   Lips, teeth and gums normal   Neck:   Supple, symmetrical, trachea midline, no carotid    bruit or JVD   Lungs:     Clear to auscultation bilaterally, respirations unlabored   Chest Wall:    No tenderness or deformity    Heart:    Regular rate and rhythm, S1 and S2 normal, no murmur, rub   or gallop, normal carotid impulse bilaterally without bruit.   Abdomen:     Soft, non-tender   Extremities:   Extremities normal, atraumatic, no cyanosis or edema   Pulses:   2+ and symmetric all extremities   Skin:   Skin color, texture, turgor normal, no rashes or lesions       Diagnostic Data:  Procedures  Lab Results   Component Value Date    " TRIG 120 07/25/2024    HDL 44 07/25/2024     Lab Results   Component Value Date    GLUCOSE 91 07/26/2024    BUN 15 07/26/2024    CREATININE 0.92 07/26/2024     07/26/2024    K 3.9 07/26/2024     07/26/2024    CO2 26.0 07/26/2024     Lab Results   Component Value Date    HGBA1C 5.70 (H) 07/25/2024     Lab Results   Component Value Date    WBC 8.29 07/26/2024    HGB 15.2 07/26/2024    HCT 43.9 07/26/2024     07/26/2024       Allergies  No Known Allergies    Current Medications    Current Outpatient Medications:     amLODIPine (NORVASC) 2.5 MG tablet, Take 1 tablet by mouth Every Night., Disp: 90 tablet, Rfl: 3    aspirin 325 MG EC tablet, Take 1 tablet by mouth Every 6 (Six) Hours As Needed for Mild Pain., Disp: 30 tablet, Rfl: 1    atorvastatin (LIPITOR) 80 MG tablet, Take 1 tablet by mouth Every Night., Disp: 90 tablet, Rfl: 2    buPROPion (WELLBUTRIN) 75 MG tablet, TAKE 1 TABLET BY MOUTH EVERY DAY, Disp: 90 tablet, Rfl: 0    busPIRone (BUSPAR) 15 MG tablet, Take 1 tablet by mouth 3 (Three) Times a Day., Disp: 270 tablet, Rfl: 1    cyclobenzaprine (FLEXERIL) 10 MG tablet, Take 1 tablet by mouth At Night As Needed for Muscle Spasms (muscle spasm, tension)., Disp: 30 tablet, Rfl: 2    DULoxetine (CYMBALTA) 60 MG capsule, TAKE 1 CAPSULE BY MOUTH EVERY DAY, Disp: 90 capsule, Rfl: 0    fexofenadine (Allegra Allergy) 180 MG tablet, Take 1 tablet by mouth Daily., Disp: 90 tablet, Rfl: 3    propranolol (INDERAL) 20 MG tablet, Take 1 tablet by mouth 2 (Two) Times a Day., Disp: 60 tablet, Rfl: 0          ROS  ROS      SOCIAL HX  Social History     Socioeconomic History    Marital status:    Tobacco Use    Smoking status: Never     Passive exposure: Never    Smokeless tobacco: Never   Vaping Use    Vaping status: Never Used   Substance and Sexual Activity    Alcohol use: Not Currently     Comment: on rare occasion    Drug use: Never    Sexual activity: Defer     Comment:        FAMILY  HX  Family History   Problem Relation Age of Onset    Other Mother         cardiac arrhythmia/pacer    Arthritis Father     No Known Problems Sister     No Known Problems Brother     Colon cancer Maternal Grandmother     No Known Problems Maternal Grandfather     Heart disease Paternal Grandmother     Coronary artery disease Paternal Grandfather     Lung cancer Paternal Grandfather     Other Daughter         PTSD/suicide    No Known Problems Son     ADD / ADHD Son              Matthew Trevino III, MD, FACC

## 2024-12-19 ENCOUNTER — SPECIALTY PHARMACY (OUTPATIENT)
Dept: GENERAL RADIOLOGY | Facility: HOSPITAL | Age: 64
End: 2024-12-19
Payer: COMMERCIAL

## 2024-12-19 LAB
ALBUMIN SERPL-MCNC: 4.2 G/DL (ref 3.5–5.2)
ALBUMIN/GLOB SERPL: 1.4 G/DL
ALP SERPL-CCNC: 86 U/L (ref 39–117)
ALT SERPL W P-5'-P-CCNC: 30 U/L (ref 1–41)
ANION GAP SERPL CALCULATED.3IONS-SCNC: 8.2 MMOL/L (ref 5–15)
AST SERPL-CCNC: 21 U/L (ref 1–40)
BILIRUB SERPL-MCNC: 0.4 MG/DL (ref 0–1.2)
BUN SERPL-MCNC: 20 MG/DL (ref 8–23)
BUN/CREAT SERPL: 19.8 (ref 7–25)
CALCIUM SPEC-SCNC: 9.4 MG/DL (ref 8.6–10.5)
CHLORIDE SERPL-SCNC: 108 MMOL/L (ref 98–107)
CHOLEST SERPL-MCNC: 166 MG/DL (ref 0–200)
CO2 SERPL-SCNC: 25.8 MMOL/L (ref 22–29)
CREAT SERPL-MCNC: 1.01 MG/DL (ref 0.76–1.27)
EGFRCR SERPLBLD CKD-EPI 2021: 82 ML/MIN/1.73
GLOBULIN UR ELPH-MCNC: 3.1 GM/DL
GLUCOSE SERPL-MCNC: 110 MG/DL (ref 65–99)
HDLC SERPL-MCNC: 46 MG/DL (ref 40–60)
LDLC SERPL CALC-MCNC: 100 MG/DL (ref 0–100)
LDLC/HDLC SERPL: 2.13 {RATIO}
POTASSIUM SERPL-SCNC: 4 MMOL/L (ref 3.5–5.2)
PROT SERPL-MCNC: 7.3 G/DL (ref 6–8.5)
SODIUM SERPL-SCNC: 142 MMOL/L (ref 136–145)
TRIGL SERPL-MCNC: 111 MG/DL (ref 0–150)
VLDLC SERPL-MCNC: 20 MG/DL (ref 5–40)

## 2024-12-26 ENCOUNTER — OFFICE VISIT (OUTPATIENT)
Dept: NEUROLOGY | Facility: CLINIC | Age: 64
End: 2024-12-26
Payer: COMMERCIAL

## 2024-12-26 VITALS
SYSTOLIC BLOOD PRESSURE: 120 MMHG | HEIGHT: 71 IN | OXYGEN SATURATION: 95 % | WEIGHT: 229.4 LBS | DIASTOLIC BLOOD PRESSURE: 70 MMHG | HEART RATE: 82 BPM | TEMPERATURE: 98.4 F | BODY MASS INDEX: 32.11 KG/M2

## 2024-12-26 DIAGNOSIS — Z86.73 HISTORY OF STROKE: Primary | ICD-10-CM

## 2024-12-26 DIAGNOSIS — H53.9 VISION CHANGES: ICD-10-CM

## 2024-12-26 DIAGNOSIS — I10 HYPERTENSION, UNSPECIFIED TYPE: ICD-10-CM

## 2024-12-26 DIAGNOSIS — E78.5 HYPERLIPIDEMIA LDL GOAL <70: ICD-10-CM

## 2024-12-26 NOTE — PATIENT INSTRUCTIONS
- Continue aspirin 325 mg daily  - Continue atorvastatin 80 mg daily  -- Follow up with Dr. Trevino regarding PCSK9 inhibitor, angina  - BP goals less than 130/80.  Check daily and keep a log for primary care provider  - Heart healthy diet  - Increase activity as tolerated  - Return to the ED with any additional stroke symptoms  -- Consider follow up with sleep medicine for sleep apnea  -- Repeat MRI given intermittent vision changes and intermittent gait instability

## 2024-12-26 NOTE — PROGRESS NOTES
"    Follow Up Office Visit      Encounter Date: 2024   Patient Name: Kennedy Griffith  : 1960   MRN: [id number]   PCP: Carline Schmitt APRN    Referring Provider: No ref. provider found     Chief Complaint:  No chief complaint on file.      History of Present Illness: Kennedy Griffith is a 64 y.o. male who is here today to follow up with stroke.    Clinic visit 2024:Kennedy Griffith is a right handed 63 y.o. male with a recent history of a small right vinay infarct as .  Patient has a past medical history of hypertension, chronic tension headaches/migraine, dyslipidemia, ADHD, and mood disorder.  The patient reports, he had acute onset of dizziness and unsteady gait when he presented to the ED at [facility].  The patient underwent a full stroke workup, MRI revealed a small right vinay infarct.  He was discharged on DAPT for 21 days then go to 325 mg aspirin monotherapy.  The patient reports, he is monitoring his blood pressure and taking daily 325 mg of aspirin and continues 80 mg atorvastatin.  The patient denies any episodes of bleeding. The patient reports today, he is at 90% baseline.  He does experience intermittent small episodes of imbalance, which has significantly improved after PT.  He is no longer using a walker.  The patient is walking independently and denies any falls.He is most bothered with what seems to be poststroke psychological effects, patient stated \"I cannot believe that I had a stroke at my age\".  The patient also verbalized multiple stress points including, recent moved from [place], his wife was recently diagnosed with cancer, and he is taking care of his grandchildren after the sudden loss of his daughter.We discussed at great lengths, the importance of psychological health after stroke.  The patient verbalizes the importance of psychological consult and he is willing to speak with his primary care physician.  Furthermore, the patient longstanding " history of daily bilateral headaches which increase in intensity throughout the day.  The patient endorses taking an extra 325 mg tablet of aspirin, for his headache pain.  We discussed the risks and benefits of taking 650 mg of aspirin daily and the patient is agreeable to stop taking aspirin for headaches.  The patient stated, he has been prescribed Topamax as an option and he would like to hold off and try 200 - 400 mg of Ibuprofen, if needed for pain relief.  Patient verbalizes understanding of bleeding risk.  He is willing to consult Neurology for care and treatment of his headaches and ADHD.  Patient denies any other neurological symptoms, including: headache, vision changes, dysesthesias, loss of consciousness, seizure or new areas of motor weakness.     Clinic visit 12/26/2024: Mr. Griffith reports to clinic today alone.  He has been compliant with his aspirin 325 mg and atorvastatin 80 mg daily.  His most recent LDL was 100.  He follows with Dr. Trevino of cardiology who recommends initiation of PCSK9 inhibitor.  Patient reports that he has had intermittent episodes of gait instability and transient visual changes.  He is currently asymptomatic but reports that this occurs almost weekly.  Discussed with the patient that if this happens again, I recommend evaluation in the ED.  MRI brain ordered to assess for any additional stroke.  Patient reports that he has been diagnosed with sleep apnea multiple times in the past.  He reports that he cannot tolerate a CPAP machine.  Discussed with him that this is an independent stroke risk factor.  He verbalized understanding.  In addition, the patient reports that he previously took Adderall secondary to excessive daytime sleepiness.  He would like to restart this.  Discussed with him that given his history of stroke and angina, he should speak with his prescriber regarding additional options.  He verbalized understanding but states that it is acceptable risk for him as  "he is having difficulty functioning.    Subjective        I have reviewed and the following portions of the patient's history were updated as appropriate: past family history, past medical history, past social history, past surgical history and problem list.    Medications:     Current Outpatient Medications:     amLODIPine (NORVASC) 2.5 MG tablet, Take 1 tablet by mouth Every Night., Disp: 90 tablet, Rfl: 3    aspirin 325 MG EC tablet, Take 1 tablet by mouth Every 6 (Six) Hours As Needed for Mild Pain., Disp: 30 tablet, Rfl: 1    atorvastatin (LIPITOR) 80 MG tablet, Take 1 tablet by mouth Every Night., Disp: 90 tablet, Rfl: 2    buPROPion (WELLBUTRIN) 75 MG tablet, TAKE 1 TABLET BY MOUTH EVERY DAY, Disp: 90 tablet, Rfl: 0    busPIRone (BUSPAR) 15 MG tablet, Take 1 tablet by mouth 3 (Three) Times a Day., Disp: 270 tablet, Rfl: 1    cyclobenzaprine (FLEXERIL) 10 MG tablet, Take 1 tablet by mouth At Night As Needed for Muscle Spasms (muscle spasm, tension)., Disp: 30 tablet, Rfl: 2    DULoxetine (CYMBALTA) 60 MG capsule, TAKE 1 CAPSULE BY MOUTH EVERY DAY, Disp: 90 capsule, Rfl: 0    fexofenadine (Allegra Allergy) 180 MG tablet, Take 1 tablet by mouth Daily., Disp: 90 tablet, Rfl: 3    propranolol (INDERAL) 20 MG tablet, Take 1 tablet by mouth 2 (Two) Times a Day., Disp: 60 tablet, Rfl: 0    Allergies:   No Known Allergies    Objective     Physical Exam:   Vital Signs:   Vitals:    12/26/24 1353   BP: 120/70   Pulse: 82   Temp: 98.4 °F (36.9 °C)   SpO2: 95%   Weight: 104 kg (229 lb 6.4 oz)   Height: 180.3 cm (70.98\")     Body mass index is 32.01 kg/m².    Physical Exam  Constitutional:       General: He is not in acute distress.  HENT:      Head: Normocephalic and atraumatic.   Eyes:      General:         Right eye: No discharge.      Pupils: Pupils are equal, round, and reactive to light.   Cardiovascular:      Rate and Rhythm: Normal rate and regular rhythm.   Pulmonary:      Effort: Pulmonary effort is normal. No " respiratory distress.   Abdominal:      General: There is no distension.      Palpations: Abdomen is soft.   Musculoskeletal:         General: Normal range of motion.      Cervical back: Normal range of motion and neck supple.   Skin:     General: Skin is warm and dry.      Capillary Refill: Capillary refill takes less than 2 seconds.   Neurological:      General: No focal deficit present.      Mental Status: He is alert and oriented to person, place, and time. Mental status is at baseline.      Motor: Motor strength is normal.  Psychiatric:         Speech: Speech normal.       Neurological Exam  Mental Status  Alert. Oriented to person, place, time and situation. Oriented to person, place, and time. Speech is normal. Language is fluent with no aphasia.    Cranial Nerves  CN II: Visual acuity is normal. Visual fields full to confrontation.  CN III, IV, VI: Pupils equal round and reactive to light bilaterally.  CN V: Facial sensation is normal.  CN VII: Full and symmetric facial movement.  CN IX, X: Palate elevates symmetrically  CN XI: Shoulder shrug strength is normal.  CN XII: Tongue midline without atrophy or fasciculations.    Motor   Strength is 5/5 throughout all four extremities.    Sensory  Light touch is normal in upper and lower extremities.     Coordination  Right: Finger-to-nose normal.Left: Finger-to-nose normal.    Gait  Casual gait is normal including stance, stride, and arm swing.         NIH Stroke Scale  Person Administering Scale: JAYLEN Bey    1a  Level of consciousness: 0=alert; keenly responsive   1b. LOC questions:  0=Answers both questions correctly   1c. LOC commands: 0=Performs both tasks correctly   2.  Best Gaze: 0=normal   3.  Visual: 0=No visual loss   4. Facial Palsy: 0=Normal symmetric movement   5a.  Motor left arm: 0=No drift, limb holds 90 (or 45) degrees for full 10 seconds   5b.  Motor right arm: 0=No drift, limb holds 90 (or 45) degrees for full 10 seconds   6a.  motor left le=No drift, limb holds 90 (or 45) degrees for full 10 seconds   6b  Motor right le=No drift, limb holds 90 (or 45) degrees for full 10 seconds   7. Limb Ataxia: 0=Absent   8.  Sensory: 0=Normal; no sensory loss   9. Best Language:  0=No aphasia, normal   10. Dysarthria: 0=Normal   11. Extinction and Inattention: 0=No abnormality    Total:   0       MODIFIED DAWSON SCALE (to be assessed for each patient having history of stroke) []Stroke history but not assessed  []0: No symptoms at all  [x]1: No significant disability despite symptoms  []2: Slight disability  []3: Moderate disability  []4: Moderately severe disability  []5: Severe disability  []6: Death        Assessment / Plan      Assessment/Plan:   Diagnoses and all orders for this visit:    1. History of stroke (Primary)  - History of a right lateral pontine infarct in 2024.  Likely etiology   - Continue aspirin 325 mg daily  - Continue atorvastatin 80 mg daily  -- Follow up with Dr. Trevino regarding PCSK9 inhibitor, angina  - BP goals less than 130/80.  Check daily and keep a log for primary care provider  - Heart healthy diet  - Increase activity as tolerated  - Return to the ED with any additional stroke symptoms  -- Consider follow up with sleep medicine for sleep apnea  -- Repeat MRI given intermittent vision changes and intermittent gait instability    2. Vision changes  - Etiology unclear.  Repeat MRI ordered to assess for new stroke  - Discussed with the patient that if symptoms should recur, he should present to the ED for further evaluation of symptoms at that time.    3. Hyperlipidemia LDL goal <70  - Continue atorvastatin 80 mg daily  - Heart healthy diet  - Further management per primary care    4. Hypertension, unspecified type  - BP goal less than 130/80.  120/70 today.  Well-controlled  - Check daily and keep a log for primary care provider  - Further management per PCP     Discussed the importance of medication  compliance Aspirin 325mg daily and Atorvastatin 80mg nightly and lifestyle modifications adequate control of blood pressure, adequate control of cholesterol (goal LDL <70), adequate control of glucose (<140, A1c goal <7), increased physical activity, and implementation of healthy diet to help reduce the risk of future cerebrovascular events.  Also discussed the signs symptoms that would warrant the patient return back to the emergency department including unilateral weakness, unilateral numbness, visual disturbances, loss of balance, speech difficulties, and/or a sudden severe headache.  Patient verbalized understanding.     Follow Up:   Return in about 3 months (around 3/26/2025).    JAYLEN Bey, AGACNP-[facility] Stroke

## 2024-12-30 ENCOUNTER — TELEPHONE (OUTPATIENT)
Dept: NEUROLOGY | Facility: CLINIC | Age: 64
End: 2024-12-30
Payer: COMMERCIAL

## 2025-01-03 ENCOUNTER — OFFICE VISIT (OUTPATIENT)
Dept: INTERNAL MEDICINE | Facility: CLINIC | Age: 65
End: 2025-01-03
Payer: COMMERCIAL

## 2025-01-03 ENCOUNTER — LAB (OUTPATIENT)
Dept: INTERNAL MEDICINE | Facility: CLINIC | Age: 65
End: 2025-01-03
Payer: COMMERCIAL

## 2025-01-03 VITALS
OXYGEN SATURATION: 96 % | TEMPERATURE: 98 F | BODY MASS INDEX: 32.2 KG/M2 | SYSTOLIC BLOOD PRESSURE: 132 MMHG | WEIGHT: 230 LBS | HEIGHT: 71 IN | RESPIRATION RATE: 16 BRPM | HEART RATE: 69 BPM | DIASTOLIC BLOOD PRESSURE: 78 MMHG

## 2025-01-03 DIAGNOSIS — F43.10 PTSD (POST-TRAUMATIC STRESS DISORDER): ICD-10-CM

## 2025-01-03 DIAGNOSIS — Z86.73 HISTORY OF CVA (CEREBROVASCULAR ACCIDENT): Primary | ICD-10-CM

## 2025-01-03 DIAGNOSIS — Z11.59 ENCOUNTER FOR HEPATITIS C SCREENING TEST FOR LOW RISK PATIENT: ICD-10-CM

## 2025-01-03 DIAGNOSIS — E55.9 VITAMIN D DEFICIENCY: ICD-10-CM

## 2025-01-03 DIAGNOSIS — G44.229 CHRONIC TENSION-TYPE HEADACHE, NOT INTRACTABLE: ICD-10-CM

## 2025-01-03 DIAGNOSIS — E78.2 MIXED HYPERLIPIDEMIA: ICD-10-CM

## 2025-01-03 DIAGNOSIS — Z23 NEED FOR PNEUMOCOCCAL 20-VALENT CONJUGATE VACCINATION: ICD-10-CM

## 2025-01-03 DIAGNOSIS — F41.9 ANXIETY: ICD-10-CM

## 2025-01-03 DIAGNOSIS — Z86.73 HISTORY OF CVA (CEREBROVASCULAR ACCIDENT): ICD-10-CM

## 2025-01-03 LAB
BASOPHILS # BLD AUTO: 0.05 10*3/MM3 (ref 0–0.2)
BASOPHILS NFR BLD AUTO: 0.6 % (ref 0–1.5)
DEPRECATED RDW RBC AUTO: 39 FL (ref 37–54)
EOSINOPHIL # BLD AUTO: 0.16 10*3/MM3 (ref 0–0.4)
EOSINOPHIL NFR BLD AUTO: 1.9 % (ref 0.3–6.2)
ERYTHROCYTE [DISTWIDTH] IN BLOOD BY AUTOMATED COUNT: 12.7 % (ref 12.3–15.4)
HBA1C MFR BLD: 6 % (ref 4.8–5.6)
HCT VFR BLD AUTO: 43.3 % (ref 37.5–51)
HGB BLD-MCNC: 15 G/DL (ref 13–17.7)
IMM GRANULOCYTES # BLD AUTO: 0.04 10*3/MM3 (ref 0–0.05)
IMM GRANULOCYTES NFR BLD AUTO: 0.5 % (ref 0–0.5)
LYMPHOCYTES # BLD AUTO: 3.01 10*3/MM3 (ref 0.7–3.1)
LYMPHOCYTES NFR BLD AUTO: 36.1 % (ref 19.6–45.3)
MCH RBC QN AUTO: 29.9 PG (ref 26.6–33)
MCHC RBC AUTO-ENTMCNC: 34.6 G/DL (ref 31.5–35.7)
MCV RBC AUTO: 86.3 FL (ref 79–97)
MONOCYTES # BLD AUTO: 0.73 10*3/MM3 (ref 0.1–0.9)
MONOCYTES NFR BLD AUTO: 8.8 % (ref 5–12)
NEUTROPHILS NFR BLD AUTO: 4.35 10*3/MM3 (ref 1.7–7)
NEUTROPHILS NFR BLD AUTO: 52.1 % (ref 42.7–76)
NRBC BLD AUTO-RTO: 0 /100 WBC (ref 0–0.2)
PLATELET # BLD AUTO: 268 10*3/MM3 (ref 140–450)
PMV BLD AUTO: 9.7 FL (ref 6–12)
RBC # BLD AUTO: 5.02 10*6/MM3 (ref 4.14–5.8)
WBC NRBC COR # BLD AUTO: 8.34 10*3/MM3 (ref 3.4–10.8)

## 2025-01-03 PROCEDURE — 82607 VITAMIN B-12: CPT | Performed by: NURSE PRACTITIONER

## 2025-01-03 PROCEDURE — 99214 OFFICE O/P EST MOD 30 MIN: CPT | Performed by: NURSE PRACTITIONER

## 2025-01-03 PROCEDURE — 36415 COLL VENOUS BLD VENIPUNCTURE: CPT | Performed by: NURSE PRACTITIONER

## 2025-01-03 PROCEDURE — 90677 PCV20 VACCINE IM: CPT | Performed by: NURSE PRACTITIONER

## 2025-01-03 PROCEDURE — 90471 IMMUNIZATION ADMIN: CPT | Performed by: NURSE PRACTITIONER

## 2025-01-03 PROCEDURE — 83036 HEMOGLOBIN GLYCOSYLATED A1C: CPT | Performed by: NURSE PRACTITIONER

## 2025-01-03 PROCEDURE — 86803 HEPATITIS C AB TEST: CPT | Performed by: NURSE PRACTITIONER

## 2025-01-03 PROCEDURE — 82306 VITAMIN D 25 HYDROXY: CPT | Performed by: NURSE PRACTITIONER

## 2025-01-03 PROCEDURE — 84443 ASSAY THYROID STIM HORMONE: CPT | Performed by: NURSE PRACTITIONER

## 2025-01-03 PROCEDURE — 85025 COMPLETE CBC W/AUTO DIFF WBC: CPT | Performed by: NURSE PRACTITIONER

## 2025-01-03 RX ORDER — TOPIRAMATE 25 MG/1
TABLET, FILM COATED ORAL
Qty: 120 TABLET | Refills: 0 | Status: SHIPPED | OUTPATIENT
Start: 2025-01-03

## 2025-01-03 NOTE — LETTER
[facility]  Vaccine Consent Form    Patient Name:  Kennedy Griffith  Patient :  1960     Vaccine(s) Ordered    Pneumococcal Conjugate Vaccine 20-Valent (PCV20)        Screening Checklist  The following questions should be completed prior to vaccination. If you answer “yes” to any question, it does not necessarily mean you should not be vaccinated. It just means we may need to clarify or ask more questions. If a question is unclear, please ask your healthcare provider to explain it.    Yes No   Any fever or moderate to severe illness today (mild illness and/or antibiotic treatment are not contraindications)?     Do you have a history of a serious reaction to any previous vaccinations, such as anaphylaxis, encephalopathy within 7 days, Guillain-[place] syndrome within 6 weeks, seizure?     Have you received any live vaccine(s) (e.g MMR, MOISES) or any other vaccines in the last month (to ensure duplicate doses aren't given)?     Do you have an anaphylactic allergy to latex (DTaP, DTaP-IPV, Hep A, Hep B, MenB, RV, Td, Tdap), baker’s yeast (Hep B, HPV), polysorbates (RSV, nirsevimab, PCV 20, Rotavirrus, Tdap, Shingrix), or gelatin (MOISES, MMR)?     Do you have an anaphylactic allergy to neomycin (Rabies, MOISES, MMR, IPV, Hep A), polymyxin B (IPV), or streptomycin (IPV)?      Any cancer, leukemia, AIDS, or other immune system disorder? (MOISES, MMR, RV)     Do you have a parent, brother, or sister with an immune system problem (if immune competence of vaccine recipient clinically verified, can proceed)? (MMR, MOISES)     Any recent steroid treatments for >2 weeks, chemotherapy, or radiation treatment? (MOISES, MMR)     Have you received antibody-containing blood transfusions or IVIG in the past 11 months (recommended interval is dependent on product)? (MMR, MOISES)     Have you taken antiviral drugs (acyclovir, famciclovir, valacyclovir for MOISES) in the last 24 or 48 hours, respectively?      Are you pregnant or planning to  "become pregnant within 1 month? (MOISES, MMR, HPV, IPV, MenB, Abrexvy; For Hep B- refer to Engerix-B; For RSV - Abrysvo is indicated for 32-36 weeks of pregnancy from September to January)     For infants, have you ever been told your child has had intussusception or a medical emergency involving obstruction of the intestine (Rotavirus)? If not for an infant, can skip this question.         *Ordering Physicians/APC should be consulted if \"yes\" is checked by the patient or guardian above.  I have received, read, and understand the Vaccine Information Statement (VIS) for each vaccine ordered.  I have considered my or my child's health status as well as the health status of my close contacts.  I have taken the opportunity to discuss my vaccine questions with my or my child's health care provider.   I have requested that the ordered vaccine(s) be given to me or my child.  I understand the benefits and risks of the vaccines.  I understand that I should remain in the clinic for 15 minutes after receiving the vaccine(s).  _________________________________________________________  Signature of Patient or Parent/Legal Guardian ____________________  Date     "

## 2025-01-03 NOTE — PROGRESS NOTES
Subjective   Kennedy Griffith is a 64 y.o. male    Chief Complaint   Patient presents with    Follow-up     Follow up on headaches.   Patient states he is still having headaches, just the same as last visit.     Patient states his right eye has been bothering him for a little bit, states he is seeing spots at times and it doesn't clear.      History of Present Illness     H/O CVA - small right vinay infarct July, 2024. Patient has a past medical history of hypertension, chronic tension headaches/migraine, dyslipidemia, ADHD, and mood disorder. The patient reports, he had acute onset of dizziness and unsteady gait when he presented to the ED at [facility]. The patient underwent a full stroke workup, MRI revealed a small right vinay infarct. He was discharged on DAPT for 21 days then go to 325 mg aspirin monotherapy. He is following closely with Neurology.  Last appt was 12/26/24 in which he reported visual disturbance.  MRI has been ordered.      Had discussed with Neuro that he does not feel like himself psychologically.  [facility]  He will make an appt ASAP.  Was previously on Adderrall for ADHD.  He states that Cardiology told him it was ok to take ths adderall    Tension HA's - chronic; not well controlled.  Has been on Topamax in the past.  No current preventative meds.      HL - chronic; current regimen is Lipitor 80 mg daily.  Goal is LDL < 70.  Cardiology has suggested PCSK9  Lab Results   Component Value Date    CHOL 166 12/18/2024    TRIG 111 12/18/2024    HDL 46 12/18/2024     12/18/2024     Depression/anxiety - pt lost is daughter to PTSD/Suicide 7-8 yrs ago.  He himself has battled depression and anxiety since.  He was on Cymbalta 60 mg daily and Buspar 10 mg BID at his initial visit and I increased his Buspar to 15 mg TID based on c/o increased anxiety.   Has also been given Ativan in the past for PRN use.  Also taking Propranolol 20 mg BID.  He is now seeing Psych APRN at  [facility].       GERD - chronic and stable on Omeprazole 20 mg PO daily     COVID - 1/29/21, 3/9/2021, 12/3/2021  Tdap - declines for today  Flu shot - 10/2024  Prevnar - updating today  Shingrix - will ck a the pharmacy  [facility]  - 2/20[facility]  Hep A - declines today       The following portions of the patient's history were reviewed and updated as appropriate: allergies, current medications, past family history, past medical history, past social history, past surgical history, and problem list.    Current Outpatient Medications:     amLODIPine (NORVASC) 2.5 MG tablet, Take 1 tablet by mouth Every Night., Disp: 90 tablet, Rfl: 3    aspirin 325 MG EC tablet, Take 1 tablet by mouth Every 6 (Six) Hours As Needed for Mild Pain., Disp: 30 tablet, Rfl: 1    atorvastatin (LIPITOR) 80 MG tablet, Take 1 tablet by mouth Every Night., Disp: 90 tablet, Rfl: 2    buPROPion (WELLBUTRIN) 75 MG tablet, TAKE 1 TABLET BY MOUTH EVERY DAY, Disp: 90 tablet, Rfl: 0    busPIRone (BUSPAR) 15 MG tablet, Take 1 tablet by mouth 3 (Three) Times a Day., Disp: 270 tablet, Rfl: 1    cyclobenzaprine (FLEXERIL) 10 MG tablet, Take 1 tablet by mouth At Night As Needed for Muscle Spasms (muscle spasm, tension)., Disp: 30 tablet, Rfl: 2    DULoxetine (CYMBALTA) 60 MG capsule, TAKE 1 CAPSULE BY MOUTH EVERY DAY, Disp: 90 capsule, Rfl: 0    fexofenadine (Allegra Allergy) 180 MG tablet, Take 1 tablet by mouth Daily., Disp: 90 tablet, Rfl: 3    propranolol (INDERAL) 20 MG tablet, Take 1 tablet by mouth 2 (Two) Times a Day., Disp: 60 tablet, Rfl: 0    topiramate (Topamax) 25 MG tablet, 1 PO QHS x 1 week, then 1 PO BID x 1 week, then 1 PO QAM and 2 PO PM x 1 week, then 2 PO BID., Disp: 120 tablet, Rfl: 0     Review of Systems   Constitutional:  Negative for chills, fatigue and fever.   Eyes:  Positive for visual disturbance.   Respiratory:  Negative for cough, chest tightness and shortness of breath.    Cardiovascular:  Negative for chest pain.  "  Gastrointestinal:  Negative for abdominal pain, diarrhea, nausea and vomiting.   Endocrine: Negative for cold intolerance and heat intolerance.   Musculoskeletal:  Negative for arthralgias.   Neurological:  Positive for headaches. Negative for dizziness.       Objective   Physical Exam  Constitutional:       Appearance: He is well-developed.   HENT:      Head: Normocephalic and atraumatic.   Eyes:      Conjunctiva/sclera: Conjunctivae normal.      Pupils: Pupils are equal, round, and reactive to light.   Cardiovascular:      Rate and Rhythm: Normal rate and regular rhythm.      Heart sounds: Normal heart sounds.   Pulmonary:      Effort: Pulmonary effort is normal.      Breath sounds: Normal breath sounds.   Abdominal:      General: Bowel sounds are normal.      Palpations: Abdomen is soft.   Musculoskeletal:         General: Normal range of motion.      Cervical back: Normal range of motion.   Skin:     General: Skin is warm and dry.   Neurological:      Mental Status: He is alert and oriented to person, place, and time.      GCS: GCS eye subscore is 4. GCS verbal subscore is 5. GCS motor subscore is 6.      Cranial Nerves: Cranial nerves 2-12 are intact.      Sensory: Sensation is intact.      Motor: Motor function is intact.      Coordination: Coordination is intact.      Gait: Gait is intact.      Deep Tendon Reflexes: Reflexes are normal and symmetric.   Psychiatric:         Behavior: Behavior normal.         Thought Content: Thought content normal.         Judgment: Judgment normal.       Vitals:    01/03/25 0937   BP: 132/78   BP Location: Left arm   Patient Position: Sitting   Cuff Size: Adult   Pulse: 69   Resp: 16   Temp: 98 °F (36.7 °C)   TempSrc: Temporal   SpO2: 96%   Weight: 104 kg (230 lb)   Height: 180.3 cm (70.98\")         Assessment & Plan   Diagnoses and all orders for this visit:    1. History of CVA (cerebrovascular accident) (Primary)  -     CBC & Differential; Future  -     TSH; Future  -    "  Vitamin B12; Future  -     Vitamin D,25-Hydroxy; Future  -     Hemoglobin A1c; Future    2. Need for pneumococcal 20-valent conjugate vaccination  -     Pneumococcal Conjugate Vaccine 20-Valent (PCV20)    3. Mixed hyperlipidemia  -     CBC & Differential; Future  -     TSH; Future  -     Vitamin B12; Future  -     Vitamin D,25-Hydroxy; Future  -     Hemoglobin A1c; Future    4. Vitamin D deficiency  -     CBC & Differential; Future  -     TSH; Future  -     Vitamin B12; Future  -     Vitamin D,25-Hydroxy; Future  -     Hemoglobin A1c; Future    5. Anxiety  -     CBC & Differential; Future  -     TSH; Future  -     Vitamin B12; Future  -     Vitamin D,25-Hydroxy; Future  -     Hemoglobin A1c; Future    6. PTSD (post-traumatic stress disorder)  -     CBC & Differential; Future  -     TSH; Future  -     Vitamin B12; Future  -     Vitamin D,25-Hydroxy; Future  -     Hemoglobin A1c; Future    7. Encounter for hepatitis C screening test for low risk patient  -     Hepatitis C Antibody; Future    8. Chronic tension-type headache, not intractable  -     topiramate (Topamax) 25 MG tablet; 1 PO QHS x 1 week, then 1 PO BID x 1 week, then 1 PO QAM and 2 PO PM x 1 week, then 2 PO BID.  Dispense: 120 tablet; Refill: 0    Schedule MRI ASAP per neuro  Labs sent  Prevnar updated  Topamax ramp up as directed  Return in about 4 weeks (around 1/31/2025) for f/u, collect labs today.

## 2025-01-04 LAB
25(OH)D3 SERPL-MCNC: 53 NG/ML (ref 30–100)
HCV AB SER QL: NORMAL
TSH SERPL DL<=0.05 MIU/L-ACNC: 4.17 UIU/ML (ref 0.27–4.2)
VIT B12 BLD-MCNC: 350 PG/ML (ref 211–946)

## 2025-01-07 RX ORDER — PROPRANOLOL HCL 20 MG
20 TABLET ORAL 2 TIMES DAILY
Qty: 60 TABLET | Refills: 0 | Status: SHIPPED | OUTPATIENT
Start: 2025-01-07

## 2025-01-28 DIAGNOSIS — F41.9 ANXIETY: ICD-10-CM

## 2025-01-28 RX ORDER — BUPROPION HYDROCHLORIDE 75 MG/1
75 TABLET ORAL DAILY
Qty: 30 TABLET | Refills: 0 | Status: SHIPPED | OUTPATIENT
Start: 2025-01-28 | End: 2025-02-03 | Stop reason: SDUPTHER

## 2025-01-28 NOTE — TELEPHONE ENCOUNTER
Rx Refill Note  Requested Prescriptions     Pending Prescriptions Disp Refills    buPROPion (WELLBUTRIN) 75 MG tablet [Pharmacy Med Name: BUPROPION HCL 75 MG TABLET] 30 tablet 0     Sig: TAKE 1 TABLET BY MOUTH EVERY DAY      Last office visit with prescribing clinician: 01/03/2025    Next office visit with prescribing clinician: 02/03/2025      Lisette Gabriel  01/28/25, 15:01 EST

## 2025-01-29 ENCOUNTER — SPECIALTY PHARMACY (OUTPATIENT)
Facility: HOSPITAL | Age: 65
End: 2025-01-29
Payer: COMMERCIAL

## 2025-01-29 DIAGNOSIS — E78.2 MIXED HYPERLIPIDEMIA: Primary | ICD-10-CM

## 2025-01-29 NOTE — PROGRESS NOTES
Specialty Pharmacy Patient Management Program  Cardiology Initial Assessment     Kennedy Griffith was referred by a Cardiology provider to the Cardiology Patient Management program offered by [facility] for Hyperlipidemia on 01/29/25.  An initial outreach was conducted, including assessment of therapy appropriateness and specialty medication education for Repatha. The patient was introduced to services offered by [facility], including: regular assessments, refill coordination, mail order delivery options, prior authorization maintenance, and financial assistance programs as applicable. The patient was also provided with contact information for the pharmacy team.     Insurance Coverage & Financial Support  [facility]     Relevant Past Medical History and Comorbidities  Relevant medical history and concomitant health conditions were discussed with the patient. The patient's chart has been reviewed for relevant past medical history and comorbid conditions and updated as necessary.  Past Medical History:   Diagnosis Date    Depression     Fatigue     chronic    History of kidney stones     Hyperlipidemia     Sleepiness     Stroke     Tension headache      Social History     Socioeconomic History    Marital status:    Tobacco Use    Smoking status: Never     Passive exposure: Never    Smokeless tobacco: Never   Vaping Use    Vaping status: Never Used   Substance and Sexual Activity    Alcohol use: Not Currently     Comment: on rare occasion    Drug use: Never    Sexual activity: Defer     Comment:        Problem list reviewed by Marcy Bailey, PharmD on 1/29/2025 at  9:42 AM    Allergies  Known allergies and reactions were discussed with the patient. The patient's chart has been reviewed for  allergy information and updated as necessary.   No Known Allergies    Allergies reviewed by Marcy Bailey, PharmD on 1/29/2025 at  9:42 AM    Relevant  Laboratory Values  Relevant laboratory values were discussed with the patient. The following specialty medication dose adjustment(s) are recommended: Begin Repatha for lipid lowering    Lab Results   Component Value Date    GLUCOSE 110 (H) 12/18/2024    CALCIUM 9.4 12/18/2024     12/18/2024    K 4.0 12/18/2024    CO2 25.8 12/18/2024     (H) 12/18/2024    BUN 20 12/18/2024    CREATININE 1.01 12/18/2024    EGFRIFAFRI 86 01/14/2022    EGFRIFNONA 71 01/14/2022    BCR 19.8 12/18/2024    ANIONGAP 8.2 12/18/2024     Lab Results   Component Value Date    CHOL 166 12/18/2024    TRIG 111 12/18/2024    HDL 46 12/18/2024     12/18/2024         Current Medication List  This medication list has been reviewed with the patient and evaluated for any interactions or necessary modifications/recommendations, and updated to include all prescription medications, OTC medications, and supplements the patient is currently taking.  This list reflects what is contained in the patient's profile, which has also been marked as reviewed to communicate to other providers it is the most up to date version of the patient's current medication therapy.     Current Outpatient Medications:     Evolocumab (REPATHA) solution auto-injector SureClick injection, Inject 1 mL under the skin into the appropriate area as directed Every 14 (Fourteen) Days., Disp: 2 mL, Rfl: 11    amLODIPine (NORVASC) 2.5 MG tablet, Take 1 tablet by mouth Every Night., Disp: 90 tablet, Rfl: 3    aspirin 325 MG EC tablet, Take 1 tablet by mouth Every 6 (Six) Hours As Needed for Mild Pain., Disp: 30 tablet, Rfl: 1    atorvastatin (LIPITOR) 80 MG tablet, Take 1 tablet by mouth Every Night., Disp: 90 tablet, Rfl: 2    buPROPion (WELLBUTRIN) 75 MG tablet, TAKE 1 TABLET BY MOUTH EVERY DAY, Disp: 30 tablet, Rfl: 0    busPIRone (BUSPAR) 15 MG tablet, Take 1 tablet by mouth 3 (Three) Times a Day., Disp: 270 tablet, Rfl: 1    cyclobenzaprine (FLEXERIL) 10 MG tablet, Take  1 tablet by mouth At Night As Needed for Muscle Spasms (muscle spasm, tension)., Disp: 30 tablet, Rfl: 2    DULoxetine (CYMBALTA) 60 MG capsule, TAKE 1 CAPSULE BY MOUTH EVERY DAY, Disp: 90 capsule, Rfl: 0    fexofenadine (Allegra Allergy) 180 MG tablet, Take 1 tablet by mouth Daily., Disp: 90 tablet, Rfl: 3    propranolol (INDERAL) 20 MG tablet, Take 1 tablet by mouth 2 (Two) Times a Day., Disp: 60 tablet, Rfl: 0    topiramate (Topamax) 25 MG tablet, 1 PO QHS x 1 week, then 1 PO BID x 1 week, then 1 PO QAM and 2 PO PM x 1 week, then 2 PO BID., Disp: 120 tablet, Rfl: 0    Medicines reviewed by Marcy Bailey, PharmD on 1/29/2025 at  9:42 AM    Drug Interactions  None    Initial Education Provided for Specialty Medication  The patient has been provided with the following education and any applicable administration techniques (i.e. self-injection) have been demonstrated for the therapies indicated. All questions and concerns have been addressed prior to the patient receiving the medication, and the patient has verbalized comprehension of the education and any materials provided. Additional patient education shall be provided and documented upon request by the patient, provider, or payer.    REPATHA® (evolocumab)  Medication Expectations   Why am I taking this medication? You are taking Repatha to lower your “bad” cholesterol (LDL-C). This medication can be used in adults with high blood cholesterol including primary hyperlipidemia and familial hypercholesterolemia.    What should I expect while on this medication? You should expect to see your cholesterol improve over time. Specifically, you should see your LDL-C decrease.    How does the medication work? Repatha works by blocking a protein called PCSK9 that contributes to high levels of bad cholesterol. It helps increase your liver's ability to remove bad cholesterol from your blood.     How long will I be on this medication for? The amount of time you will be on  this medication will be determined by your doctor based on your cholesterol and/or your risk of having a cardiac event. You will most likely be on this medication or another cholesterol medication throughout your lifetime. Do not abruptly stop this medication without talking to your doctor first.    How do I take this medication? Take as directed on your prescription label. Repatha is injected under the skin (subcutaneously) of your stomach, thigh, or upper arm. This medication is usually given one or twice a month.   What are some possible side effects? The most common side effects of Repatha include redness, itching, swelling, or pain/tenderness at the injection site, symptoms of the common cold, flu or flu-like symptoms or back pain.    What happens if I miss a dose? If you miss a dose, take it as soon as you remember if it is within 7 days from the usual day of administration then resume your original schedule. If it is beyond 7 days and you use the enrique-2-week dose, skip the missed dose and resume your normal dosing schedule.If it is beyond 7 days and you use the once-monthly dose, inject the dose and start a new schedule based on that date.      Medication Safety   What are things I should warn my doctor immediately about? Talk to your doctor if you are pregnant, planning to become pregnant, or breastfeeding. Stop the medication and tell your doctor or seek emergency medical help if you notice any signs/symptoms of an allergic reaction (severe rash, redness, hives, severe itching, trouble breathing, or swelling of the face, lips, or tongue). If you have a rubber or latex allergy, you should not use the Repatha SureClick® Autoinjector pen or the prefilled syringe, please notify your doctor or pharmacist.   What are things that I should be cautious of? Be cautious of any side effects from this medication. Talk to your doctor if any new ones develop or aren't getting better.   What are some medications that can  interact with this one? There are no known significant drug interactions with Repatha. Always tell your doctor or pharmacist immediately if you start taking any new medications, including over-the-counter medications, vitamins, and herbal supplements.      Medication Storage/Handling   How should I handle this medication? Do not shake or expose the pens, cartridges, or syringes to extreme heat or direct sunlight. Keep this medication out of reach of pets/children. Allow medication to warm at room temperature prior to administration.   How does this medication need to be stored? Store unused pens, cartridges, or syringes in the refrigerator in the original cartons to protect from light. If needed, Repatha may be kept at room temperature in the original carton for up to 30 days. Do not freeze.    How should I dispose of this medication? All the Repatha devices are single-dose and should be discarded in a sharps container after use. If your doctor decides to stop this medication, take to your local police station for proper disposal. Some pharmacies also have take-back bins for medication drop-off.      Resources/Support   How can I remind myself to take this medication? You can download reminder apps to help you manage your refills. You may also set an alarm on your phone to remind you to take your dose.    Is financial support available?  [employer] can provide co-pay cards if you have commercial insurance or patient assistance if you have Medicare or no insurance.    Which vaccines are recommended for me? Talk to your doctor about these vaccines: Flu, Coronavirus (COVID-19), Pneumococcal (pneumonia), Tdap, Hepatitis B, Zoster (shingles)          Adherence and Self-Administration  Adherence related to the patient's specialty therapy was discussed with the patient. The Adherence segment of this outreach has been reviewed and updated.     Is there a concern with patient's ability to self administer the medication correctly  and without issue?: No  Were any potential barriers to adherence identified during the initial assessment or patient education?: No  Are there any concerns regarding the patient's understanding of the importance of medication adherence?: No  Methods for Supporting Patient Adherence and/or Self-Administration: None    Open Medication Therapy Problems  No medication therapy recommendations to display    Goals of Therapy  Goals related to the patient's specialty therapy were discussed with the patient. The Patient Goals segment of this outreach has been reviewed and updated.   Goals Addressed Today        Specialty Pharmacy General Goal      LDL Goal < 70 mg/dL    Lab Results   Component Value Date     12/18/2024     (H) 07/25/2024     (H) 03/06/2024     (H) 01/14/2022                  Reassessment Plan & Follow-Up  1. Medication Therapy Changes: Begin Repatha 140mg subcutaneous every 14 days   2. Related Plans, Therapy Recommendations, or Therapy Problems to Be Addressed: Patient asked for lipid panel to be ordered and he will get labs drawn in 3-4 months.   3. Pharmacist to perform regular assessments no more than (6) months from the previous assessment.  4. Care Coordinator to set up future refill outreaches, coordinate prescription delivery, and escalate clinical questions to pharmacist.  5. Welcome information and patient satisfaction survey to be sent by specialty pharmacy team with patient's initial fill.    Attestation  Therapeutic appropriateness: Appropriate   I attest the patient was actively involved in and has agreed to the above plan of care. If the prescribed therapy is at any point deemed not appropriate based on the current or future assessments, a consultation will be initiated with the patient's specialty care provider to determine the best course of action. The revised plan of therapy will be documented along with any required assessments and/or additional patient  education provided.     Marcy Bailey, PharmD, BCPS  Clinical Specialty Pharmacist, Cardiology  1/29/2025  09:45 EST

## 2025-02-03 ENCOUNTER — OFFICE VISIT (OUTPATIENT)
Dept: INTERNAL MEDICINE | Facility: CLINIC | Age: 65
End: 2025-02-03
Payer: COMMERCIAL

## 2025-02-03 VITALS
RESPIRATION RATE: 17 BRPM | WEIGHT: 228 LBS | SYSTOLIC BLOOD PRESSURE: 118 MMHG | HEART RATE: 72 BPM | TEMPERATURE: 98 F | HEIGHT: 71 IN | OXYGEN SATURATION: 97 % | DIASTOLIC BLOOD PRESSURE: 74 MMHG | BODY MASS INDEX: 31.92 KG/M2

## 2025-02-03 DIAGNOSIS — F41.9 ANXIETY: Primary | ICD-10-CM

## 2025-02-03 DIAGNOSIS — E55.9 VITAMIN D DEFICIENCY: ICD-10-CM

## 2025-02-03 DIAGNOSIS — I10 PRIMARY HYPERTENSION: ICD-10-CM

## 2025-02-03 DIAGNOSIS — G43.909 MIGRAINE WITHOUT STATUS MIGRAINOSUS, NOT INTRACTABLE, UNSPECIFIED MIGRAINE TYPE: ICD-10-CM

## 2025-02-03 DIAGNOSIS — F43.10 PTSD (POST-TRAUMATIC STRESS DISORDER): ICD-10-CM

## 2025-02-03 DIAGNOSIS — R51.9 FREQUENT HEADACHES: ICD-10-CM

## 2025-02-03 DIAGNOSIS — F33.1 MODERATE EPISODE OF RECURRENT MAJOR DEPRESSIVE DISORDER: ICD-10-CM

## 2025-02-03 PROCEDURE — 99214 OFFICE O/P EST MOD 30 MIN: CPT | Performed by: NURSE PRACTITIONER

## 2025-02-03 RX ORDER — DULOXETIN HYDROCHLORIDE 60 MG/1
60 CAPSULE, DELAYED RELEASE ORAL DAILY
Qty: 90 CAPSULE | Refills: 1 | Status: SHIPPED | OUTPATIENT
Start: 2025-02-03

## 2025-02-03 RX ORDER — TOPIRAMATE 50 MG/1
50 TABLET, FILM COATED ORAL 2 TIMES DAILY
Qty: 180 TABLET | Refills: 1 | Status: SHIPPED | OUTPATIENT
Start: 2025-02-03

## 2025-02-03 RX ORDER — BUPROPION HYDROCHLORIDE 75 MG/1
75 TABLET ORAL DAILY
Qty: 90 TABLET | Refills: 1 | Status: SHIPPED | OUTPATIENT
Start: 2025-02-03

## 2025-02-03 RX ORDER — PROPRANOLOL HCL 20 MG
20 TABLET ORAL 2 TIMES DAILY
Qty: 180 TABLET | Refills: 1 | Status: SHIPPED | OUTPATIENT
Start: 2025-02-03

## 2025-02-03 NOTE — PROGRESS NOTES
Subjective   Kennedy Griffith is a 64 y.o. male    Chief Complaint   Patient presents with    Cerebrovascular Accident    Headache     History of Present Illness     H/O CVA - small right vinay infarct July, 2024. Patient has a past medical history of hypertension, chronic tension headaches/migraine, dyslipidemia, ADHD, and mood disorder. The patient reports, he had acute onset of dizziness and unsteady gait when he presented to the ED at [facility]. The patient underwent a full stroke workup, MRI revealed a small right vinay infarct. He was discharged on DAPT for 21 days then go to 325 mg aspirin monotherapy. He is following closely with Neurology.  Last appt was 12/26/24 in which he reported visual disturbance.  MRI has been ordered.  He was called to schedule this and has not done so.       Tension HA's - chronic; Topamax was restarted at last visit.  He is taking as directed.  Current dose is 25 mg BID.  Feels like it has helped slightly.        The following portions of the patient's history were reviewed and updated as appropriate: allergies, current medications, past family history, past medical history, past social history, past surgical history, and problem list.    Current Outpatient Medications:     amLODIPine (NORVASC) 2.5 MG tablet, Take 1 tablet by mouth Every Night., Disp: 90 tablet, Rfl: 3    aspirin 325 MG EC tablet, Take 1 tablet by mouth Every 6 (Six) Hours As Needed for Mild Pain., Disp: 30 tablet, Rfl: 1    atorvastatin (LIPITOR) 80 MG tablet, Take 1 tablet by mouth Every Night., Disp: 90 tablet, Rfl: 2    buPROPion (WELLBUTRIN) 75 MG tablet, Take 1 tablet by mouth Daily., Disp: 90 tablet, Rfl: 1    busPIRone (BUSPAR) 15 MG tablet, Take 1 tablet by mouth 3 (Three) Times a Day., Disp: 270 tablet, Rfl: 1    cyclobenzaprine (FLEXERIL) 10 MG tablet, Take 1 tablet by mouth At Night As Needed for Muscle Spasms (muscle spasm, tension)., Disp: 30 tablet, Rfl: 2    DULoxetine (CYMBALTA) 60 MG capsule, Take 1  capsule by mouth Daily., Disp: 90 capsule, Rfl: 1    Evolocumab (REPATHA) solution auto-injector SureClick injection, Inject 1 mL under the skin into the appropriate area as directed Every 14 (Fourteen) Days., Disp: 2 mL, Rfl: 11    fexofenadine (Allegra Allergy) 180 MG tablet, Take 1 tablet by mouth Daily., Disp: 90 tablet, Rfl: 3    propranolol (INDERAL) 20 MG tablet, Take 1 tablet by mouth 2 (Two) Times a Day., Disp: 180 tablet, Rfl: 1    topiramate (Topamax) 50 MG tablet, Take 1 tablet by mouth 2 (Two) Times a Day., Disp: 180 tablet, Rfl: 1     Review of Systems   Constitutional:  Negative for chills, fatigue and fever.   Respiratory:  Negative for cough, chest tightness and shortness of breath.    Cardiovascular:  Negative for chest pain.   Gastrointestinal:  Negative for abdominal pain, diarrhea, nausea and vomiting.   Endocrine: Negative for cold intolerance and heat intolerance.   Musculoskeletal:  Negative for arthralgias.   Neurological:  Positive for headaches. Negative for dizziness.       Objective   Physical Exam  Constitutional:       Appearance: He is well-developed.   HENT:      Head: Normocephalic and atraumatic.   Eyes:      Conjunctiva/sclera: Conjunctivae normal.      Pupils: Pupils are equal, round, and reactive to light.   Cardiovascular:      Rate and Rhythm: Regular rhythm.      Heart sounds: Normal heart sounds.   Pulmonary:      Effort: Pulmonary effort is normal.   Abdominal:      General: Bowel sounds are normal.      Palpations: Abdomen is soft.   Musculoskeletal:         General: Normal range of motion.      Cervical back: Normal range of motion.   Skin:     General: Skin is warm and dry.   Neurological:      Mental Status: He is alert and oriented to person, place, and time.      GCS: GCS eye subscore is 4. GCS verbal subscore is 5. GCS motor subscore is 6.      Cranial Nerves: Cranial nerves 2-12 are intact.      Sensory: Sensation is intact.      Motor: Motor function is intact.      " Coordination: Coordination is intact.      Gait: Gait is intact.      Deep Tendon Reflexes: Reflexes are normal and symmetric.   Psychiatric:         Behavior: Behavior normal.         Thought Content: Thought content normal.         Judgment: Judgment normal.       Vitals:    02/03/25 1535   BP: 118/74   BP Location: Left arm   Patient Position: Sitting   Cuff Size: Adult   Pulse: 72   Resp: 17   Temp: 98 °F (36.7 °C)   TempSrc: Infrared   SpO2: 97%   Weight: 103 kg (228 lb)   Height: 180.3 cm (70.98\")         Assessment & Plan   Diagnoses and all orders for this visit:    1. Anxiety (Primary)  -     buPROPion (WELLBUTRIN) 75 MG tablet; Take 1 tablet by mouth Daily.  Dispense: 90 tablet; Refill: 1  -     DULoxetine (CYMBALTA) 60 MG capsule; Take 1 capsule by mouth Daily.  Dispense: 90 capsule; Refill: 1    2. Moderate episode of recurrent major depressive disorder  -     DULoxetine (CYMBALTA) 60 MG capsule; Take 1 capsule by mouth Daily.  Dispense: 90 capsule; Refill: 1    3. PTSD (post-traumatic stress disorder)  -     DULoxetine (CYMBALTA) 60 MG capsule; Take 1 capsule by mouth Daily.  Dispense: 90 capsule; Refill: 1    4. Vitamin D deficiency  -     DULoxetine (CYMBALTA) 60 MG capsule; Take 1 capsule by mouth Daily.  Dispense: 90 capsule; Refill: 1    5. Frequent headaches  -     propranolol (INDERAL) 20 MG tablet; Take 1 tablet by mouth 2 (Two) Times a Day.  Dispense: 180 tablet; Refill: 1  -     topiramate (Topamax) 50 MG tablet; Take 1 tablet by mouth 2 (Two) Times a Day.  Dispense: 180 tablet; Refill: 1    6. Migraine without status migrainosus, not intractable, unspecified migraine type  -     propranolol (INDERAL) 20 MG tablet; Take 1 tablet by mouth 2 (Two) Times a Day.  Dispense: 180 tablet; Refill: 1    7. Primary hypertension  -     propranolol (INDERAL) 20 MG tablet; Take 1 tablet by mouth 2 (Two) Times a Day.  Dispense: 180 tablet; Refill: 1    Topamax increased to 50 mg BID  I have asked that he " call central scheduling to schedule MRI ASAP.  Pt VU  Meds refilled per request  Return in about 3 months (around 5/3/2025) for Annual.

## 2025-02-27 ENCOUNTER — SPECIALTY PHARMACY (OUTPATIENT)
Dept: CARDIOLOGY | Facility: CLINIC | Age: 65
End: 2025-02-27
Payer: COMMERCIAL

## 2025-02-27 NOTE — PROGRESS NOTES
Specialty Pharmacy Patient Management Program  Cardiology Specialty Pharmacy Refill Outreach      Kennedy is a 64 y.o. male contacted today regarding refills of his medication(s).    Specialty medication(s) and dose(s) confirmed: Repatha 140 mg SC every 14 days.    Other medications being refilled: n/a    Refill Questions      Flowsheet Row Most Recent Value   Changes to allergies? No   Changes to medications? No   New conditions or infections since last clinic visit No   Unplanned office visit, urgent care, ED, or hospital admission in the last 4 weeks  No   How does patient/caregiver feel medication is working? Very good   Financial problems or insurance changes  No   Since the previous refill, were any specialty medication doses or scheduled injections missed or delayed?  No   Does this patient require a clinical escalation to a pharmacist? No            Delivery Questions      Flowsheet Row Most Recent Value   Delivery method [employer]   Delivery address verified with patient/caregiver? Yes   Delivery address Home   Other address preferred n/a   Number of medications in delivery 1   Medication(s) being filled and delivered Evolocumab (REPATHA)   Doses left of specialty medications 0   Copay verified? Yes   Copay amount 15.00   Copay form of payment Credit/debit on file   Delivery Date Selection 03/04/25   Signature Required No                   Follow-up: 28 days     Mikaela Thompson CPhT  Pharmacy Care Coordinator  2/27/2025  13:35 EST

## 2025-03-03 ENCOUNTER — APPOINTMENT (OUTPATIENT)
Dept: MRI IMAGING | Facility: HOSPITAL | Age: 65
End: 2025-03-03
Payer: COMMERCIAL

## 2025-03-03 ENCOUNTER — HOSPITAL ENCOUNTER (EMERGENCY)
Facility: HOSPITAL | Age: 65
Discharge: HOME | End: 2025-03-03
Attending: EMERGENCY MEDICINE | Admitting: EMERGENCY MEDICINE
Payer: COMMERCIAL

## 2025-03-03 ENCOUNTER — APPOINTMENT (OUTPATIENT)
Dept: GENERAL RADIOLOGY | Facility: HOSPITAL | Age: 65
End: 2025-03-03
Payer: COMMERCIAL

## 2025-03-03 VITALS
WEIGHT: 228 LBS | SYSTOLIC BLOOD PRESSURE: 131 MMHG | HEART RATE: 85 BPM | RESPIRATION RATE: 18 BRPM | TEMPERATURE: 101.4 F | DIASTOLIC BLOOD PRESSURE: 75 MMHG | BODY MASS INDEX: 31.92 KG/M2 | OXYGEN SATURATION: 97 % | HEIGHT: 71 IN

## 2025-03-03 DIAGNOSIS — E87.6 HYPOKALEMIA: ICD-10-CM

## 2025-03-03 DIAGNOSIS — U07.1 COVID-19: Primary | ICD-10-CM

## 2025-03-03 DIAGNOSIS — Z86.73 HISTORY OF STROKE: ICD-10-CM

## 2025-03-03 LAB
ALBUMIN SERPL-MCNC: 4.5 G/DL (ref 3.5–5.2)
ALBUMIN/GLOB SERPL: 1.6 G/DL
ALP SERPL-CCNC: 92 U/L (ref 39–117)
ALT SERPL W P-5'-P-CCNC: 51 U/L (ref 1–41)
ANION GAP SERPL CALCULATED.3IONS-SCNC: 14 MMOL/L (ref 5–15)
AST SERPL-CCNC: 42 U/L (ref 1–40)
BACTERIA UR QL AUTO: NORMAL /HPF
BASOPHILS # BLD AUTO: 0.03 10*3/MM3 (ref 0–0.2)
BASOPHILS NFR BLD AUTO: 0.5 % (ref 0–1.5)
BILIRUB SERPL-MCNC: 0.5 MG/DL (ref 0–1.2)
BILIRUB UR QL STRIP: NEGATIVE
BUN SERPL-MCNC: 10 MG/DL (ref 8–23)
BUN/CREAT SERPL: 14.3 (ref 7–25)
CALCIUM SPEC-SCNC: 8.4 MG/DL (ref 8.6–10.5)
CHLORIDE SERPL-SCNC: 104 MMOL/L (ref 98–107)
CLARITY UR: CLEAR
CO2 SERPL-SCNC: 22 MMOL/L (ref 22–29)
COLOR UR: YELLOW
CREAT SERPL-MCNC: 0.7 MG/DL (ref 0.76–1.27)
D-LACTATE SERPL-SCNC: 1.3 MMOL/L (ref 0.5–2)
DEPRECATED RDW RBC AUTO: 39.5 FL (ref 37–54)
EGFRCR SERPLBLD CKD-EPI 2021: 101.6 ML/MIN/1.73
EOSINOPHIL # BLD AUTO: 0.02 10*3/MM3 (ref 0–0.4)
EOSINOPHIL NFR BLD AUTO: 0.3 % (ref 0.3–6.2)
ERYTHROCYTE [DISTWIDTH] IN BLOOD BY AUTOMATED COUNT: 12.6 % (ref 12.3–15.4)
FLUAV SUBTYP SPEC NAA+PROBE: NOT DETECTED
FLUBV RNA ISLT QL NAA+PROBE: NOT DETECTED
GLOBULIN UR ELPH-MCNC: 2.8 GM/DL
GLUCOSE SERPL-MCNC: 124 MG/DL (ref 65–99)
GLUCOSE UR STRIP-MCNC: NEGATIVE MG/DL
HCT VFR BLD AUTO: 43.3 % (ref 37.5–51)
HGB BLD-MCNC: 14.7 G/DL (ref 13–17.7)
HGB UR QL STRIP.AUTO: NEGATIVE
HYALINE CASTS UR QL AUTO: NORMAL /LPF
IMM GRANULOCYTES # BLD AUTO: 0.03 10*3/MM3 (ref 0–0.05)
IMM GRANULOCYTES NFR BLD AUTO: 0.5 % (ref 0–0.5)
KETONES UR QL STRIP: ABNORMAL
LEUKOCYTE ESTERASE UR QL STRIP.AUTO: NEGATIVE
LYMPHOCYTES # BLD AUTO: 0.83 10*3/MM3 (ref 0.7–3.1)
LYMPHOCYTES NFR BLD AUTO: 13.4 % (ref 19.6–45.3)
MAGNESIUM SERPL-MCNC: 1.8 MG/DL (ref 1.6–2.4)
MCH RBC QN AUTO: 29.1 PG (ref 26.6–33)
MCHC RBC AUTO-ENTMCNC: 33.9 G/DL (ref 31.5–35.7)
MCV RBC AUTO: 85.7 FL (ref 79–97)
MONOCYTES # BLD AUTO: 0.7 10*3/MM3 (ref 0.1–0.9)
MONOCYTES NFR BLD AUTO: 11.3 % (ref 5–12)
NEUTROPHILS NFR BLD AUTO: 4.58 10*3/MM3 (ref 1.7–7)
NEUTROPHILS NFR BLD AUTO: 74 % (ref 42.7–76)
NITRITE UR QL STRIP: NEGATIVE
NRBC BLD AUTO-RTO: 0 /100 WBC (ref 0–0.2)
PH UR STRIP.AUTO: 5.5 [PH] (ref 5–8)
PLATELET # BLD AUTO: 191 10*3/MM3 (ref 140–450)
PMV BLD AUTO: 9.6 FL (ref 6–12)
POTASSIUM SERPL-SCNC: 3.2 MMOL/L (ref 3.5–5.2)
PROCALCITONIN SERPL-MCNC: 0.12 NG/ML (ref 0–0.25)
PROT SERPL-MCNC: 7.3 G/DL (ref 6–8.5)
PROT UR QL STRIP: ABNORMAL
QT INTERVAL: 364 MS
QTC INTERVAL: 427 MS
RBC # BLD AUTO: 5.05 10*6/MM3 (ref 4.14–5.8)
RBC # UR STRIP: NORMAL /HPF
REF LAB TEST METHOD: NORMAL
SARS-COV-2 RNA RESP QL NAA+PROBE: DETECTED
SODIUM SERPL-SCNC: 140 MMOL/L (ref 136–145)
SP GR UR STRIP: 1.02 (ref 1–1.03)
SQUAMOUS #/AREA URNS HPF: NORMAL /HPF
T4 FREE SERPL-MCNC: 0.78 NG/DL (ref 0.92–1.68)
TSH SERPL DL<=0.05 MIU/L-ACNC: 0.82 UIU/ML (ref 0.27–4.2)
UROBILINOGEN UR QL STRIP: ABNORMAL
WBC # UR STRIP: NORMAL /HPF
WBC NRBC COR # BLD AUTO: 6.19 10*3/MM3 (ref 3.4–10.8)

## 2025-03-03 PROCEDURE — 70551 MRI BRAIN STEM W/O DYE: CPT

## 2025-03-03 PROCEDURE — 99284 EMERGENCY DEPT VISIT MOD MDM: CPT

## 2025-03-03 PROCEDURE — 83735 ASSAY OF MAGNESIUM: CPT | Performed by: EMERGENCY MEDICINE

## 2025-03-03 PROCEDURE — 93005 ELECTROCARDIOGRAM TRACING: CPT | Performed by: EMERGENCY MEDICINE

## 2025-03-03 PROCEDURE — 80053 COMPREHEN METABOLIC PANEL: CPT | Performed by: EMERGENCY MEDICINE

## 2025-03-03 PROCEDURE — 84439 ASSAY OF FREE THYROXINE: CPT | Performed by: EMERGENCY MEDICINE

## 2025-03-03 PROCEDURE — 85025 COMPLETE CBC W/AUTO DIFF WBC: CPT | Performed by: EMERGENCY MEDICINE

## 2025-03-03 PROCEDURE — 84443 ASSAY THYROID STIM HORMONE: CPT | Performed by: EMERGENCY MEDICINE

## 2025-03-03 PROCEDURE — 87636 SARSCOV2 & INF A&B AMP PRB: CPT | Performed by: EMERGENCY MEDICINE

## 2025-03-03 PROCEDURE — 71045 X-RAY EXAM CHEST 1 VIEW: CPT

## 2025-03-03 PROCEDURE — 87040 BLOOD CULTURE FOR BACTERIA: CPT | Performed by: EMERGENCY MEDICINE

## 2025-03-03 PROCEDURE — 81001 URINALYSIS AUTO W/SCOPE: CPT | Performed by: EMERGENCY MEDICINE

## 2025-03-03 PROCEDURE — 84145 PROCALCITONIN (PCT): CPT | Performed by: EMERGENCY MEDICINE

## 2025-03-03 PROCEDURE — 83605 ASSAY OF LACTIC ACID: CPT | Performed by: EMERGENCY MEDICINE

## 2025-03-03 RX ORDER — ACETAMINOPHEN 500 MG
1000 TABLET ORAL ONCE
Status: DISCONTINUED | OUTPATIENT
Start: 2025-03-03 | End: 2025-03-03 | Stop reason: HOSPADM

## 2025-03-03 RX ORDER — MECLIZINE HYDROCHLORIDE 25 MG/1
25 TABLET ORAL ONCE
Status: COMPLETED | OUTPATIENT
Start: 2025-03-03 | End: 2025-03-03

## 2025-03-03 RX ORDER — POTASSIUM CHLORIDE 1.5 G/1.58G
40 POWDER, FOR SOLUTION ORAL ONCE
Status: COMPLETED | OUTPATIENT
Start: 2025-03-03 | End: 2025-03-03

## 2025-03-03 RX ORDER — POTASSIUM CHLORIDE 750 MG/1
10 TABLET, EXTENDED RELEASE ORAL 2 TIMES DAILY
Qty: 6 TABLET | Refills: 0 | Status: SHIPPED | OUTPATIENT
Start: 2025-03-03 | End: 2025-03-06

## 2025-03-03 RX ADMIN — MECLIZINE HYDROCHLORIDE 25 MG: 25 TABLET ORAL at 11:10

## 2025-03-03 RX ADMIN — POTASSIUM CHLORIDE 40 MEQ: 1.5 FOR SOLUTION ORAL at 12:36

## 2025-03-03 NOTE — ED PROVIDER NOTES
Subjective   History of Present Illness  64-year-old male presents for evaluation of multiple complaints.  Of note, the patient has a history of prior stroke back in July 2024.  I did a thorough review of the patient's records prior to evaluating him.  He tells me that yesterday he began to feel ill with a myriad of symptoms including headache, dizziness, nausea, sore throat, myalgias, and an overall sensation of not feeling well.  Given his ongoing symptoms and his prior history of stroke, he was concerned about a potential recurrent stroke and came here to the ED to be evaluated.  He is unsure as to what may have triggered his symptoms.  He tells me that his symptoms seem to be improved when he is at rest and sitting still and seem to be worse with movement and ambulation.  He denies any thunderclap onset to his headache.  No neck pain or stiffness.      Review of Systems   HENT:  Positive for sore throat.    Gastrointestinal:  Positive for nausea.   Musculoskeletal:  Positive for myalgias.   Neurological:  Positive for dizziness and headaches.   All other systems reviewed and are negative.      Past Medical History:   Diagnosis Date   • Depression    • Fatigue     chronic   • History of kidney stones    • Hyperlipidemia    • Sleepiness    • Stroke    • Tension headache        No Known Allergies    Past Surgical History:   Procedure Laterality Date   • LIVER BIOPSY      benign   • URETEROSCOPY LASER LITHOTRIPSY WITH STENT INSERTION Right 12/16/2022    Procedure: CYSTOSCOPY, RIGHT URETEROSCOPY RETROGRADE WITH STENT INSERTION;  Surgeon: Tej Christine MD;  Location: [facility] OR;  Service: Urology;  Laterality: Right;   • URETEROSCOPY LASER LITHOTRIPSY WITH STENT INSERTION Right 12/22/2022    Procedure: URETEROSCOPY LASER LITHOTRIPSY WITH STENT INSERTION RIGHT;  Surgeon: Tej Christine MD;  Location: [facility] OR;  Service: Urology;  Laterality: Right;       Family History   Problem Relation Age of Onset   • Other  Mother         cardiac arrhythmia/pacer   • Arthritis Father    • No Known Problems Sister    • No Known Problems Brother    • Colon cancer Maternal Grandmother    • No Known Problems Maternal Grandfather    • Heart disease Paternal Grandmother    • Coronary artery disease Paternal Grandfather    • Lung cancer Paternal Grandfather    • Other Daughter         PTSD/suicide   • No Known Problems Son    • ADD / ADHD Son        Social History     Socioeconomic History   • Marital status:    Tobacco Use   • Smoking status: Never     Passive exposure: Never   • Smokeless tobacco: Never   Vaping Use   • Vaping status: Never Used   Substance and Sexual Activity   • Alcohol use: Not Currently     Comment: on rare occasion   • Drug use: Never   • Sexual activity: Defer     Comment:            Objective   Physical Exam  Vitals and nursing note reviewed.   Constitutional:       General: He is not in acute distress.     Appearance: He is well-developed. He is not diaphoretic.      Comments: Nontoxic-appearing male   HENT:      Head: Normocephalic and atraumatic.      Comments: Posterior oropharynx is clear and without erythema or exudate, uvula is midline, normal voice, no trismus  Eyes:      Pupils: Pupils are equal, round, and reactive to light.   Neck:      Vascular: No JVD.      Comments: No meningeal signs or nuchal rigidity  Cardiovascular:      Rate and Rhythm: Normal rate and regular rhythm.      Heart sounds: Normal heart sounds. No murmur heard.     No friction rub. No gallop.   Pulmonary:      Effort: Pulmonary effort is normal. No respiratory distress.      Breath sounds: Normal breath sounds. No wheezing or rales.   Abdominal:      General: Bowel sounds are normal. There is no distension.      Palpations: Abdomen is soft. There is no mass.      Tenderness: There is no abdominal tenderness. There is no guarding.   Musculoskeletal:         General: Normal range of motion.      Cervical back: Normal range  of motion.   Skin:     General: Skin is warm and dry.      Coloration: Skin is not pale.      Findings: No erythema or rash.   Neurological:      Mental Status: He is alert and oriented to person, place, and time.      Comments: Awake, alert, and oriented x3, clear and fluent speech, no ataxia or dysmetria, normal gait, neurovascularly intact distally in all fours with bounding distal pulses and normal sensation, 5 out of 5 strength in all fours, no cranial nerve deficits noted with cranial nerves II through XII grossly intact   Psychiatric:         Mood and Affect: Mood normal.         Thought Content: Thought content normal.         Judgment: Judgment normal.         Procedures           ED Course  ED Course as of 03/03/25 1446   Mon Mar 03, 2025   1201 64-year-old male presents for evaluation of multiple complaints.  Of note, the patient has a history of prior stroke back in July 2024.  I did a thorough review of his records prior to evaluating him.  He tells me that yesterday he began to feel ill with a myriad of symptoms including headache, dizziness, nausea, sore throat, myalgias, and an overall sensation of not feeling well.  Given his ongoing symptoms and prior history of stroke, he was concerned and came here to the ED for evaluation.  On arrival, the patient is nontoxic-appearing.  I was asked to come and evaluate the patient in triage.  He has no focal neurological deficits present on exam.  No meningeal signs or nuchal rigidity.  He is febrile but vital signs otherwise are reassuring.  Broad differential diagnosis.  Given the patient's history of prior stroke and ongoing dizziness, I feel that advanced neuroimaging is warranted at this time to rule out emergent central process.  We will obtain labs and imaging, and we will reassess following initial interventions [DD]   1203 Viral swab is positive for COVID-19.  Labs remarkable for mild hypokalemia at 3.2.  Oral potassium replacement initiated.   Meclizine given for symptom relief.  Tylenol given for symptom relief. [DD]   1203 I personally and independently viewed the patient's x-ray images myself, and I am in agreement with the radiologist's reading for final interpretation, particularly there is no pneumonia noted. [DD]   1203 I personally and independently reviewed the patient's MRI images and findings, and I am in agreement with the radiologist regarding MRI interpretation--particularly there is no emergent central process present. [DD]   1203 Upon reevaluation, the patient looks and feels improved. [DD]   1204   I feel that he can be safely discharged and managed on an outpatient basis at this point.  Prescription for oral potassium replacement.  Prescription for Antivert as needed.  He will follow-up with his primary care physician within the next week.  Agreeable with plan and given appropriate strict return precautions.  Doubt CNS infection at this time based on the patient's well appearance and overall clinical picture, especially given clear alternate diagnosis. [DD]      ED Course User Index  [DD] Colten Womack MD                                Total (NIH Stroke Scale): 0                 Recent Results (from the past 24 hours)   COVID-19 and FLU A/B PCR, 1 HR TAT - Swab, Nasopharynx    Collection Time: 03/03/25  9:44 AM    Specimen: Nasopharynx; Swab   Result Value Ref Range    COVID19 Detected (C) Not Detected - Ref. Range    Influenza A PCR Not Detected Not Detected    Influenza B PCR Not Detected Not Detected   Comprehensive Metabolic Panel    Collection Time: 03/03/25 10:36 AM    Specimen: Blood   Result Value Ref Range    Glucose 124 (H) 65 - 99 mg/dL    BUN 10 8 - 23 mg/dL    Creatinine 0.70 (L) 0.76 - 1.27 mg/dL    Sodium 140 136 - 145 mmol/L    Potassium 3.2 (L) 3.5 - 5.2 mmol/L    Chloride 104 98 - 107 mmol/L    CO2 22.0 22.0 - 29.0 mmol/L    Calcium 8.4 (L) 8.6 - 10.5 mg/dL    Total Protein 7.3 6.0 - 8.5 g/dL    Albumin 4.5 3.5 -  5.2 g/dL    ALT (SGPT) 51 (H) 1 - 41 U/L    AST (SGOT) 42 (H) 1 - 40 U/L    Alkaline Phosphatase 92 39 - 117 U/L    Total Bilirubin 0.5 0.0 - 1.2 mg/dL    Globulin 2.8 gm/dL    A/G Ratio 1.6 g/dL    BUN/Creatinine Ratio 14.3 7.0 - 25.0    Anion Gap 14.0 5.0 - 15.0 mmol/L    eGFR 101.6 >60.0 mL/min/1.73   Lactic Acid, Plasma    Collection Time: 03/03/25 10:36 AM    Specimen: Blood   Result Value Ref Range    Lactate 1.3 0.5 - 2.0 mmol/L   Procalcitonin    Collection Time: 03/03/25 10:36 AM    Specimen: Blood   Result Value Ref Range    Procalcitonin 0.12 0.00 - 0.25 ng/mL   T4, Free    Collection Time: 03/03/25 10:36 AM    Specimen: Blood   Result Value Ref Range    Free T4 0.78 (L) 0.92 - 1.68 ng/dL   TSH    Collection Time: 03/03/25 10:36 AM    Specimen: Blood   Result Value Ref Range    TSH 0.822 0.270 - 4.200 uIU/mL   Magnesium    Collection Time: 03/03/25 10:36 AM    Specimen: Blood   Result Value Ref Range    Magnesium 1.8 1.6 - 2.4 mg/dL   CBC Auto Differential    Collection Time: 03/03/25 10:36 AM    Specimen: Blood   Result Value Ref Range    WBC 6.19 3.40 - 10.80 10*3/mm3    RBC 5.05 4.14 - 5.80 10*6/mm3    Hemoglobin 14.7 13.0 - 17.7 g/dL    Hematocrit 43.3 37.5 - 51.0 %    MCV 85.7 79.0 - 97.0 fL    MCH 29.1 26.6 - 33.0 pg    MCHC 33.9 31.5 - 35.7 g/dL    RDW 12.6 12.3 - 15.4 %    RDW-SD 39.5 37.0 - 54.0 fl    MPV 9.6 6.0 - 12.0 fL    Platelets 191 140 - 450 10*3/mm3    Neutrophil % 74.0 42.7 - 76.0 %    Lymphocyte % 13.4 (L) 19.6 - 45.3 %    Monocyte % 11.3 5.0 - 12.0 %    Eosinophil % 0.3 0.3 - 6.2 %    Basophil % 0.5 0.0 - 1.5 %    Immature Grans % 0.5 0.0 - 0.5 %    Neutrophils, Absolute 4.58 1.70 - 7.00 10*3/mm3    Lymphocytes, Absolute 0.83 0.70 - 3.10 10*3/mm3    Monocytes, Absolute 0.70 0.10 - 0.90 10*3/mm3    Eosinophils, Absolute 0.02 0.00 - 0.40 10*3/mm3    Basophils, Absolute 0.03 0.00 - 0.20 10*3/mm3    Immature Grans, Absolute 0.03 0.00 - 0.05 10*3/mm3    nRBC 0.0 0.0 - 0.2 /100 WBC   ECG 12  Lead Other; dizzy    Collection Time: 03/03/25 10:45 AM   Result Value Ref Range    QT Interval 364 ms    QTC Interval 427 ms   Urinalysis With Culture If Indicated - Urine, Clean Catch    Collection Time: 03/03/25 11:26 AM    Specimen: Urine, Clean Catch   Result Value Ref Range    Color, UA Yellow Yellow, Straw    Appearance, UA Clear Clear    pH, UA 5.5 5.0 - 8.0    Specific Gravity, UA 1.023 1.001 - 1.030    Glucose, UA Negative Negative    Ketones, UA Trace (A) Negative    Bilirubin, UA Negative Negative    Blood, UA Negative Negative    Protein, UA 30 mg/dL (1+) (A) Negative    Leuk Esterase, UA Negative Negative    Nitrite, UA Negative Negative    Urobilinogen, UA 1.0 E.U./dL 0.2 - 1.0 E.U./dL   Urinalysis, Microscopic Only - Urine, Clean Catch    Collection Time: 03/03/25 11:26 AM    Specimen: Urine, Clean Catch   Result Value Ref Range    RBC, UA 0-2 None Seen, 0-2 /HPF    WBC, UA 0-2 None Seen, 0-2 /HPF    Bacteria, UA None Seen None Seen, Trace /HPF    Squamous Epithelial Cells, UA 0-2 None Seen, 0-2 /HPF    Hyaline Casts, UA 0-6 0 - 6 /LPF    Methodology Automated Microscopy      Note: In addition to lab results from this visit, the labs listed above may include labs taken at another facility or during a different encounter within the last 24 hours. Please correlate lab times with ED admission and discharge times for further clarification of the services performed during this visit.    MRI Brain Without Contrast   Final Result   Impression:   Significantly limited study demonstrates no acute infarct.            Electronically Signed: Sandra Bermudez MD     3/3/2025 11:58 AM EST     Workstation ID: [id number]      XR Chest 1 View   Final Result   Impression:   No active disease.            Electronically Signed: Matthew Prescott MD     3/3/2025 10:36 AM EST     Workstation ID: [id number]        Vitals:    03/03/25 1045 03/03/25 1100 03/03/25 1115 03/03/25 1127   BP: 148/78 140/75 131/75    BP Location:        Patient Position:       Pulse: 83 84 84 85   Resp:       Temp:       TempSrc:       SpO2: 97% 96% 98% 97%   Weight:       Height:         Medications   meclizine (ANTIVERT) tablet 25 mg (25 mg Oral Given 3/3/25 1110)   potassium chloride (KLOR-CON) packet 40 mEq (40 mEq Oral Given 3/3/25 1236)     ECG/EMG Results (last 24 hours)       ** No results found for the last 24 hours. **          ECG 12 Lead Other; dizzy   Final Result   Test Reason : Other~   Blood Pressure :   */*   mmHG   Vent. Rate :  83 BPM     Atrial Rate :  83 BPM      P-R Int : 132 ms          QRS Dur :  94 ms       QT Int : 364 ms       P-R-T Axes :  11 -42  17 degrees     QTcB Int : 427 ms      Normal sinus rhythm   Left axis deviation   Abnormal ECG   When compared with ECG of 24-Jul-2024 14:16,   Sinus rhythm has replaced Junctional rhythm   Nonspecific T wave abnormality has replaced inverted T waves in Inferior   leads   Confirmed by MD Womack Michael ([id number]) on 3/3/2025 12:55:23 PM      Referred By: TRUDY           Confirmed By: James Womack MD                 Medical Decision Making  Problems Addressed:  COVID-19: complicated acute illness or injury  History of stroke: complicated acute illness or injury  Hypokalemia: complicated acute illness or injury    Amount and/or Complexity of Data Reviewed  Labs: ordered.  Radiology: ordered.  ECG/medicine tests: ordered.    Risk  Prescription drug management.        Final diagnoses:   COVID-19   Hypokalemia   History of stroke       ED Disposition  ED Disposition       ED Disposition   Discharge    Condition   Stable    Comment   --               Carline Schmitt, APRN  [address] [id number]  [phone]    In 1 week           Medication List        New Prescriptions      potassium chloride 10 MEQ CR tablet  Take 1 tablet by mouth 2 (Two) Times a Day for 3 days.               Where to Get Your Medications        These medications were sent to [facility]/pharmacy #[id number] -  [place], [place] - [address] [facility] - [phone]  - [phone] FX  [address] [facility], [facility] [address]      Phone: [phone]   potassium chloride 10 MEQ CR tablet            Colten Womack MD  03/03/25 6815

## 2025-03-08 LAB — BACTERIA SPEC AEROBE CULT: NORMAL

## 2025-03-26 ENCOUNTER — SPECIALTY PHARMACY (OUTPATIENT)
Dept: CARDIOLOGY | Facility: CLINIC | Age: 65
End: 2025-03-26
Payer: COMMERCIAL

## 2025-03-26 ENCOUNTER — OFFICE VISIT (OUTPATIENT)
Dept: NEUROLOGY | Facility: CLINIC | Age: 65
End: 2025-03-26
Payer: COMMERCIAL

## 2025-03-26 VITALS
SYSTOLIC BLOOD PRESSURE: 114 MMHG | HEART RATE: 70 BPM | TEMPERATURE: 98.4 F | BODY MASS INDEX: 31.92 KG/M2 | DIASTOLIC BLOOD PRESSURE: 72 MMHG | HEIGHT: 71 IN | OXYGEN SATURATION: 98 % | WEIGHT: 228 LBS

## 2025-03-26 DIAGNOSIS — E78.2 MIXED HYPERLIPIDEMIA: ICD-10-CM

## 2025-03-26 DIAGNOSIS — G43.E09 CHRONIC MIGRAINE WITH AURA WITHOUT STATUS MIGRAINOSUS, NOT INTRACTABLE: ICD-10-CM

## 2025-03-26 DIAGNOSIS — I10 HYPERTENSION, UNSPECIFIED TYPE: ICD-10-CM

## 2025-03-26 DIAGNOSIS — Z86.73 HISTORY OF CVA (CEREBROVASCULAR ACCIDENT): Primary | ICD-10-CM

## 2025-03-26 NOTE — PROGRESS NOTES
Specialty Pharmacy Patient Management Program  Cardiology Specialty Pharmacy Refill Outreach      Kennedy is a 64 y.o. male contacted today regarding refills of his medication(s).    Specialty medication(s) and dose(s) confirmed: Repatha 140 mg SC every 14 days.    Other medications being refilled: n/a    Refill Questions      Flowsheet Row Most Recent Value   Changes to allergies? No   Changes to medications? No   New conditions or infections since last clinic visit No   Unplanned office visit, urgent care, ED, or hospital admission in the last 4 weeks  No   How does patient/caregiver feel medication is working? Very good   Financial problems or insurance changes  No   Since the previous refill, were any specialty medication doses or scheduled injections missed or delayed?  No   Does this patient require a clinical escalation to a pharmacist? No            Delivery Questions      Flowsheet Row Most Recent Value   Delivery method UPS   Delivery address verified with patient/caregiver? Yes   Delivery address Home   Other address preferred n/a   Number of medications in delivery 1   Medication(s) being filled and delivered Evolocumab (REPATHA)   Doses left of specialty medications 1 pen   Copay verified? Yes   Copay amount 15.00   Copay form of payment Credit/debit on file   Delivery Date Selection 03/27/25   Signature Required No   Do you consent to receive electronic handouts?  No                   Follow-up: 28 days     Mikaela Thompson CPhT  Pharmacy Care Coordinator  3/26/2025  09:44 EDT

## 2025-04-12 NOTE — PROGRESS NOTES
"    Follow Up Office Visit      Encounter Date: 2025  Patient Name: Kennedy Griffith  : 1960   MRN: [id number]   PCP: Carline Schmitt APRN    Referring Provider: No ref. provider found     Chief Complaint:    Chief Complaint   Patient presents with    Follow-up       History of Present Illness: Kennedy Griffith is a 64 y.o. male who is here today to follow up with stroke.    Clinic visit 2024:Kennedy Griffith is a right handed 63 y.o. male with a recent history of a small right vinay infarct as .  Patient has a past medical history of hypertension, chronic tension headaches/migraine, dyslipidemia, ADHD, and mood disorder.  The patient reports, he had acute onset of dizziness and unsteady gait when he presented to the ED at [facility].  The patient underwent a full stroke workup, MRI revealed a small right vinay infarct.  He was discharged on DAPT for 21 days then go to 325 mg aspirin monotherapy.  The patient reports, he is monitoring his blood pressure and taking daily 325 mg of aspirin and continues 80 mg atorvastatin.  The patient denies any episodes of bleeding. The patient reports today, he is at 90% baseline.  He does experience intermittent small episodes of imbalance, which has significantly improved after PT.  He is no longer using a walker.  The patient is walking independently and denies any falls.He is most bothered with what seems to be poststroke psychological effects, patient stated \"I cannot believe that I had a stroke at my age\".  The patient also verbalized multiple stress points including, recent moved from [place], his wife was recently diagnosed with cancer, and he is taking care of his grandchildren after the sudden loss of his daughter.We discussed at great lengths, the importance of psychological health after stroke.  The patient verbalizes the importance of psychological consult and he is willing to speak with his primary care physician.  Furthermore, " the patient longstanding history of daily bilateral headaches which increase in intensity throughout the day.  The patient endorses taking an extra 325 mg tablet of aspirin, for his headache pain.  We discussed the risks and benefits of taking 650 mg of aspirin daily and the patient is agreeable to stop taking aspirin for headaches.  The patient stated, he has been prescribed Topamax as an option and he would like to hold off and try 200 - 400 mg of Ibuprofen, if needed for pain relief.  Patient verbalizes understanding of bleeding risk.  He is willing to consult Neurology for care and treatment of his headaches and ADHD.  Patient denies any other neurological symptoms, including: headache, vision changes, dysesthesias, loss of consciousness, seizure or new areas of motor weakness.     Clinic visit 12/26/2024: Mr. Griffith reports to clinic today alone.  He has been compliant with his aspirin 325 mg and atorvastatin 80 mg daily.  His most recent LDL was 100.  He follows with Dr. Trevino of cardiology who recommends initiation of PCSK9 inhibitor.  Patient reports that he has had intermittent episodes of gait instability and transient visual changes.  He is currently asymptomatic but reports that this occurs almost weekly.  Discussed with the patient that if this happens again, I recommend evaluation in the ED.  MRI brain ordered to assess for any additional stroke.  Patient reports that he has been diagnosed with sleep apnea multiple times in the past.  He reports that he cannot tolerate a CPAP machine.  Discussed with him that this is an independent stroke risk factor.  He verbalized understanding.  In addition, the patient reports that he previously took Adderall secondary to excessive daytime sleepiness.  He would like to restart this.  Discussed with him that given his history of stroke and angina, he should speak with his prescriber regarding additional options.  He verbalized understanding but states that it is  acceptable risk for him as he is having difficulty functioning.    Clinic Visit 03/26/2025: Patient presents to clinic today for routine follow up. He reports no new stroke like symptoms today. He did have an event on 03/03/2025 with dizziness which was worse. Patient states that he does have headaches and dizziness often. He was diagnosed with Covid in ED and given Antivert and Potassium. I reviewed the MRI on July 2024 which was diagnosed as a right blanca acute infarct. Prior MRI on 03/04/2024 does show a similar area of hyper density bilaterally on MRI diffusion. And MRIs since show no evidence of prior stroke in Blanca. Reviewed imaging with patient.  It is possible that the Right Blanca infarct called on July 2024 actually represents arterial imaging along the Blanca. We will continue to treat presumptively given patients risk factors. He does currently have a referral to the migraine clinic, but repeated attempts to schedule has been unsuccessful. As his intermittent dizziness may be related to daily chronic migraines, I have advised patient to call [phone] to schedule his appointment with Dr. Pierce in general neurology. NIH 0 today and patient feels at baseline. No additional concerns today.     Subjective        I have reviewed and the following portions of the patient's history were updated as appropriate: past family history, past medical history, past social history, past surgical history and problem list.    Medications:     Current Outpatient Medications:     amLODIPine (NORVASC) 2.5 MG tablet, Take 1 tablet by mouth Every Night., Disp: 90 tablet, Rfl: 3    aspirin 325 MG EC tablet, Take 1 tablet by mouth Every 6 (Six) Hours As Needed for Mild Pain., Disp: 30 tablet, Rfl: 1    atorvastatin (LIPITOR) 80 MG tablet, Take 1 tablet by mouth Every Night., Disp: 90 tablet, Rfl: 2    buPROPion (WELLBUTRIN) 75 MG tablet, Take 1 tablet by mouth Daily., Disp: 90 tablet, Rfl: 1    busPIRone (BUSPAR) 15 MG tablet, Take  "1 tablet by mouth 3 (Three) Times a Day., Disp: 270 tablet, Rfl: 1    DULoxetine (CYMBALTA) 60 MG capsule, Take 1 capsule by mouth Daily., Disp: 90 capsule, Rfl: 1    Evolocumab (REPATHA) solution auto-injector SureClick injection, Inject 1 mL under the skin into the appropriate area as directed Every 14 (Fourteen) Days., Disp: 2 mL, Rfl: 11    propranolol (INDERAL) 20 MG tablet, Take 1 tablet by mouth 2 (Two) Times a Day., Disp: 180 tablet, Rfl: 1    topiramate (Topamax) 50 MG tablet, Take 1 tablet by mouth 2 (Two) Times a Day., Disp: 180 tablet, Rfl: 1    cyclobenzaprine (FLEXERIL) 10 MG tablet, Take 1 tablet by mouth At Night As Needed for Muscle Spasms (muscle spasm, tension). (Patient not taking: Reported on 3/26/2025), Disp: 30 tablet, Rfl: 2    fexofenadine (Allegra Allergy) 180 MG tablet, Take 1 tablet by mouth Daily. (Patient not taking: Reported on 3/26/2025), Disp: 90 tablet, Rfl: 3    Allergies:   No Known Allergies    Objective     Physical Exam:   Vital Signs:   Vitals:    03/26/25 1406   BP: 114/72   Pulse: 70   Temp: 98.4 °F (36.9 °C)   SpO2: 98%   Weight: 103 kg (228 lb)   Height: 180.3 cm (70.98\")     Body mass index is 31.81 kg/m².    Physical Exam  Constitutional:       General: He is not in acute distress.  HENT:      Head: Normocephalic and atraumatic.   Eyes:      General:         Right eye: No discharge.      Pupils: Pupils are equal, round, and reactive to light.   Cardiovascular:      Rate and Rhythm: Normal rate and regular rhythm.   Pulmonary:      Effort: Pulmonary effort is normal. No respiratory distress.   Abdominal:      General: There is no distension.      Palpations: Abdomen is soft.   Musculoskeletal:         General: Normal range of motion.      Cervical back: Normal range of motion and neck supple.   Skin:     General: Skin is warm and dry.      Capillary Refill: Capillary refill takes less than 2 seconds.   Neurological:      General: No focal deficit present.      Mental " Status: He is alert and oriented to person, place, and time. Mental status is at baseline.      Motor: Motor strength is normal.  Psychiatric:         Speech: Speech normal.       Neurological Exam  Mental Status  Alert. Oriented to person, place, time and situation. Oriented to person, place, and time. Speech is normal. Language is fluent with no aphasia.    Cranial Nerves  CN II: Visual acuity is normal. Visual fields full to confrontation.  CN III, IV, VI: Pupils equal round and reactive to light bilaterally.  CN V: Facial sensation is normal.  CN VII: Full and symmetric facial movement.  CN IX, X: Palate elevates symmetrically  CN XI: Shoulder shrug strength is normal.  CN XII: Tongue midline without atrophy or fasciculations.    Motor   Strength is 5/5 throughout all four extremities.    Sensory  Light touch is normal in upper and lower extremities.     Coordination  Right: Finger-to-nose normal.Left: Finger-to-nose normal.    Gait  Casual gait is normal including stance, stride, and arm swing.         NIH Stroke Scale  Person Administering Scale: Silvestre Long PA-C    1a  Level of consciousness: 0=alert; keenly responsive   1b. LOC questions:  0=Answers both questions correctly   1c. LOC commands: 0=Performs both tasks correctly   2.  Best Gaze: 0=normal   3.  Visual: 0=No visual loss   4. Facial Palsy: 0=Normal symmetric movement   5a.  Motor left arm: 0=No drift, limb holds 90 (or 45) degrees for full 10 seconds   5b.  Motor right arm: 0=No drift, limb holds 90 (or 45) degrees for full 10 seconds   6a. motor left le=No drift, limb holds 90 (or 45) degrees for full 10 seconds   6b  Motor right le=No drift, limb holds 90 (or 45) degrees for full 10 seconds   7. Limb Ataxia: 0=Absent   8.  Sensory: 0=Normal; no sensory loss   9. Best Language:  0=No aphasia, normal   10. Dysarthria: 0=Normal   11. Extinction and Inattention: 0=No abnormality    Total:   0       MODIFIED DAWSON SCALE (to be assessed  for each patient having history of stroke) []Stroke history but not assessed  []0: No symptoms at all  [x]1: No significant disability despite symptoms  []2: Slight disability  []3: Moderate disability  []4: Moderately severe disability  []5: Severe disability  []6: Death      MRI Brain Without Contrast  Result Date: 3/3/2025  Impression: Significantly limited study demonstrates no acute infarct. Electronically Signed: Sandra Bermudez MD  3/3/2025 11:58 AM EST  Workstation ID: [id number]    XR Chest 1 View  Result Date: 3/3/2025  Impression: No active disease. Electronically Signed: Matthew Prescott MD  3/3/2025 10:36 AM EST  Workstation ID: [id number]     Lab Results   Component Value Date    GLUCOSE 124 (H) 03/03/2025    BUN 10 03/03/2025    CREATININE 0.70 (L) 03/03/2025     03/03/2025    K 3.2 (L) 03/03/2025     03/03/2025    CALCIUM 8.4 (L) 03/03/2025    PROTEINTOT 7.3 03/03/2025    ALBUMIN 4.5 03/03/2025    ALT 51 (H) 03/03/2025    AST 42 (H) 03/03/2025    ALKPHOS 92 03/03/2025    BILITOT 0.5 03/03/2025    GLOB 2.8 03/03/2025    AGRATIO 1.6 03/03/2025    BCR 14.3 03/03/2025    ANIONGAP 14.0 03/03/2025    EGFR 101.6 03/03/2025      CBC w/diff          7/26/2024    06:24 1/3/2025    10:51 3/3/2025    10:36   CBC w/Diff   WBC 8.29  8.34  6.19    RBC 5.20  5.02  5.05    Hemoglobin 15.2  15.0  14.7    Hematocrit 43.9  43.3  43.3    MCV 84.4  86.3  85.7    MCH 29.2  29.9  29.1    MCHC 34.6  34.6  33.9    RDW 12.7  12.7  12.6    Platelets 239  268  191    Neutrophil Rel % 50.2  52.1  74.0    Immature Granulocyte Rel % 0.2  0.5  0.5    Lymphocyte Rel % 40.2  36.1  13.4    Monocyte Rel % 7.0  8.8  11.3    Eosinophil Rel % 1.8  1.9  0.3    Basophil Rel % 0.6  0.6  0.5       Most Recent A1C          1/3/2025    10:51   HGBA1C Most Recent   Hemoglobin A1C 6.00       Lipid Panel          7/25/2024    06:27 12/18/2024    14:21   Lipid Panel   Total Cholesterol 231  166    Triglycerides 120  111    HDL Cholesterol 44  46     VLDL Cholesterol 22  20    LDL Cholesterol  165  100    LDL/HDL Ratio 3.70  2.13         Assessment / Plan      Assessment/Plan:   Diagnoses and all orders for this visit:    # History of stroke (Primary)  - right lateral pontine infarct on 2024 diagnosed per Dr. Hinds.  Likely etiology   - Possible artery imaged along right vinay given similar appearance on prior MRI with symptoms possibly explained by complex migraine  - Continue aspirin 325 mg daily  - Follow with migraine clinic Dr. Pierce  - Heart healthy diet  - Increase activity as tolerated  - Return to the ED with any additional stroke symptoms  -- Follow in Stroke clinic in 6 months.     # Hyperlipidemia LDL goal <70  - Continue atorvastatin 80 mg daily, Continue Repatha sub-q  - Heart healthy diet  - Further management per primary care    # Hypertension, unspecified type  - BP goal less than 130/80.  114/72 today.  Well-controlled  - Check daily and keep a log for primary care provider  - Further management per PCP     Discussed the importance of medication compliance Aspirin 325mg daily and Atorvastatin 80mg nightly and lifestyle modifications adequate control of blood pressure, adequate control of cholesterol (goal LDL <70), adequate control of glucose (<140, A1c goal <7), increased physical activity, and implementation of healthy diet to help reduce the risk of future cerebrovascular events.  Also discussed the signs symptoms that would warrant the patient return back to the emergency department including unilateral weakness, unilateral numbness, visual disturbances, loss of balance, speech difficulties, and/or a sudden severe headache.  Patient verbalized understanding.     Follow Up:   Return in about 6 months (around 2025).    Silvestre Long PA-C,  [facility]

## 2025-04-25 ENCOUNTER — SPECIALTY PHARMACY (OUTPATIENT)
Dept: CARDIOLOGY | Facility: CLINIC | Age: 65
End: 2025-04-25
Payer: COMMERCIAL

## 2025-04-25 NOTE — PROGRESS NOTES
Specialty Pharmacy Patient Management Program  Cardiology Specialty Pharmacy Refill Outreach      Kennedy is a 64 y.o. male contacted today regarding refills of his medication(s).    Specialty medication(s) and dose(s) confirmed: Repatha 140mg subcutaneous every 14 days   Other medications being refilled: N/A    Refill Questions      Flowsheet Row Most Recent Value   Changes to allergies? No   Changes to medications? No   New conditions or infections since last clinic visit No   Unplanned office visit, urgent care, ED, or hospital admission in the last 4 weeks  No   How does patient/caregiver feel medication is working? Very good   Financial problems or insurance changes  No   Since the previous refill, were any specialty medication doses or scheduled injections missed or delayed?  No   Does this patient require a clinical escalation to a pharmacist? No            Delivery Questions      Flowsheet Row Most Recent Value   Delivery method [employer]   Delivery address verified with patient/caregiver? Yes   Delivery address Home   Number of medications in delivery 1   Medication(s) being filled and delivered Evolocumab (REPATHA)   Doses left of specialty medications 0 pens   Copay verified? Yes   Copay amount 15.00   Copay form of payment Credit/debit on file   Delivery Date Selection 04/29/25   Signature Required No                   Follow-up: 28 days     Marcy Bailey, Dayan, BCPS  Clinical Specialty Pharmacist, Cardiology  4/25/2025  15:36 EDT

## 2025-05-05 ENCOUNTER — OFFICE VISIT (OUTPATIENT)
Dept: INTERNAL MEDICINE | Facility: CLINIC | Age: 65
End: 2025-05-05
Payer: COMMERCIAL

## 2025-05-05 VITALS
TEMPERATURE: 97.5 F | BODY MASS INDEX: 33.53 KG/M2 | SYSTOLIC BLOOD PRESSURE: 108 MMHG | DIASTOLIC BLOOD PRESSURE: 76 MMHG | HEART RATE: 68 BPM | RESPIRATION RATE: 18 BRPM | OXYGEN SATURATION: 95 % | WEIGHT: 226.4 LBS | HEIGHT: 69 IN

## 2025-05-05 DIAGNOSIS — F41.9 ANXIETY: ICD-10-CM

## 2025-05-05 DIAGNOSIS — E78.2 MIXED HYPERLIPIDEMIA: ICD-10-CM

## 2025-05-05 DIAGNOSIS — Z23 NEED FOR SHINGLES VACCINE: ICD-10-CM

## 2025-05-05 DIAGNOSIS — R42 DIZZINESS: ICD-10-CM

## 2025-05-05 DIAGNOSIS — Z86.73 HISTORY OF STROKE: ICD-10-CM

## 2025-05-05 DIAGNOSIS — Z12.11 SCREENING FOR COLON CANCER: ICD-10-CM

## 2025-05-05 DIAGNOSIS — E55.9 VITAMIN D DEFICIENCY: ICD-10-CM

## 2025-05-05 DIAGNOSIS — Z12.5 SCREENING FOR PROSTATE CANCER: ICD-10-CM

## 2025-05-05 DIAGNOSIS — R73.09 ABNORMAL GLUCOSE: ICD-10-CM

## 2025-05-05 DIAGNOSIS — F33.1 MODERATE EPISODE OF RECURRENT MAJOR DEPRESSIVE DISORDER: ICD-10-CM

## 2025-05-05 DIAGNOSIS — Z00.00 ROUTINE GENERAL MEDICAL EXAMINATION AT A HEALTH CARE FACILITY: Primary | ICD-10-CM

## 2025-05-05 PROCEDURE — 90750 HZV VACC RECOMBINANT IM: CPT | Performed by: NURSE PRACTITIONER

## 2025-05-05 PROCEDURE — 99397 PER PM REEVAL EST PAT 65+ YR: CPT | Performed by: NURSE PRACTITIONER

## 2025-05-05 PROCEDURE — 90471 IMMUNIZATION ADMIN: CPT | Performed by: NURSE PRACTITIONER

## 2025-05-05 RX ORDER — MECLIZINE HYDROCHLORIDE 25 MG/1
25 TABLET ORAL 3 TIMES DAILY PRN
Qty: 90 TABLET | Refills: 2 | Status: SHIPPED | OUTPATIENT
Start: 2025-05-05

## 2025-05-05 RX ORDER — ATORVASTATIN CALCIUM 80 MG/1
80 TABLET, FILM COATED ORAL NIGHTLY
Qty: 90 TABLET | Refills: 1 | Status: SHIPPED | OUTPATIENT
Start: 2025-05-05

## 2025-05-05 RX ORDER — BUPROPION HYDROCHLORIDE 100 MG/1
100 TABLET ORAL EVERY MORNING
Qty: 90 TABLET | Refills: 1 | Status: SHIPPED | OUTPATIENT
Start: 2025-05-05

## 2025-05-05 NOTE — PROGRESS NOTES
Subjective   Kennedy Griffith is a 64 y.o. male    Chief Complaint   Patient presents with    Annual Exam     Needs info for neurologist   Memory problems (referral?)   Dizziness (would like to try meclizine again)      History of Present Illness     H/O CVA - small right vinay infarct July, 2024. Patient has a past medical history of hypertension, chronic tension headaches/migraine, dyslipidemia, ADHD, and mood disorder. The patient reports, he had acute onset of dizziness and unsteady gait when he presented to the ED at [facility]. The patient underwent a full stroke workup, MRI revealed a small right vinay infarct. He was discharged on DAPT for 21 days then go to 325 mg aspirin monotherapy. He is following closely with Neurology. Last f/u was 3/2025    Tension HA's - chronic; Topamax was restarted at last visit, but he stopped b/c it was not helping.  He was referred to Dr. Pierce, but he has not been scheduled.       Had discussed with Neuro that he does not feel like himself psychologically.  He is established with [facility] or [facility].  He will make an appt ASAP.  Was previously on Adderrall for ADHD.  He states that Cardiology told him it was ok to take ths adderall     HL - chronic; current regimen is Lipitor 80 mg daily and repatha (per Cards).    Lab Results   Component Value Date    CHOL 166 12/18/2024    TRIG 111 12/18/2024    HDL 46 12/18/2024     12/18/2024     Depression/anxiety - pt lost is daughter to PTSD/Suicide 7-8 yrs ago.  He himself has battled depression and anxiety since.  He was on Cymbalta 60 mg daily and Buspar 10 mg BID at his initial visit and I increased his Buspar to 15 mg TID based on c/o increased anxiety.   Has also been given Ativan in the past for PRN use.  Also taking Propranolol 20 mg BID and Wellbutrin 75 mg daily.  [facility] but he has not seen her in quite some time.       GERD - chronic and stable on Omeprazole 20 mg PO daily     COVID -  1/29/21, 3/9/2021, 12/3/2021  Tdap - declines for today  Flu shot - 10/2024  Prevnar - updating today  Shingrix - will ck a the pharmacy  Cologyolanda  - 2/2022  Hep A - declines today     Diet - trying to make healthier decisions    Exercise - miles of walking while at work and mows his lawn    Social History     Socioeconomic History    Marital status:    Tobacco Use    Smoking status: Never     Passive exposure: Never    Smokeless tobacco: Never   Vaping Use    Vaping status: Never Used   Substance and Sexual Activity    Alcohol use: Not Currently     Comment: on rare occasion    Drug use: Never    Sexual activity: Defer     Comment:          The following portions of the patient's history were reviewed and updated as appropriate: allergies, current medications, past family history, past medical history, past social history, past surgical history, and problem list.    Current Outpatient Medications:     amLODIPine (NORVASC) 2.5 MG tablet, Take 1 tablet by mouth Every Night., Disp: 90 tablet, Rfl: 3    aspirin 325 MG EC tablet, Take 1 tablet by mouth Every 6 (Six) Hours As Needed for Mild Pain., Disp: 30 tablet, Rfl: 1    atorvastatin (LIPITOR) 80 MG tablet, Take 1 tablet by mouth Every Night., Disp: 90 tablet, Rfl: 1    busPIRone (BUSPAR) 15 MG tablet, Take 1 tablet by mouth 3 (Three) Times a Day., Disp: 270 tablet, Rfl: 1    DULoxetine (CYMBALTA) 60 MG capsule, Take 1 capsule by mouth Daily., Disp: 90 capsule, Rfl: 1    Evolocumab (REPATHA) solution auto-injector SureClick injection, Inject 1 mL under the skin into the appropriate area as directed Every 14 (Fourteen) Days., Disp: 2 mL, Rfl: 11    propranolol (INDERAL) 20 MG tablet, Take 1 tablet by mouth 2 (Two) Times a Day., Disp: 180 tablet, Rfl: 1    buPROPion (WELLBUTRIN) 100 MG tablet, Take 1 tablet by mouth Every Morning., Disp: 90 tablet, Rfl: 1    meclizine (ANTIVERT) 25 MG tablet, Take 1 tablet by mouth 3 (Three) Times a Day As Needed for  Dizziness., Disp: 90 tablet, Rfl: 2     Review of Systems   Constitutional:  Negative for appetite change, chills, fatigue, fever and unexpected weight change.   HENT:  Negative for congestion, ear pain, nosebleeds, rhinorrhea and tinnitus.    Eyes:  Negative for pain.   Respiratory:  Negative for cough, chest tightness and shortness of breath.    Cardiovascular:  Negative for chest pain, palpitations and leg swelling.   Gastrointestinal:  Negative for abdominal distention, abdominal pain, blood in stool, constipation, diarrhea, nausea and vomiting.   Endocrine: Negative for cold intolerance, heat intolerance, polydipsia, polyphagia and polyuria.   Genitourinary:  Negative for dysuria, flank pain, frequency, hematuria and urgency.   Musculoskeletal:  Negative for arthralgias, back pain, gait problem, joint swelling, myalgias and neck pain.   Skin:  Negative for color change, pallor, rash and wound.   Allergic/Immunologic: Negative for environmental allergies and food allergies.   Neurological:  Negative for dizziness, syncope, weakness, light-headedness, numbness and headaches.   Hematological:  Negative for adenopathy. Does not bruise/bleed easily.   Psychiatric/Behavioral:  Negative for behavioral problems and suicidal ideas. The patient is not nervous/anxious.        Objective   Physical Exam  Constitutional:       Appearance: He is well-developed.   HENT:      Head: Normocephalic and atraumatic.   Eyes:      Conjunctiva/sclera: Conjunctivae normal.      Pupils: Pupils are equal, round, and reactive to light.   Cardiovascular:      Rate and Rhythm: Normal rate and regular rhythm.      Heart sounds: Normal heart sounds.   Pulmonary:      Effort: Pulmonary effort is normal.      Breath sounds: Normal breath sounds.   Abdominal:      General: Bowel sounds are normal.      Palpations: Abdomen is soft.   Musculoskeletal:         General: Normal range of motion.      Cervical back: Normal range of motion.   Skin:      "General: Skin is warm and dry.   Neurological:      Mental Status: He is alert and oriented to person, place, and time.      Deep Tendon Reflexes: Reflexes are normal and symmetric.   Psychiatric:         Behavior: Behavior normal.         Thought Content: Thought content normal.         Judgment: Judgment normal.       Vitals:    05/05/25 1324   BP: 108/76   BP Location: Right arm   Patient Position: Sitting   Cuff Size: Adult   Pulse: 68   Resp: 18   Temp: 97.5 °F (36.4 °C)   TempSrc: Infrared   SpO2: 95%   Weight: 103 kg (226 lb 6.4 oz)   Height: 175.9 cm (69.25\")         Assessment & Plan   Diagnoses and all orders for this visit:    1. Routine general medical examination at a health care facility (Primary)  -     CBC & Differential; Future  -     Comprehensive Metabolic Panel; Future  -     Lipid Panel; Future  -     TSH; Future  -     Vitamin B12; Future  -     Vitamin D,25-Hydroxy; Future  -     Hemoglobin A1c; Future  -     PSA Screen; Future    2. Need for shingles vaccine  -     Shingrix Vaccine    3. Moderate episode of recurrent major depressive disorder  -     buPROPion (WELLBUTRIN) 100 MG tablet; Take 1 tablet by mouth Every Morning.  Dispense: 90 tablet; Refill: 1  -     Ambulatory Referral to Psychiatry  -     CBC & Differential; Future  -     Comprehensive Metabolic Panel; Future  -     Lipid Panel; Future  -     TSH; Future  -     Vitamin B12; Future  -     Vitamin D,25-Hydroxy; Future  -     Hemoglobin A1c; Future  -     PSA Screen; Future    4. Anxiety  -     Ambulatory Referral to Psychiatry  -     CBC & Differential; Future  -     Comprehensive Metabolic Panel; Future  -     Lipid Panel; Future  -     TSH; Future  -     Vitamin B12; Future  -     Vitamin D,25-Hydroxy; Future  -     Hemoglobin A1c; Future  -     PSA Screen; Future    5. Mixed hyperlipidemia  -     atorvastatin (LIPITOR) 80 MG tablet; Take 1 tablet by mouth Every Night.  Dispense: 90 tablet; Refill: 1  -     CBC & Differential; " Future  -     Comprehensive Metabolic Panel; Future  -     Lipid Panel; Future  -     TSH; Future  -     Vitamin B12; Future  -     Vitamin D,25-Hydroxy; Future  -     Hemoglobin A1c; Future  -     PSA Screen; Future    6. Vitamin D deficiency  -     CBC & Differential; Future  -     Comprehensive Metabolic Panel; Future  -     Lipid Panel; Future  -     TSH; Future  -     Vitamin B12; Future  -     Vitamin D,25-Hydroxy; Future  -     Hemoglobin A1c; Future  -     PSA Screen; Future    7. History of stroke  -     CBC & Differential; Future  -     Comprehensive Metabolic Panel; Future  -     Lipid Panel; Future  -     TSH; Future  -     Vitamin B12; Future  -     Vitamin D,25-Hydroxy; Future  -     Hemoglobin A1c; Future  -     PSA Screen; Future    8. Abnormal glucose  -     CBC & Differential; Future  -     Comprehensive Metabolic Panel; Future  -     Lipid Panel; Future  -     TSH; Future  -     Vitamin B12; Future  -     Vitamin D,25-Hydroxy; Future  -     Hemoglobin A1c; Future  -     PSA Screen; Future    9. Screening for prostate cancer  -     PSA Screen; Future    10. Screening for colon cancer  -     Cologuard - Stool, Per Rectum; Future    11. Dizziness  -     meclizine (ANTIVERT) 25 MG tablet; Take 1 tablet by mouth 3 (Three) Times a Day As Needed for Dizziness.  Dispense: 90 tablet; Refill: 2      Will ck labs   Referred back to psych  Wellbutrin increased to 100 mg   Cologuard ordered  Counseling - diet and exercise  Return in about 3 months (around 8/5/2025) for f/u.

## 2025-05-05 NOTE — LETTER
[facility]  Vaccine Consent Form    Patient Name:  Kennedy Griffith  Patient :  1960     Vaccine(s) Ordered    Shingrix Vaccine        Screening Checklist  The following questions should be completed prior to vaccination. If you answer “yes” to any question, it does not necessarily mean you should not be vaccinated. It just means we may need to clarify or ask more questions. If a question is unclear, please ask your healthcare provider to explain it.    Yes No   Any fever or moderate to severe illness today (mild illness and/or antibiotic treatment are not contraindications)?     Do you have a history of a serious reaction to any previous vaccinations, such as anaphylaxis, encephalopathy within 7 days, Guillain-Tell syndrome within 6 weeks, seizure?     Have you received any live vaccine(s) (e.g MMR, MOISES) or any other vaccines in the last month (to ensure duplicate doses aren't given)?     Do you have an anaphylactic allergy to latex (DTaP, DTaP-IPV, Hep A, Hep B, MenB, RV, Td, Tdap), baker’s yeast (Hep B, HPV), polysorbates (RSV, nirsevimab, PCV 20, Rotavirrus, Tdap, Shingrix), or gelatin (MOISES, MMR)?     Do you have an anaphylactic allergy to neomycin (Rabies, MOISES, MMR, IPV, Hep A), polymyxin B (IPV), or streptomycin (IPV)?      Any cancer, leukemia, AIDS, or other immune system disorder? (MOISES, MMR, RV)     Do you have a parent, brother, or sister with an immune system problem (if immune competence of vaccine recipient clinically verified, can proceed)? (MMR, MOISES)     Any recent steroid treatments for >2 weeks, chemotherapy, or radiation treatment? (MOISES, MMR)     Have you received antibody-containing blood transfusions or IVIG in the past 11 months (recommended interval is dependent on product)? (MMR, MOISES)     Have you taken antiviral drugs (acyclovir, famciclovir, valacyclovir for MOISES) in the last 24 or 48 hours, respectively?      Are you pregnant or planning to become pregnant within 1 month?  "(MOISES, MMR, HPV, IPV, MenB, Abrexvy; For Hep B- refer to Engerix-B; For RSV - Abrysvo is indicated for 32-36 weeks of pregnancy from September to January)     For infants, have you ever been told your child has had intussusception or a medical emergency involving obstruction of the intestine (Rotavirus)? If not for an infant, can skip this question.         *Ordering Physicians/APC should be consulted if \"yes\" is checked by the patient or guardian above.  I have received, read, and understand the Vaccine Information Statement (VIS) for each vaccine ordered.  I have considered my or my child's health status as well as the health status of my close contacts.  I have taken the opportunity to discuss my vaccine questions with my or my child's health care provider.   I have requested that the ordered vaccine(s) be given to me or my child.  I understand the benefits and risks of the vaccines.  I understand that I should remain in the clinic for 15 minutes after receiving the vaccine(s).  _________________________________________________________  Signature of Patient or Parent/Legal Guardian ____________________  Date     "

## 2025-05-30 ENCOUNTER — TELEPHONE (OUTPATIENT)
Dept: CARDIOLOGY | Facility: CLINIC | Age: 65
End: 2025-05-30
Payer: COMMERCIAL

## 2025-05-30 ENCOUNTER — SPECIALTY PHARMACY (OUTPATIENT)
Dept: CARDIOLOGY | Facility: CLINIC | Age: 65
End: 2025-05-30
Payer: COMMERCIAL

## 2025-05-30 NOTE — PROGRESS NOTES
Specialty Pharmacy Patient Management Program  Cardiology Specialty Pharmacy Refill Outreach      Kennedy is a 64 y.o. male contacted today regarding refills of his medication(s).    Specialty medication(s) and dose(s) confirmed: Repatha  Other medications being refilled: n/a    Refill Questions      Flowsheet Row Most Recent Value   Changes to allergies? No   Changes to medications? No   New conditions or infections since last clinic visit No   Unplanned office visit, urgent care, ED, or hospital admission in the last 4 weeks  No   How does patient/caregiver feel medication is working? Very good   Financial problems or insurance changes  No   Since the previous refill, were any specialty medication doses or scheduled injections missed or delayed?  No   Does this patient require a clinical escalation to a pharmacist? No            Delivery Questions      Flowsheet Row Most Recent Value   Delivery method UPS   Delivery address verified with patient/caregiver? Yes   Delivery address Home   Other address preferred n/a   Number of medications in delivery 1   Medication(s) being filled and delivered Evolocumab (REPATHA)   Doses left of specialty medications 1   Copay verified? Yes   Copay amount $15   Copay form of payment Credit/debit on file   Delivery Date Selection 06/03/25   Signature Required No   Do you consent to receive electronic handouts?  No                   Follow-up: 21 days     Virgilio Ashraf CPhT-Adv  Pharmacy Care Coordinator  5/30/2025  13:03 EDT

## 2025-06-24 ENCOUNTER — LAB (OUTPATIENT)
Dept: LAB | Facility: HOSPITAL | Age: 65
End: 2025-06-24
Payer: COMMERCIAL

## 2025-06-24 DIAGNOSIS — Z12.5 SCREENING FOR PROSTATE CANCER: ICD-10-CM

## 2025-06-24 DIAGNOSIS — E78.2 MIXED HYPERLIPIDEMIA: ICD-10-CM

## 2025-06-24 DIAGNOSIS — Z00.00 ROUTINE GENERAL MEDICAL EXAMINATION AT A HEALTH CARE FACILITY: ICD-10-CM

## 2025-06-24 DIAGNOSIS — Z86.73 HISTORY OF STROKE: ICD-10-CM

## 2025-06-24 DIAGNOSIS — F41.9 ANXIETY: ICD-10-CM

## 2025-06-24 DIAGNOSIS — E55.9 VITAMIN D DEFICIENCY: ICD-10-CM

## 2025-06-24 DIAGNOSIS — R73.09 ABNORMAL GLUCOSE: ICD-10-CM

## 2025-06-24 DIAGNOSIS — F33.1 MODERATE EPISODE OF RECURRENT MAJOR DEPRESSIVE DISORDER: ICD-10-CM

## 2025-06-24 LAB
25(OH)D3 SERPL-MCNC: 68.4 NG/ML (ref 30–100)
ALBUMIN SERPL-MCNC: 4.1 G/DL (ref 3.5–5.2)
ALBUMIN/GLOB SERPL: 1.4 G/DL
ALP SERPL-CCNC: 83 U/L (ref 39–117)
ALT SERPL W P-5'-P-CCNC: 32 U/L (ref 1–41)
ANION GAP SERPL CALCULATED.3IONS-SCNC: 12.5 MMOL/L (ref 5–15)
AST SERPL-CCNC: 27 U/L (ref 1–40)
BASOPHILS # BLD AUTO: 0.04 10*3/MM3 (ref 0–0.2)
BASOPHILS NFR BLD AUTO: 0.5 % (ref 0–1.5)
BILIRUB SERPL-MCNC: 0.8 MG/DL (ref 0–1.2)
BUN SERPL-MCNC: 13 MG/DL (ref 8–23)
BUN/CREAT SERPL: 11.9 (ref 7–25)
CALCIUM SPEC-SCNC: 9.3 MG/DL (ref 8.6–10.5)
CHLORIDE SERPL-SCNC: 105 MMOL/L (ref 98–107)
CHOLEST SERPL-MCNC: 68 MG/DL (ref 0–200)
CO2 SERPL-SCNC: 26.5 MMOL/L (ref 22–29)
CREAT SERPL-MCNC: 1.09 MG/DL (ref 0.76–1.27)
DEPRECATED RDW RBC AUTO: 39 FL (ref 37–54)
EGFRCR SERPLBLD CKD-EPI 2021: 74.9 ML/MIN/1.73
EOSINOPHIL # BLD AUTO: 0.19 10*3/MM3 (ref 0–0.4)
EOSINOPHIL NFR BLD AUTO: 2.4 % (ref 0.3–6.2)
ERYTHROCYTE [DISTWIDTH] IN BLOOD BY AUTOMATED COUNT: 12.5 % (ref 12.3–15.4)
GLOBULIN UR ELPH-MCNC: 2.9 GM/DL
GLUCOSE SERPL-MCNC: 105 MG/DL (ref 65–99)
HBA1C MFR BLD: 6.1 % (ref 4.8–5.6)
HCT VFR BLD AUTO: 44.9 % (ref 37.5–51)
HDLC SERPL-MCNC: 44 MG/DL (ref 40–60)
HGB BLD-MCNC: 15 G/DL (ref 13–17.7)
IMM GRANULOCYTES # BLD AUTO: 0.02 10*3/MM3 (ref 0–0.05)
IMM GRANULOCYTES NFR BLD AUTO: 0.2 % (ref 0–0.5)
LDLC SERPL CALC-MCNC: 7 MG/DL (ref 0–100)
LDLC/HDLC SERPL: 0.18 {RATIO}
LYMPHOCYTES # BLD AUTO: 3.22 10*3/MM3 (ref 0.7–3.1)
LYMPHOCYTES NFR BLD AUTO: 39.9 % (ref 19.6–45.3)
MCH RBC QN AUTO: 29.1 PG (ref 26.6–33)
MCHC RBC AUTO-ENTMCNC: 33.4 G/DL (ref 31.5–35.7)
MCV RBC AUTO: 87.2 FL (ref 79–97)
MONOCYTES # BLD AUTO: 0.63 10*3/MM3 (ref 0.1–0.9)
MONOCYTES NFR BLD AUTO: 7.8 % (ref 5–12)
NEUTROPHILS NFR BLD AUTO: 3.97 10*3/MM3 (ref 1.7–7)
NEUTROPHILS NFR BLD AUTO: 49.2 % (ref 42.7–76)
NRBC BLD AUTO-RTO: 0 /100 WBC (ref 0–0.2)
PLATELET # BLD AUTO: 275 10*3/MM3 (ref 140–450)
PMV BLD AUTO: 9.3 FL (ref 6–12)
POTASSIUM SERPL-SCNC: 4.2 MMOL/L (ref 3.5–5.2)
PROT SERPL-MCNC: 7 G/DL (ref 6–8.5)
PSA SERPL-MCNC: 2.62 NG/ML (ref 0–4)
RBC # BLD AUTO: 5.15 10*6/MM3 (ref 4.14–5.8)
SODIUM SERPL-SCNC: 144 MMOL/L (ref 136–145)
TRIGL SERPL-MCNC: 81 MG/DL (ref 0–150)
TSH SERPL DL<=0.05 MIU/L-ACNC: 2.79 UIU/ML (ref 0.27–4.2)
VIT B12 BLD-MCNC: 388 PG/ML (ref 211–946)
VLDLC SERPL-MCNC: 17 MG/DL (ref 5–40)
WBC NRBC COR # BLD AUTO: 8.07 10*3/MM3 (ref 3.4–10.8)

## 2025-06-24 PROCEDURE — 80061 LIPID PANEL: CPT

## 2025-06-24 PROCEDURE — G0103 PSA SCREENING: HCPCS

## 2025-06-24 PROCEDURE — 84443 ASSAY THYROID STIM HORMONE: CPT

## 2025-06-24 PROCEDURE — 80053 COMPREHEN METABOLIC PANEL: CPT

## 2025-06-24 PROCEDURE — 36415 COLL VENOUS BLD VENIPUNCTURE: CPT

## 2025-06-24 PROCEDURE — 82607 VITAMIN B-12: CPT

## 2025-06-24 PROCEDURE — 82306 VITAMIN D 25 HYDROXY: CPT

## 2025-06-24 PROCEDURE — 83036 HEMOGLOBIN GLYCOSYLATED A1C: CPT

## 2025-06-24 PROCEDURE — 85025 COMPLETE CBC W/AUTO DIFF WBC: CPT

## 2025-06-25 ENCOUNTER — OFFICE VISIT (OUTPATIENT)
Dept: CARDIOLOGY | Facility: CLINIC | Age: 65
End: 2025-06-25
Payer: COMMERCIAL

## 2025-06-25 VITALS
HEIGHT: 70 IN | SYSTOLIC BLOOD PRESSURE: 122 MMHG | OXYGEN SATURATION: 96 % | HEART RATE: 69 BPM | BODY MASS INDEX: 33.07 KG/M2 | DIASTOLIC BLOOD PRESSURE: 76 MMHG | WEIGHT: 231 LBS

## 2025-06-25 DIAGNOSIS — R07.2 PRECORDIAL PAIN: Primary | ICD-10-CM

## 2025-06-25 PROCEDURE — 99214 OFFICE O/P EST MOD 30 MIN: CPT | Performed by: INTERNAL MEDICINE

## 2025-06-25 RX ORDER — METOPROLOL TARTRATE 25 MG/1
200 TABLET, FILM COATED ORAL ONCE
OUTPATIENT
Start: 2025-06-25 | End: 2025-06-25

## 2025-06-25 RX ORDER — IVABRADINE 5 MG/1
15 TABLET, FILM COATED ORAL ONCE
OUTPATIENT
Start: 2025-06-25 | End: 2025-06-25

## 2025-06-25 RX ORDER — SODIUM CHLORIDE 0.9 % (FLUSH) 0.9 %
10 SYRINGE (ML) INJECTION AS NEEDED
OUTPATIENT
Start: 2025-06-25

## 2025-06-25 RX ORDER — METOPROLOL TARTRATE 25 MG/1
100 TABLET, FILM COATED ORAL ONCE
OUTPATIENT
Start: 2025-06-25

## 2025-06-25 RX ORDER — METOPROLOL TARTRATE 1 MG/ML
5 INJECTION, SOLUTION INTRAVENOUS
OUTPATIENT
Start: 2025-06-25

## 2025-06-25 RX ORDER — SODIUM CHLORIDE 9 MG/ML
40 INJECTION, SOLUTION INTRAVENOUS AS NEEDED
OUTPATIENT
Start: 2025-06-25

## 2025-06-25 RX ORDER — ISOSORBIDE DINITRATE 10 MG/1
10 TABLET ORAL EVERY MORNING
Qty: 30 TABLET | Refills: 5 | Status: SHIPPED | OUTPATIENT
Start: 2025-06-25

## 2025-06-25 RX ORDER — NITROGLYCERIN 0.4 MG/1
0.8 TABLET SUBLINGUAL
OUTPATIENT
Start: 2025-06-25

## 2025-06-25 RX ORDER — METOPROLOL TARTRATE 25 MG/1
150 TABLET, FILM COATED ORAL ONCE
OUTPATIENT
Start: 2025-06-25

## 2025-06-25 RX ORDER — METOPROLOL TARTRATE 25 MG/1
50 TABLET, FILM COATED ORAL ONCE
OUTPATIENT
Start: 2025-06-25

## 2025-06-25 RX ORDER — SODIUM CHLORIDE 0.9 % (FLUSH) 0.9 %
10 SYRINGE (ML) INJECTION EVERY 12 HOURS SCHEDULED
OUTPATIENT
Start: 2025-06-25

## 2025-06-25 RX ORDER — NITROGLYCERIN 0.4 MG/1
0.4 TABLET SUBLINGUAL
OUTPATIENT
Start: 2025-06-25 | End: 2025-06-25

## 2025-06-25 RX ORDER — METOPROLOL TARTRATE 50 MG
TABLET ORAL
Qty: 2 TABLET | Refills: 0 | Status: SHIPPED | OUTPATIENT
Start: 2025-06-25

## 2025-06-25 RX ORDER — METOPROLOL TARTRATE 50 MG
50 TABLET ORAL
OUTPATIENT
Start: 2025-06-25

## 2025-06-25 RX ORDER — ATORVASTATIN CALCIUM 40 MG/1
40 TABLET, FILM COATED ORAL DAILY
Qty: 90 TABLET | Refills: 3 | Status: SHIPPED | OUTPATIENT
Start: 2025-06-25

## 2025-06-25 NOTE — PROGRESS NOTES
[facility] Cardiology  Office visit  Kennedy Griffith  1960  [phone]  There is no work phone number on file.    VISIT DATE:  6/25/2025    PCP: Carline Schmitt, APRN  [address]  [facility] [id number]    CC:  Chief Complaint   Patient presents with    Mixed hyperlipidemia       Previous cardiac studies and procedures:  February 2024 exercise myocardial perfusion imaging    Exercise cardiolite with normal perfusion.    Left ventricular ejection fraction is hyperdynamic (Calculated EF > 70%).    Patient complained of chest tightness, rated 4/10, during exercise that resolved in recovery. Pt also c/o SOA (SP02 WNL on RA) that also resolved in recovery.    THR of 131 achieved at 8:20.    Expected exercise duration = 8:00. Actual exercise duration = 9:30. BERRY (-18).    SB-ST with PVCs and bigeminy noted during exercise.    T wave abnormalites in Anterior leads. T wave inversion at baseline in lead III that returned near baseline in recovery.    No significant ST-T wave changes noted.    No coronary calcifications.    July 2024 TTE    Left ventricular systolic function is normal. Estimated left ventricular EF = 60%    Left ventricular wall thickness is consistent with mild concentric hypertrophy.    Saline test results are negative.    The cardiac valves are anatomically and functionally normal.    ASSESSMENT:   Diagnosis Plan   1. Precordial pain  CT Angiogram Coronary    CT Angiogram Coronary-Cardiology Interpretation    metoprolol tartrate (LOPRESSOR) tablet 200 mg    metoprolol tartrate (LOPRESSOR) tablet 150 mg    metoprolol tartrate (LOPRESSOR) tablet 100 mg    metoprolol tartrate (LOPRESSOR) tablet 50 mg    metoprolol tartrate (LOPRESSOR) tablet 50 mg    metoprolol tartrate (LOPRESSOR) injection 5 mg    nitroglycerin (NITROSTAT) SL tablet 0.4 mg    nitroglycerin (NITROSTAT) SL tablet 0.8 mg    ivabradine HCl (CORLANOR) tablet 15 mg    No Caffeine or  Nicotine 4 Hours Prior to CTA Appointment    Nothing to Eat or Drink 4 Hours Prior to CTA Appointment    Do Not Take Phosphodiasterase Inhibitors in the 72 Hours Prior to Coronary CTA    Obtain Informed Consent - Computed Tomography Angiography of Chest - Angiogram of Coronary Arteries    Vital Signs Upon Arrival    Cardiac Monitoring    Verify NPO Status - Patient to Be NPO at Least 4 Hours Prior to CTA    Notify CT After Administration of metoprolol tartrate (LOPRESSOR) tablet    Notify Provider If Total Metoprolol Given Equals 300mg & Heart Rate Not At Goal    Notify Provider Prior to Administration of Nitroglycerin if Patient SBP <80    POC Creatinine    Insert Peripheral IV    Saline Lock & Maintain IV Access    sodium chloride 0.9 % flush 10 mL    sodium chloride 0.9 % flush 10 mL    sodium chloride 0.9 % infusion 40 mL    Vital Signs - See Instructions    Hold Medication Metformin (Glucophage, Glucophage XR, Fortament, Glumetza);  Metglip (metformin/glipizide);  Glucovance (metformin/glyburide); Avandamet (metformin/rosiglitazone)    Patient May Discharge Home After Procedure Complete (If Stable)    metoprolol tartrate (LOPRESSOR) 50 MG tablet            PLAN:  History of stable angina, class II:   Continue aspirin and high intensity statin therapy.  Continue low-dose amlodipine for symptom relief.  Trial of Isordil 10 mg p.o. every morning.  Recommending coronary CTA for more definitive evaluation of coronary anatomy.    Hyperlipidemia: Goal LDL less than 70.  History of CVA requiring tenecteplase, no residual deficit noted on MRI.    Continue high intensity statin therapy, although decreasing atorvastatin from 80 mg p.o. daily to 40 mg p.o. daily..  Continue PCSK9 inhibitor.    Subjective  Interval assessment: Persistent daily precordial chest discomfort brought on by such activities as walking in from the parking lot from his work or going up a flight of stairs, consistently relieved with rest.  Blood  "pressures running less than 130/80 mmHg.  He is compliant with medical therapy.    Initial evaluation: 63-year-old prediabetic gentleman with a history of hyperlipidemia and recent CVA treated with TNK presenting with longstanding history of exertional chest discomfort. Has had exertional lower precordial chest discomfort and a class II pattern with such activities as walking fast or going up a flight of stairs over the past 2 to 3 years. Relieved with rest. Has undergone a low risk cardiac evaluation. No other triggers to his symptoms. Currently has persistent positional vertigo following his CNS event in July. Undergoing physical therapy. Compliant with medical therapy. Blood pressures running less than 125/80 mmHg.     PHYSICAL EXAMINATION:  Vitals:    06/25/25 1326   BP: 122/76   BP Location: Left arm   Patient Position: Sitting   Cuff Size: Adult   Pulse: 69   SpO2: 96%   Weight: 105 kg (231 lb)   Height: 177.8 cm (70\")     General Appearance:    Alert, cooperative, no distress, appears stated age   Head:    Normocephalic, without obvious abnormality, atraumatic   Lungs:     Clear to auscultation bilaterally, respirations unlabored   Chest Wall:    No tenderness or deformity    Heart:    Regular rate and rhythm, S1 and S2 normal, no murmur, rub   or gallop, normal carotid impulse bilaterally without bruit.   Abdomen:     Soft, non-tender   Extremities:   Extremities normal, atraumatic, no cyanosis or edema   Pulses:   2+ and symmetric all extremities   Skin:   Skin color, texture, turgor normal, no rashes or lesions       Diagnostic Data:  Procedures  Lab Results   Component Value Date    TRIG 81 06/24/2025    HDL 44 06/24/2025     Lab Results   Component Value Date    GLUCOSE 105 (H) 06/24/2025    BUN 13.0 06/24/2025    CREATININE 1.09 06/24/2025     06/24/2025    K 4.2 06/24/2025     06/24/2025    CO2 26.5 06/24/2025     Lab Results   Component Value Date    HGBA1C 6.10 (H) 06/24/2025     Lab " Results   Component Value Date    WBC 8.07 06/24/2025    HGB 15.0 06/24/2025    HCT 44.9 06/24/2025     06/24/2025       Allergies  No Known Allergies    Current Medications    Current Outpatient Medications:     amLODIPine (NORVASC) 2.5 MG tablet, Take 1 tablet by mouth Every Night., Disp: 90 tablet, Rfl: 3    aspirin 325 MG EC tablet, Take 1 tablet by mouth Every 6 (Six) Hours As Needed for Mild Pain., Disp: 30 tablet, Rfl: 1    buPROPion (WELLBUTRIN) 100 MG tablet, Take 1 tablet by mouth Every Morning., Disp: 90 tablet, Rfl: 1    busPIRone (BUSPAR) 15 MG tablet, Take 1 tablet by mouth 3 (Three) Times a Day., Disp: 270 tablet, Rfl: 1    DULoxetine (CYMBALTA) 60 MG capsule, Take 1 capsule by mouth Daily., Disp: 90 capsule, Rfl: 1    Evolocumab (REPATHA) solution auto-injector SureClick injection, Inject 1 mL under the skin into the appropriate area as directed Every 14 (Fourteen) Days., Disp: 2 mL, Rfl: 11    meclizine (ANTIVERT) 25 MG tablet, Take 1 tablet by mouth 3 (Three) Times a Day As Needed for Dizziness., Disp: 90 tablet, Rfl: 2    propranolol (INDERAL) 20 MG tablet, Take 1 tablet by mouth 2 (Two) Times a Day., Disp: 180 tablet, Rfl: 1    atorvastatin (LIPITOR) 40 MG tablet, Take 1 tablet by mouth Daily., Disp: 90 tablet, Rfl: 3    isosorbide dinitrate (ISORDIL) 10 MG tablet, Take 1 tablet by mouth Every Morning., Disp: 30 tablet, Rfl: 5    metoprolol tartrate (LOPRESSOR) 50 MG tablet, Take 50 mg at Bedtime the Night Before Coronary CTA Appointment and In the Morning 1 Hour Prior to Coronary CTA Appointment. Do not take if heart rate less than 60., Disp: 2 tablet, Rfl: 0          ROS  ROS      SOCIAL HX  Social History     Socioeconomic History    Marital status:    Tobacco Use    Smoking status: Never     Passive exposure: Never    Smokeless tobacco: Never   Vaping Use    Vaping status: Never Used   Substance and Sexual Activity    Alcohol use: Not Currently     Comment: on rare occasion     Drug use: Never    Sexual activity: Defer     Comment:        FAMILY HX  Family History   Problem Relation Age of Onset    Other Mother         cardiac arrhythmia/pacer    Arthritis Father     No Known Problems Sister     No Known Problems Brother     Colon cancer Maternal Grandmother     No Known Problems Maternal Grandfather     Heart disease Paternal Grandmother     Coronary artery disease Paternal Grandfather     Lung cancer Paternal Grandfather     Other Daughter         PTSD/suicide    No Known Problems Son     ADD / ADHD Son              Matthew Trevino III, MD, FACC

## 2025-07-01 ENCOUNTER — TELEPHONE (OUTPATIENT)
Dept: CARDIOLOGY | Facility: CLINIC | Age: 65
End: 2025-07-01
Payer: COMMERCIAL

## 2025-07-01 ENCOUNTER — SPECIALTY PHARMACY (OUTPATIENT)
Dept: CARDIOLOGY | Facility: CLINIC | Age: 65
End: 2025-07-01
Payer: COMMERCIAL

## 2025-07-01 NOTE — PROGRESS NOTES
Specialty Pharmacy Patient Management Program  Cardiology Specialty Pharmacy Refill Outreach      Kennedy is a 64 y.o. male contacted today regarding refills of his medication(s).    Specialty medication(s) and dose(s) confirmed: Repatha  Other medications being refilled: N/A    Refill Questions      Flowsheet Row Most Recent Value   Changes to allergies? No   Changes to medications? No   New conditions or infections since last clinic visit No   Unplanned office visit, urgent care, ED, or hospital admission in the last 4 weeks  No   How does patient/caregiver feel medication is working? Very good   Financial problems or insurance changes  No   Since the previous refill, were any specialty medication doses or scheduled injections missed or delayed?  No   Does this patient require a clinical escalation to a pharmacist? No            Delivery Questions      Flowsheet Row Most Recent Value   Delivery method UPS   Delivery address verified with patient/caregiver? Yes   Delivery address Home   Number of medications in delivery 1   Medication(s) being filled and delivered Evolocumab (REPATHA)   Copay verified? Yes   Copay amount $15.00   Copay form of payment Credit/debit on file   Delivery Date Selection 07/02/25   Signature Required No   Do you consent to receive electronic handouts?  No                   Follow-up: 21 days     Tati Knight CPhT  Pharmacy Care Coordinator  7/1/2025  11:29 EDT

## 2025-07-10 RX ORDER — AMLODIPINE BESYLATE 2.5 MG/1
2.5 TABLET ORAL
Qty: 90 TABLET | Refills: 1 | Status: SHIPPED | OUTPATIENT
Start: 2025-07-10

## 2025-07-24 ENCOUNTER — SPECIALTY PHARMACY (OUTPATIENT)
Facility: HOSPITAL | Age: 65
End: 2025-07-24
Payer: COMMERCIAL

## 2025-07-24 NOTE — PROGRESS NOTES
Specialty Pharmacy Patient Management Program  Cardiology Specialty Pharmacy Refill Outreach      Kennedy is a 64 y.o. male contacted today regarding refills of his medication(s).    Specialty medication(s) and dose(s) confirmed: Repatha  Other medications being refilled: N/A    Refill Questions      Flowsheet Row Most Recent Value   Changes to allergies? No   Changes to medications? No   New conditions or infections since last clinic visit No   Unplanned office visit, urgent care, ED, or hospital admission in the last 4 weeks  No   How does patient/caregiver feel medication is working? Good   Financial problems or insurance changes  No   Since the previous refill, were any specialty medication doses or scheduled injections missed or delayed?  No   Does this patient require a clinical escalation to a pharmacist? No            Delivery Questions      Flowsheet Row Most Recent Value   Delivery method UPS   Delivery address verified with patient/caregiver? Yes   Delivery address Home   Number of medications in delivery 1   Medication(s) being filled and delivered Evolocumab (REPATHA)   Copay verified? Yes   Copay amount $15.00   Copay form of payment Credit/debit on file   Delivery Date Selection 07/29/25   Signature Required No   Do you consent to receive electronic handouts?  No                   Follow-up: 21 days     Tati Knight CPhT  Pharmacy Care Coordinator  7/24/2025  14:38 EDT

## 2025-07-25 NOTE — PROGRESS NOTES
Specialty Pharmacy Patient Management Program  Cardiology Reassessment     Kennedy Griffith was referred by a Cardiology provider to the Cardiology Patient Management program offered by [facility] for Hyperlipidemia. A follow-up outreach was conducted, including assessment of continued therapy appropriateness, medication adherence, and side effect incidence and management for Repatha.    Changes to Insurance Coverage or Financial Support  No changes    Relevant Past Medical History and Comorbidities  Relevant medical history and concomitant health conditions were discussed with the patient. The patient's chart has been reviewed for relevant past medical history and comorbid health conditions and updated as necessary.   Past Medical History:   Diagnosis Date    Anxiety     Depression     Fatigue     chronic    History of kidney stones     Hyperlipidemia     Shortness of breath     Sleepiness     Stroke     Tension headache      Social History     Socioeconomic History    Marital status:    Tobacco Use    Smoking status: Never     Passive exposure: Never    Smokeless tobacco: Never   Vaping Use    Vaping status: Never Used   Substance and Sexual Activity    Alcohol use: Not Currently     Comment: on rare occasion    Drug use: Never    Sexual activity: Defer     Comment:      Problem list reviewed by Marcy Bailey, Dayan on 7/25/2025 at  9:53 AM    Hospitalizations and Urgent Care Since Last Assessment  ED Visits, Admissions, or Hospitalizations: None  Urgent Office Visits: None    Allergies  Known allergies and reactions were discussed with the patient. The patient's chart has been reviewed for allergy information and updated as necessary.   No Known Allergies  Allergies reviewed by Marcy Bailey, Dayan on 7/25/2025 at  9:53 AM    Relevant Laboratory Values  Relevant laboratory values were discussed with the patient. The following specialty medication dose  adjustment(s) are recommended: No changes    Lab Results   Component Value Date    GLUCOSE 105 (H) 06/24/2025    CALCIUM 9.3 06/24/2025     06/24/2025    K 4.2 06/24/2025    CO2 26.5 06/24/2025     06/24/2025    BUN 13.0 06/24/2025    CREATININE 1.09 06/24/2025    EGFRIFAFRI 86 01/14/2022    EGFRIFNONA 71 01/14/2022    BCR 11.9 06/24/2025    ANIONGAP 12.5 06/24/2025     Lab Results   Component Value Date    CHOL 68 06/24/2025    TRIG 81 06/24/2025    HDL 44 06/24/2025    LDL 7 06/24/2025       Current Medication List  This medication list has been reviewed with the patient and evaluated for any interactions or necessary modifications/recommendations, and updated to include all prescription medications, OTC medications, and supplements the patient is currently taking.  This list reflects what is contained in the patient's profile, which has also been marked as reviewed to communicate to other providers it is the most up to date version of the patient's current medication therapy.     Current Outpatient Medications:     amLODIPine (NORVASC) 2.5 MG tablet, TAKE 1 TABLET BY MOUTH EVERY DAY AT NIGHT, Disp: 90 tablet, Rfl: 1    aspirin 325 MG EC tablet, Take 1 tablet by mouth Every 6 (Six) Hours As Needed for Mild Pain., Disp: 30 tablet, Rfl: 1    atorvastatin (LIPITOR) 40 MG tablet, Take 1 tablet by mouth Daily., Disp: 90 tablet, Rfl: 3    buPROPion (WELLBUTRIN) 100 MG tablet, Take 1 tablet by mouth Every Morning., Disp: 90 tablet, Rfl: 1    busPIRone (BUSPAR) 15 MG tablet, Take 1 tablet by mouth 3 (Three) Times a Day., Disp: 270 tablet, Rfl: 1    DULoxetine (CYMBALTA) 60 MG capsule, Take 1 capsule by mouth Daily., Disp: 90 capsule, Rfl: 1    Evolocumab (REPATHA) solution auto-injector SureClick injection, Inject 1 mL under the skin into the appropriate area as directed Every 14 (Fourteen) Days., Disp: 2 mL, Rfl: 11    isosorbide dinitrate (ISORDIL) 10 MG tablet, Take 1 tablet by mouth Every Morning., Disp:  30 tablet, Rfl: 5    meclizine (ANTIVERT) 25 MG tablet, Take 1 tablet by mouth 3 (Three) Times a Day As Needed for Dizziness., Disp: 90 tablet, Rfl: 2    metoprolol tartrate (LOPRESSOR) 50 MG tablet, Take 50 mg at Bedtime the Night Before Coronary CTA Appointment and In the Morning 1 Hour Prior to Coronary CTA Appointment. Do not take if heart rate less than 60., Disp: 2 tablet, Rfl: 0    propranolol (INDERAL) 20 MG tablet, Take 1 tablet by mouth 2 (Two) Times a Day., Disp: 180 tablet, Rfl: 1    Medicines reviewed by Marcy Bailey, PharmD on 7/25/2025 at  9:53 AM    Drug Interactions  None    Adverse Drug Reactions  Medication tolerability: Tolerating with no to minimal ADRs  Medication plan: Continue therapy with normal follow-up  Plan for ADR Management: N/A    Adherence, Self-Administration, and Current Therapy Problems  Adherence related to the patient's specialty therapy was discussed with the patient. The Adherence segment of this outreach has been reviewed and updated.     Adherence Questions  Linked Medication(s) Assessed: Evolocumab (REPATHA)  On average, how many doses/injections does the patient miss per month?: 0  What are the identified reasons for non-adherence or missed doses? : no problems identified  What is the estimated medication adherence level?: 80-89%  Based on the patient/caregiver response and refill history, does this patient require an MTP to track adherence improvements?: no    Additional Barriers to Patient Self-Administration: N/A  Methods for Supporting Patient Self-Administration: N/A    Open Medication Therapy Problems  No medication therapy recommendations to display    Goals of Therapy  Goals related to the patient's specialty therapy were discussed with the patient. The Patient Goals segment of this outreach has been reviewed and updated.   Goals Addressed Today        Specialty Pharmacy General Goal      LDL Goal < 70 mg/dL    Lab Results   Component Value Date    LDL 7  06/24/2025     12/18/2024     (H) 07/25/2024     (H) 03/06/2024     (H) 01/14/2022 7/25/25: LDL decreased significantly and is now at goal; continue Repatha.              Quality of Life Assessment   Quality of Life related to the patient's enrollment in the patient management program and services provided was discussed with the patient. The QOL segment of this outreach has been reviewed and updated.  Quality of Life Improvement Scale: 9-A good deal better    Reassessment Plan & Follow-Up  1. Medication Therapy Changes: Continue Repatha 140mg subcutaneous every 14 days   2. Related Plans, Therapy Recommendations, or Issues to Be Addressed: None  3. Pharmacist to perform regular assessments no more than (6) months from the previous assessment.  4. Care Coordinator to set up future refill outreaches, coordinate prescription delivery, and escalate clinical questions to pharmacist.    Attestation  Therapeutic appropriateness: Appropriate   I attest the patient was actively involved in and has agreed to the above plan of care.  If the prescribed therapy is at any point deemed not appropriate based on the current or future assessments, a consultation will be initiated with the patient's specialty care provider to determine the best course of action. The revised plan of therapy will be documented along with any required assessments and/or additional patient education provided.     Marcy Bailey, PharmD, [facility]  Clinical Specialty Pharmacist, Cardiology  7/25/2025  09:54 EDT   No

## 2025-07-30 DIAGNOSIS — R51.9 FREQUENT HEADACHES: ICD-10-CM

## 2025-07-30 DIAGNOSIS — F43.10 PTSD (POST-TRAUMATIC STRESS DISORDER): ICD-10-CM

## 2025-07-30 DIAGNOSIS — F33.1 MODERATE EPISODE OF RECURRENT MAJOR DEPRESSIVE DISORDER: ICD-10-CM

## 2025-07-30 DIAGNOSIS — I10 PRIMARY HYPERTENSION: ICD-10-CM

## 2025-07-30 DIAGNOSIS — G43.909 MIGRAINE WITHOUT STATUS MIGRAINOSUS, NOT INTRACTABLE, UNSPECIFIED MIGRAINE TYPE: ICD-10-CM

## 2025-07-30 DIAGNOSIS — F41.9 ANXIETY: ICD-10-CM

## 2025-07-30 DIAGNOSIS — E55.9 VITAMIN D DEFICIENCY: ICD-10-CM

## 2025-07-31 RX ORDER — TOPIRAMATE 50 MG/1
50 TABLET, FILM COATED ORAL 2 TIMES DAILY
Qty: 180 TABLET | Refills: 1 | OUTPATIENT
Start: 2025-07-31

## 2025-07-31 RX ORDER — DULOXETIN HYDROCHLORIDE 60 MG/1
60 CAPSULE, DELAYED RELEASE ORAL DAILY
Qty: 30 CAPSULE | Refills: 0 | Status: SHIPPED | OUTPATIENT
Start: 2025-07-31

## 2025-07-31 RX ORDER — PROPRANOLOL HCL 20 MG
20 TABLET ORAL 2 TIMES DAILY
Qty: 30 TABLET | Refills: 0 | Status: SHIPPED | OUTPATIENT
Start: 2025-07-31

## 2025-07-31 NOTE — TELEPHONE ENCOUNTER
Rx Refill Note  Requested Prescriptions     Pending Prescriptions Disp Refills    DULoxetine (CYMBALTA) 60 MG capsule [Pharmacy Med Name: DULOXETINE HCL DR 60 MG CAP] 30 capsule 0     Sig: TAKE 1 CAPSULE BY MOUTH EVERY DAY    propranolol (INDERAL) 20 MG tablet [Pharmacy Med Name: PROPRANOLOL 20 MG TABLET] 30 tablet 0     Sig: TAKE 1 TABLET BY MOUTH TWICE A DAY     Refused Prescriptions Disp Refills    topiramate (TOPAMAX) 50 MG tablet [Pharmacy Med Name: TOPIRAMATE 50 MG TABLET] 180 tablet 1     Sig: TAKE 1 TABLET BY MOUTH TWICE A DAY     Refused By: ABBIE GABRIEL     Reason for Refusal: Refill not appropriate      Last office visit with prescribing clinician: 5/5/2025   Last telemedicine visit with prescribing clinician: Visit date not found   Next office visit with prescribing clinician: 8/5/2025       Abbie Gabriel  07/31/25, 08:34 EDT

## 2025-08-07 ENCOUNTER — TELEPHONE (OUTPATIENT)
Dept: INFUSION THERAPY | Facility: HOSPITAL | Age: 65
End: 2025-08-07
Payer: COMMERCIAL

## 2025-08-08 ENCOUNTER — HOSPITAL ENCOUNTER (OUTPATIENT)
Dept: CT IMAGING | Facility: HOSPITAL | Age: 65
Discharge: HOME | End: 2025-08-08
Payer: COMMERCIAL

## 2025-08-08 VITALS
HEART RATE: 60 BPM | BODY MASS INDEX: 34.16 KG/M2 | DIASTOLIC BLOOD PRESSURE: 61 MMHG | HEIGHT: 70 IN | TEMPERATURE: 97.3 F | WEIGHT: 238.6 LBS | RESPIRATION RATE: 18 BRPM | OXYGEN SATURATION: 95 % | SYSTOLIC BLOOD PRESSURE: 101 MMHG

## 2025-08-08 DIAGNOSIS — R07.2 PRECORDIAL PAIN: ICD-10-CM

## 2025-08-08 PROCEDURE — 75574 CT ANGIO HRT W/3D IMAGE: CPT

## 2025-08-08 PROCEDURE — 25510000001 IOPAMIDOL PER 1 ML: Performed by: INTERNAL MEDICINE

## 2025-08-08 RX ORDER — NITROGLYCERIN 0.4 MG/1
0.8 TABLET SUBLINGUAL
Status: COMPLETED | OUTPATIENT
Start: 2025-08-08 | End: 2025-08-08

## 2025-08-08 RX ORDER — METOPROLOL TARTRATE 1 MG/ML
5 INJECTION, SOLUTION INTRAVENOUS
Status: DISCONTINUED | OUTPATIENT
Start: 2025-08-08 | End: 2025-08-09 | Stop reason: HOSPADM

## 2025-08-08 RX ORDER — SODIUM CHLORIDE 9 MG/ML
40 INJECTION, SOLUTION INTRAVENOUS AS NEEDED
Status: DISCONTINUED | OUTPATIENT
Start: 2025-08-08 | End: 2025-08-09 | Stop reason: HOSPADM

## 2025-08-08 RX ORDER — METOPROLOL TARTRATE 100 MG/1
200 TABLET ORAL ONCE
Status: DISCONTINUED | OUTPATIENT
Start: 2025-08-08 | End: 2025-08-09 | Stop reason: HOSPADM

## 2025-08-08 RX ORDER — NITROGLYCERIN 0.4 MG/1
0.4 TABLET SUBLINGUAL
Status: COMPLETED | OUTPATIENT
Start: 2025-08-08 | End: 2025-08-08

## 2025-08-08 RX ORDER — IVABRADINE 5 MG/1
15 TABLET, FILM COATED ORAL ONCE
Status: DISCONTINUED | OUTPATIENT
Start: 2025-08-08 | End: 2025-08-09 | Stop reason: HOSPADM

## 2025-08-08 RX ORDER — METOPROLOL TARTRATE 50 MG
50 TABLET ORAL ONCE
Status: DISCONTINUED | OUTPATIENT
Start: 2025-08-08 | End: 2025-08-09 | Stop reason: HOSPADM

## 2025-08-08 RX ORDER — SODIUM CHLORIDE 0.9 % (FLUSH) 0.9 %
10 SYRINGE (ML) INJECTION EVERY 12 HOURS SCHEDULED
Status: DISCONTINUED | OUTPATIENT
Start: 2025-08-08 | End: 2025-08-09 | Stop reason: HOSPADM

## 2025-08-08 RX ORDER — METOPROLOL TARTRATE 50 MG
50 TABLET ORAL
Status: DISCONTINUED | OUTPATIENT
Start: 2025-08-08 | End: 2025-08-09 | Stop reason: HOSPADM

## 2025-08-08 RX ORDER — METOPROLOL TARTRATE 100 MG/1
100 TABLET ORAL ONCE
Status: DISCONTINUED | OUTPATIENT
Start: 2025-08-08 | End: 2025-08-09 | Stop reason: HOSPADM

## 2025-08-08 RX ORDER — IOPAMIDOL 755 MG/ML
100 INJECTION, SOLUTION INTRAVASCULAR
Status: COMPLETED | OUTPATIENT
Start: 2025-08-08 | End: 2025-08-08

## 2025-08-08 RX ORDER — SODIUM CHLORIDE 0.9 % (FLUSH) 0.9 %
10 SYRINGE (ML) INJECTION AS NEEDED
Status: DISCONTINUED | OUTPATIENT
Start: 2025-08-08 | End: 2025-08-09 | Stop reason: HOSPADM

## 2025-08-08 RX ADMIN — NITROGLYCERIN 0.8 MG: 0.4 TABLET SUBLINGUAL at 12:11

## 2025-08-08 RX ADMIN — IOPAMIDOL 65 ML: 755 INJECTION, SOLUTION INTRAVENOUS at 12:22

## 2025-08-11 ENCOUNTER — HOSPITAL ENCOUNTER (OUTPATIENT)
Dept: CT IMAGING | Facility: HOSPITAL | Age: 65
Discharge: HOME | End: 2025-08-11
Admitting: INTERNAL MEDICINE
Payer: COMMERCIAL

## 2025-08-11 DIAGNOSIS — R93.1 ABNORMAL FINDINGS DIAGNOSTIC IMAGING OF HEART AND CORONARY CIRCULATION: Primary | ICD-10-CM

## 2025-08-11 DIAGNOSIS — R93.1 ABNORMAL FINDINGS DIAGNOSTIC IMAGING OF HEART AND CORONARY CIRCULATION: ICD-10-CM

## 2025-08-11 PROCEDURE — 75580 N-INVAS EST C FFR SW ALY CTA: CPT

## 2025-08-11 PROCEDURE — 75580 N-INVAS EST C FFR SW ALY CTA: CPT | Performed by: INTERNAL MEDICINE

## 2025-08-13 DIAGNOSIS — R51.9 FREQUENT HEADACHES: ICD-10-CM

## 2025-08-13 DIAGNOSIS — G43.909 MIGRAINE WITHOUT STATUS MIGRAINOSUS, NOT INTRACTABLE, UNSPECIFIED MIGRAINE TYPE: ICD-10-CM

## 2025-08-13 DIAGNOSIS — I10 PRIMARY HYPERTENSION: ICD-10-CM

## 2025-08-15 RX ORDER — PROPRANOLOL HCL 20 MG
20 TABLET ORAL 2 TIMES DAILY
Qty: 180 TABLET | Refills: 1 | Status: SHIPPED | OUTPATIENT
Start: 2025-08-15

## 2025-08-25 ENCOUNTER — OFFICE VISIT (OUTPATIENT)
Dept: INTERNAL MEDICINE | Facility: CLINIC | Age: 65
End: 2025-08-25
Payer: COMMERCIAL

## 2025-08-25 VITALS
DIASTOLIC BLOOD PRESSURE: 78 MMHG | HEIGHT: 70 IN | TEMPERATURE: 97.8 F | SYSTOLIC BLOOD PRESSURE: 118 MMHG | WEIGHT: 234.4 LBS | OXYGEN SATURATION: 98 % | BODY MASS INDEX: 33.56 KG/M2 | HEART RATE: 66 BPM

## 2025-08-25 DIAGNOSIS — F41.9 ANXIETY: ICD-10-CM

## 2025-08-25 DIAGNOSIS — Z86.73 HISTORY OF STROKE: ICD-10-CM

## 2025-08-25 DIAGNOSIS — E78.2 MIXED HYPERLIPIDEMIA: Primary | ICD-10-CM

## 2025-08-25 DIAGNOSIS — F43.10 PTSD (POST-TRAUMATIC STRESS DISORDER): ICD-10-CM

## 2025-08-25 DIAGNOSIS — Z23 NEED FOR SHINGLES VACCINE: ICD-10-CM

## 2025-08-25 PROCEDURE — 99214 OFFICE O/P EST MOD 30 MIN: CPT | Performed by: NURSE PRACTITIONER

## 2025-08-25 PROCEDURE — 90750 HZV VACC RECOMBINANT IM: CPT | Performed by: NURSE PRACTITIONER

## 2025-08-27 DIAGNOSIS — F41.9 ANXIETY: ICD-10-CM

## 2025-08-27 DIAGNOSIS — F43.10 PTSD (POST-TRAUMATIC STRESS DISORDER): ICD-10-CM

## 2025-08-27 DIAGNOSIS — F33.1 MODERATE EPISODE OF RECURRENT MAJOR DEPRESSIVE DISORDER: ICD-10-CM

## 2025-08-27 DIAGNOSIS — E55.9 VITAMIN D DEFICIENCY: ICD-10-CM

## 2025-08-27 RX ORDER — DULOXETIN HYDROCHLORIDE 60 MG/1
60 CAPSULE, DELAYED RELEASE ORAL DAILY
Qty: 30 CAPSULE | Refills: 0 | Status: SHIPPED | OUTPATIENT
Start: 2025-08-27

## 2025-08-29 ENCOUNTER — SPECIALTY PHARMACY (OUTPATIENT)
Dept: CARDIOLOGY | Facility: CLINIC | Age: 65
End: 2025-08-29
Payer: COMMERCIAL

## (undated) DEVICE — SHEATH ACC URETRL UROPASS 10/12F 38CM EA/5

## (undated) DEVICE — GLV SURG BIOGEL LTX PF 7

## (undated) DEVICE — DILATOR/SHEATH SET: Brand: 8/10 DILATOR/SHEATH SET

## (undated) DEVICE — NITINOL WIRE WITH HYDROPHILIC TIP: Brand: SENSOR

## (undated) DEVICE — PK CYSTO-TUR BASIC 10

## (undated) DEVICE — DUAL LUMEN URETERAL CATHETER

## (undated) DEVICE — GW AMPLTZ SUPERSTIFF STR .035IN 180CM

## (undated) DEVICE — FIBR LASR SOLTIVE BALL/TP 200MICRON 1P/U EA/5

## (undated) DEVICE — SYR LUERLOK 30CC